# Patient Record
Sex: FEMALE | Race: WHITE | NOT HISPANIC OR LATINO | Employment: OTHER | URBAN - NONMETROPOLITAN AREA
[De-identification: names, ages, dates, MRNs, and addresses within clinical notes are randomized per-mention and may not be internally consistent; named-entity substitution may affect disease eponyms.]

---

## 2018-01-04 ENCOUNTER — OFFICE VISIT CONVERTED (OUTPATIENT)
Dept: FAMILY MEDICINE CLINIC | Age: 63
End: 2018-01-04
Attending: NURSE PRACTITIONER

## 2018-01-19 ENCOUNTER — OFFICE VISIT CONVERTED (OUTPATIENT)
Dept: FAMILY MEDICINE CLINIC | Age: 63
End: 2018-01-19
Attending: NURSE PRACTITIONER

## 2018-02-14 ENCOUNTER — OFFICE VISIT CONVERTED (OUTPATIENT)
Dept: FAMILY MEDICINE CLINIC | Age: 63
End: 2018-02-14
Attending: FAMILY MEDICINE

## 2018-03-23 ENCOUNTER — OFFICE VISIT CONVERTED (OUTPATIENT)
Dept: FAMILY MEDICINE CLINIC | Age: 63
End: 2018-03-23
Attending: NURSE PRACTITIONER

## 2018-06-04 ENCOUNTER — OFFICE VISIT CONVERTED (OUTPATIENT)
Dept: FAMILY MEDICINE CLINIC | Age: 63
End: 2018-06-04
Attending: NURSE PRACTITIONER

## 2018-06-07 ENCOUNTER — OFFICE VISIT (OUTPATIENT)
Dept: ORTHOPEDIC SURGERY | Facility: CLINIC | Age: 63
End: 2018-06-07

## 2018-06-07 VITALS — WEIGHT: 95 LBS | HEIGHT: 62 IN | BODY MASS INDEX: 17.48 KG/M2

## 2018-06-07 DIAGNOSIS — M75.101 ROTATOR CUFF TEAR ARTHROPATHY OF RIGHT SHOULDER: Primary | ICD-10-CM

## 2018-06-07 DIAGNOSIS — Z96.652 STATUS POST TOTAL KNEE REPLACEMENT, LEFT: ICD-10-CM

## 2018-06-07 DIAGNOSIS — M12.811 ROTATOR CUFF TEAR ARTHROPATHY OF RIGHT SHOULDER: Primary | ICD-10-CM

## 2018-06-07 PROCEDURE — 99204 OFFICE O/P NEW MOD 45 MIN: CPT | Performed by: ORTHOPAEDIC SURGERY

## 2018-06-07 RX ORDER — HYDROCODONE BITARTRATE AND ACETAMINOPHEN 7.5; 325 MG/1; MG/1
1 TABLET ORAL EVERY 6 HOURS PRN
COMMUNITY
End: 2018-06-07

## 2018-06-07 RX ORDER — HYDROCODONE BITARTRATE AND ACETAMINOPHEN 5; 325 MG/1; MG/1
1 TABLET ORAL NIGHTLY PRN
Qty: 30 TABLET | Refills: 0 | Status: SHIPPED | OUTPATIENT
Start: 2018-06-07 | End: 2018-06-29

## 2018-06-07 RX ORDER — LEVOTHYROXINE SODIUM 0.07 MG/1
75 TABLET ORAL DAILY
COMMUNITY
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
## 2018-06-14 ENCOUNTER — TELEPHONE (OUTPATIENT)
Dept: ORTHOPEDIC SURGERY | Facility: CLINIC | Age: 63
End: 2018-06-14

## 2018-06-14 NOTE — TELEPHONE ENCOUNTER
DR. GONZÁLES WILL BE PUTTING IN ORDERS FOR MS. DURAN TO HAVE SURGERY. SHE CALLED IN STATING THAT SHE WILL BE OUT OF TOWN FROM 6/13/18-6/25/18 BUT SHE WANTS TO PROCEED WITH THE SURGERY ASAP AFTER 6/25/18 AT [facility]. THANKS!!!

## 2018-06-17 PROBLEM — M75.101 ROTATOR CUFF TEAR ARTHROPATHY OF RIGHT SHOULDER: Status: ACTIVE | Noted: 2018-06-17

## 2018-06-17 PROBLEM — M12.811 ROTATOR CUFF TEAR ARTHROPATHY OF RIGHT SHOULDER: Status: ACTIVE | Noted: 2018-06-17

## 2018-06-17 PROBLEM — Z96.652 STATUS POST TOTAL KNEE REPLACEMENT, LEFT: Status: ACTIVE | Noted: 2018-06-17

## 2018-06-17 RX ORDER — MELOXICAM 7.5 MG/1
15 TABLET ORAL ONCE
Status: CANCELLED | OUTPATIENT
Start: 2018-07-10 | End: 2018-07-10

## 2018-06-17 RX ORDER — CEFAZOLIN SODIUM 2 G/100ML
2 INJECTION, SOLUTION INTRAVENOUS ONCE
Status: CANCELLED | OUTPATIENT
Start: 2018-07-10 | End: 2018-07-10

## 2018-06-17 RX ORDER — PREGABALIN 150 MG/1
150 CAPSULE ORAL ONCE
Status: CANCELLED | OUTPATIENT
Start: 2018-07-10 | End: 2018-07-10

## 2018-06-17 RX ORDER — ACETAMINOPHEN 325 MG/1
1000 TABLET ORAL ONCE
Status: CANCELLED | OUTPATIENT
Start: 2018-07-10 | End: 2018-07-10

## 2018-06-28 NOTE — TELEPHONE ENCOUNTER
PATIENT LEFT VOICEMAIL ON 6/27/18 @ 4:33 PM REQUESTING A REFILL ON HER PAIN MEDICATION (HYDROCODONE 5/325).  PATIENT IS SCHEDULED FOR RIGHT REVERSE TOTAL SHOULDER REPLACEMENT ON 7/10/18./ENMA

## 2018-06-29 ENCOUNTER — APPOINTMENT (OUTPATIENT)
Dept: PREADMISSION TESTING | Facility: HOSPITAL | Age: 63
End: 2018-06-29

## 2018-06-29 VITALS
HEIGHT: 62 IN | HEART RATE: 84 BPM | TEMPERATURE: 97.9 F | BODY MASS INDEX: 17.3 KG/M2 | SYSTOLIC BLOOD PRESSURE: 144 MMHG | RESPIRATION RATE: 16 BRPM | DIASTOLIC BLOOD PRESSURE: 72 MMHG | WEIGHT: 94 LBS | OXYGEN SATURATION: 100 %

## 2018-06-29 DIAGNOSIS — M12.811 ROTATOR CUFF TEAR ARTHROPATHY OF RIGHT SHOULDER: ICD-10-CM

## 2018-06-29 DIAGNOSIS — M75.101 ROTATOR CUFF TEAR ARTHROPATHY OF RIGHT SHOULDER: ICD-10-CM

## 2018-06-29 LAB
ANION GAP SERPL CALCULATED.3IONS-SCNC: 10.8 MMOL/L
BILIRUB UR QL STRIP: NEGATIVE
BUN BLD-MCNC: 20 MG/DL (ref 8–23)
BUN/CREAT SERPL: 32.3 (ref 7–25)
CALCIUM SPEC-SCNC: 9.3 MG/DL (ref 8.6–10.5)
CHLORIDE SERPL-SCNC: 102 MMOL/L (ref 98–107)
CLARITY UR: CLEAR
CO2 SERPL-SCNC: 28.2 MMOL/L (ref 22–29)
COLOR UR: ABNORMAL
CREAT BLD-MCNC: 0.62 MG/DL (ref 0.57–1)
DEPRECATED RDW RBC AUTO: 49.4 FL (ref 37–54)
ERYTHROCYTE [DISTWIDTH] IN BLOOD BY AUTOMATED COUNT: 14 % (ref 11.7–13)
GFR SERPL CREATININE-BSD FRML MDRD: 97 ML/MIN/1.73
GLUCOSE BLD-MCNC: 83 MG/DL (ref 65–99)
GLUCOSE UR STRIP-MCNC: NEGATIVE MG/DL
HCT VFR BLD AUTO: 38.2 % (ref 35.6–45.5)
HGB BLD-MCNC: 12.6 G/DL (ref 11.9–15.5)
HGB UR QL STRIP.AUTO: NEGATIVE
KETONES UR QL STRIP: ABNORMAL
LEUKOCYTE ESTERASE UR QL STRIP.AUTO: NEGATIVE
MCH RBC QN AUTO: 31.7 PG (ref 26.9–32)
MCHC RBC AUTO-ENTMCNC: 33 G/DL (ref 32.4–36.3)
MCV RBC AUTO: 96 FL (ref 80.5–98.2)
NITRITE UR QL STRIP: NEGATIVE
PH UR STRIP.AUTO: 6 [PH] (ref 5–8)
PLATELET # BLD AUTO: 210 10*3/MM3 (ref 140–500)
PMV BLD AUTO: 10.3 FL (ref 6–12)
POTASSIUM BLD-SCNC: 3.7 MMOL/L (ref 3.5–5.2)
PROT UR QL STRIP: NEGATIVE
RBC # BLD AUTO: 3.98 10*6/MM3 (ref 3.9–5.2)
SODIUM BLD-SCNC: 141 MMOL/L (ref 136–145)
SP GR UR STRIP: 1.02 (ref 1–1.03)
UROBILINOGEN UR QL STRIP: ABNORMAL
WBC NRBC COR # BLD: 6.59 10*3/MM3 (ref 4.5–10.7)

## 2018-06-29 PROCEDURE — 93010 ELECTROCARDIOGRAM REPORT: CPT | Performed by: INTERNAL MEDICINE

## 2018-06-29 PROCEDURE — 85027 COMPLETE CBC AUTOMATED: CPT | Performed by: ORTHOPAEDIC SURGERY

## 2018-06-29 PROCEDURE — 36415 COLL VENOUS BLD VENIPUNCTURE: CPT

## 2018-06-29 PROCEDURE — 80048 BASIC METABOLIC PNL TOTAL CA: CPT | Performed by: ORTHOPAEDIC SURGERY

## 2018-06-29 PROCEDURE — 81003 URINALYSIS AUTO W/O SCOPE: CPT | Performed by: ORTHOPAEDIC SURGERY

## 2018-06-29 PROCEDURE — 93005 ELECTROCARDIOGRAM TRACING: CPT

## 2018-06-29 RX ORDER — DIAZEPAM 5 MG/1
5 TABLET ORAL NIGHTLY PRN
Status: ON HOLD | COMMUNITY
End: 2020-06-23 | Stop reason: SDUPTHER

## 2018-06-29 RX ORDER — CHLORHEXIDINE GLUCONATE 500 MG/1
CLOTH TOPICAL
Status: ON HOLD | COMMUNITY
End: 2018-07-10

## 2018-06-29 RX ORDER — HYDROCODONE BITARTRATE AND ACETAMINOPHEN 5; 325 MG/1; MG/1
1 TABLET ORAL EVERY 12 HOURS PRN
Qty: 30 TABLET | Refills: 0 | Status: SHIPPED | OUTPATIENT
Start: 2018-06-29 | End: 2018-07-11 | Stop reason: HOSPADM

## 2018-06-29 NOTE — TELEPHONE ENCOUNTER
Please get a prescription ready for this patient.  Norco 5/325 mg tab 1 by mouth every 12 when necessary pain.  Total 30 pills no refills.  I can sign it for her Friday morning and she can come pick it up.  Thank you

## 2018-07-02 ENCOUNTER — PREP FOR SURGERY (OUTPATIENT)
Dept: OTHER | Facility: HOSPITAL | Age: 63
End: 2018-07-02

## 2018-07-02 DIAGNOSIS — M12.811 ROTATOR CUFF ARTHROPATHY, RIGHT: Primary | ICD-10-CM

## 2018-07-10 ENCOUNTER — ANESTHESIA (OUTPATIENT)
Dept: PERIOP | Facility: HOSPITAL | Age: 63
End: 2018-07-10

## 2018-07-10 ENCOUNTER — ANESTHESIA EVENT (OUTPATIENT)
Dept: PERIOP | Facility: HOSPITAL | Age: 63
End: 2018-07-10

## 2018-07-10 ENCOUNTER — HOSPITAL ENCOUNTER (INPATIENT)
Facility: HOSPITAL | Age: 63
LOS: 2 days | Discharge: HOME | End: 2018-07-12
Attending: ORTHOPAEDIC SURGERY | Admitting: ORTHOPAEDIC SURGERY

## 2018-07-10 ENCOUNTER — APPOINTMENT (OUTPATIENT)
Dept: GENERAL RADIOLOGY | Facility: HOSPITAL | Age: 63
End: 2018-07-10

## 2018-07-10 DIAGNOSIS — M12.811 ROTATOR CUFF ARTHROPATHY, RIGHT: ICD-10-CM

## 2018-07-10 DIAGNOSIS — M75.101 ROTATOR CUFF TEAR ARTHROPATHY OF RIGHT SHOULDER: ICD-10-CM

## 2018-07-10 DIAGNOSIS — M12.811 ROTATOR CUFF TEAR ARTHROPATHY OF RIGHT SHOULDER: ICD-10-CM

## 2018-07-10 DIAGNOSIS — D62 ACUTE BLOOD LOSS AS CAUSE OF POSTOPERATIVE ANEMIA: Primary | ICD-10-CM

## 2018-07-10 PROBLEM — R11.2 PONV (POSTOPERATIVE NAUSEA AND VOMITING): Status: ACTIVE | Noted: 2018-07-10

## 2018-07-10 PROBLEM — E03.9 HYPOTHYROIDISM: Chronic | Status: ACTIVE | Noted: 2018-07-10

## 2018-07-10 PROBLEM — F41.9 ANXIETY: Chronic | Status: ACTIVE | Noted: 2018-07-10

## 2018-07-10 PROBLEM — R11.2 NAUSEA & VOMITING: Status: ACTIVE | Noted: 2018-07-10

## 2018-07-10 PROBLEM — Z98.890 PONV (POSTOPERATIVE NAUSEA AND VOMITING): Status: ACTIVE | Noted: 2018-07-10

## 2018-07-10 LAB
HCT VFR BLD AUTO: 34.5 % (ref 35.6–45.5)
HGB BLD-MCNC: 11.4 G/DL (ref 11.9–15.5)

## 2018-07-10 PROCEDURE — 25010000002 ONDANSETRON PER 1 MG: Performed by: NURSE ANESTHETIST, CERTIFIED REGISTERED

## 2018-07-10 PROCEDURE — 25010000002 PHENYLEPHRINE PER 1 ML: Performed by: NURSE ANESTHETIST, CERTIFIED REGISTERED

## 2018-07-10 PROCEDURE — 25010000002 ROPIVACAINE PER 1 MG: Performed by: ANESTHESIOLOGY

## 2018-07-10 PROCEDURE — 25010000003 CEFAZOLIN IN DEXTROSE 2-4 GM/100ML-% SOLUTION: Performed by: ORTHOPAEDIC SURGERY

## 2018-07-10 PROCEDURE — L3670 SO ACRO/CLAV CAN WEB PRE OTS: HCPCS | Performed by: ORTHOPAEDIC SURGERY

## 2018-07-10 PROCEDURE — 85018 HEMOGLOBIN: CPT | Performed by: ORTHOPAEDIC SURGERY

## 2018-07-10 PROCEDURE — 0RRJ00Z REPLACEMENT OF RIGHT SHOULDER JOINT WITH REVERSE BALL AND SOCKET SYNTHETIC SUBSTITUTE, OPEN APPROACH: ICD-10-PCS | Performed by: ORTHOPAEDIC SURGERY

## 2018-07-10 PROCEDURE — 85014 HEMATOCRIT: CPT | Performed by: ORTHOPAEDIC SURGERY

## 2018-07-10 PROCEDURE — C1713 ANCHOR/SCREW BN/BN,TIS/BN: HCPCS | Performed by: ORTHOPAEDIC SURGERY

## 2018-07-10 PROCEDURE — 25010000002 PROPOFOL 10 MG/ML EMULSION: Performed by: NURSE ANESTHETIST, CERTIFIED REGISTERED

## 2018-07-10 PROCEDURE — C1776 JOINT DEVICE (IMPLANTABLE): HCPCS | Performed by: ORTHOPAEDIC SURGERY

## 2018-07-10 PROCEDURE — 25010000002 MIDAZOLAM PER 1 MG: Performed by: ANESTHESIOLOGY

## 2018-07-10 PROCEDURE — S0260 H&P FOR SURGERY: HCPCS | Performed by: ORTHOPAEDIC SURGERY

## 2018-07-10 PROCEDURE — 23472 RECONSTRUCT SHOULDER JOINT: CPT | Performed by: ORTHOPAEDIC SURGERY

## 2018-07-10 PROCEDURE — 25010000002 FENTANYL CITRATE (PF) 100 MCG/2ML SOLUTION: Performed by: ANESTHESIOLOGY

## 2018-07-10 PROCEDURE — 25010000002 ROPIVACAINE PER 1 MG: Performed by: ORTHOPAEDIC SURGERY

## 2018-07-10 PROCEDURE — 73020 X-RAY EXAM OF SHOULDER: CPT

## 2018-07-10 DEVICE — IMPLANTABLE DEVICE: Type: IMPLANTABLE DEVICE | Site: SHOULDER | Status: FUNCTIONAL

## 2018-07-10 DEVICE — IMPLANTABLE DEVICE
Type: IMPLANTABLE DEVICE | Site: SHOULDER | Status: FUNCTIONAL
Brand: COMPREHENSIVE® REVERSE SHOULDER

## 2018-07-10 DEVICE — IMPLANTABLE DEVICE
Type: IMPLANTABLE DEVICE | Site: SHOULDER | Status: FUNCTIONAL
Brand: COMPREHENSIVE REVERSE SHOULDER

## 2018-07-10 DEVICE — LINER HUM REV TRABECULAR REV PA 7D 36 PL: Type: IMPLANTABLE DEVICE | Site: SHOULDER | Status: FUNCTIONAL

## 2018-07-10 DEVICE — KT SHLDR DRL PIN SPG: Type: IMPLANTABLE DEVICE | Site: SHOULDER | Status: FUNCTIONAL

## 2018-07-10 DEVICE — STEM HUM/SHLDR TRABECULARMETAL REV 14X130MM: Type: IMPLANTABLE DEVICE | Site: SHOULDER | Status: FUNCTIONAL

## 2018-07-10 RX ORDER — ROPIVACAINE HYDROCHLORIDE 5 MG/ML
INJECTION, SOLUTION EPIDURAL; INFILTRATION; PERINEURAL AS NEEDED
Status: DISCONTINUED | OUTPATIENT
Start: 2018-07-10 | End: 2018-07-10 | Stop reason: HOSPADM

## 2018-07-10 RX ORDER — HYDROCODONE BITARTRATE AND ACETAMINOPHEN 7.5; 325 MG/1; MG/1
1 TABLET ORAL EVERY 4 HOURS PRN
Status: DISCONTINUED | OUTPATIENT
Start: 2018-07-10 | End: 2018-07-12 | Stop reason: HOSPADM

## 2018-07-10 RX ORDER — ONDANSETRON 4 MG/1
4 TABLET, ORALLY DISINTEGRATING ORAL EVERY 6 HOURS PRN
Status: DISCONTINUED | OUTPATIENT
Start: 2018-07-10 | End: 2018-07-12 | Stop reason: HOSPADM

## 2018-07-10 RX ORDER — HYDROMORPHONE HYDROCHLORIDE 1 MG/ML
0.5 INJECTION, SOLUTION INTRAMUSCULAR; INTRAVENOUS; SUBCUTANEOUS
Status: DISCONTINUED | OUTPATIENT
Start: 2018-07-10 | End: 2018-07-12 | Stop reason: HOSPADM

## 2018-07-10 RX ORDER — NALOXONE HCL 0.4 MG/ML
0.4 VIAL (ML) INJECTION
Status: DISCONTINUED | OUTPATIENT
Start: 2018-07-10 | End: 2018-07-12 | Stop reason: HOSPADM

## 2018-07-10 RX ORDER — MELOXICAM 7.5 MG/1
15 TABLET ORAL ONCE
Status: COMPLETED | OUTPATIENT
Start: 2018-07-10 | End: 2018-07-10

## 2018-07-10 RX ORDER — LIDOCAINE HYDROCHLORIDE 10 MG/ML
0.5 INJECTION, SOLUTION EPIDURAL; INFILTRATION; INTRACAUDAL; PERINEURAL ONCE AS NEEDED
Status: DISCONTINUED | OUTPATIENT
Start: 2018-07-10 | End: 2018-07-10 | Stop reason: HOSPADM

## 2018-07-10 RX ORDER — DOCUSATE SODIUM 100 MG/1
100 CAPSULE, LIQUID FILLED ORAL 2 TIMES DAILY PRN
Status: DISCONTINUED | OUTPATIENT
Start: 2018-07-10 | End: 2018-07-12 | Stop reason: HOSPADM

## 2018-07-10 RX ORDER — PROMETHAZINE HYDROCHLORIDE 25 MG/1
12.5 TABLET ORAL ONCE AS NEEDED
Status: DISCONTINUED | OUTPATIENT
Start: 2018-07-10 | End: 2018-07-10 | Stop reason: HOSPADM

## 2018-07-10 RX ORDER — CEFAZOLIN SODIUM 2 G/100ML
2 INJECTION, SOLUTION INTRAVENOUS ONCE
Status: COMPLETED | OUTPATIENT
Start: 2018-07-10 | End: 2018-07-10

## 2018-07-10 RX ORDER — ROCURONIUM BROMIDE 10 MG/ML
INJECTION, SOLUTION INTRAVENOUS AS NEEDED
Status: DISCONTINUED | OUTPATIENT
Start: 2018-07-10 | End: 2018-07-10 | Stop reason: SURG

## 2018-07-10 RX ORDER — DIPHENHYDRAMINE HYDROCHLORIDE 50 MG/ML
12.5 INJECTION INTRAMUSCULAR; INTRAVENOUS
Status: DISCONTINUED | OUTPATIENT
Start: 2018-07-10 | End: 2018-07-10 | Stop reason: HOSPADM

## 2018-07-10 RX ORDER — ACETAMINOPHEN 325 MG/1
1000 TABLET ORAL ONCE
Status: COMPLETED | OUTPATIENT
Start: 2018-07-10 | End: 2018-07-10

## 2018-07-10 RX ORDER — LIDOCAINE HYDROCHLORIDE 20 MG/ML
INJECTION, SOLUTION INFILTRATION; PERINEURAL AS NEEDED
Status: DISCONTINUED | OUTPATIENT
Start: 2018-07-10 | End: 2018-07-10 | Stop reason: SURG

## 2018-07-10 RX ORDER — PROPOFOL 10 MG/ML
VIAL (ML) INTRAVENOUS AS NEEDED
Status: DISCONTINUED | OUTPATIENT
Start: 2018-07-10 | End: 2018-07-10 | Stop reason: SURG

## 2018-07-10 RX ORDER — ACETAMINOPHEN 325 MG/1
325 TABLET ORAL EVERY 4 HOURS PRN
Status: DISCONTINUED | OUTPATIENT
Start: 2018-07-10 | End: 2018-07-12 | Stop reason: HOSPADM

## 2018-07-10 RX ORDER — PROMETHAZINE HYDROCHLORIDE 25 MG/ML
12.5 INJECTION, SOLUTION INTRAMUSCULAR; INTRAVENOUS EVERY 6 HOURS PRN
Status: DISCONTINUED | OUTPATIENT
Start: 2018-07-10 | End: 2018-07-12 | Stop reason: HOSPADM

## 2018-07-10 RX ORDER — HYDROCODONE BITARTRATE AND ACETAMINOPHEN 7.5; 325 MG/1; MG/1
1 TABLET ORAL ONCE AS NEEDED
Status: DISCONTINUED | OUTPATIENT
Start: 2018-07-10 | End: 2018-07-10 | Stop reason: HOSPADM

## 2018-07-10 RX ORDER — BISACODYL 5 MG/1
10 TABLET, DELAYED RELEASE ORAL DAILY PRN
Status: DISCONTINUED | OUTPATIENT
Start: 2018-07-10 | End: 2018-07-12 | Stop reason: HOSPADM

## 2018-07-10 RX ORDER — MORPHINE SULFATE 2 MG/ML
4 INJECTION, SOLUTION INTRAMUSCULAR; INTRAVENOUS
Status: DISCONTINUED | OUTPATIENT
Start: 2018-07-10 | End: 2018-07-10

## 2018-07-10 RX ORDER — EPHEDRINE SULFATE 50 MG/ML
INJECTION, SOLUTION INTRAVENOUS AS NEEDED
Status: DISCONTINUED | OUTPATIENT
Start: 2018-07-10 | End: 2018-07-10 | Stop reason: SURG

## 2018-07-10 RX ORDER — LABETALOL HYDROCHLORIDE 5 MG/ML
5 INJECTION, SOLUTION INTRAVENOUS
Status: DISCONTINUED | OUTPATIENT
Start: 2018-07-10 | End: 2018-07-10 | Stop reason: HOSPADM

## 2018-07-10 RX ORDER — SCOLOPAMINE TRANSDERMAL SYSTEM 1 MG/1
1 PATCH, EXTENDED RELEASE TRANSDERMAL
Status: DISCONTINUED | OUTPATIENT
Start: 2018-07-10 | End: 2018-07-10 | Stop reason: HOSPADM

## 2018-07-10 RX ORDER — ONDANSETRON 4 MG/1
4 TABLET, FILM COATED ORAL EVERY 6 HOURS PRN
Status: DISCONTINUED | OUTPATIENT
Start: 2018-07-10 | End: 2018-07-12 | Stop reason: HOSPADM

## 2018-07-10 RX ORDER — BISACODYL 10 MG
10 SUPPOSITORY, RECTAL RECTAL DAILY PRN
Status: DISCONTINUED | OUTPATIENT
Start: 2018-07-10 | End: 2018-07-12 | Stop reason: HOSPADM

## 2018-07-10 RX ORDER — FENTANYL CITRATE 50 UG/ML
50 INJECTION, SOLUTION INTRAMUSCULAR; INTRAVENOUS
Status: DISCONTINUED | OUTPATIENT
Start: 2018-07-10 | End: 2018-07-10 | Stop reason: HOSPADM

## 2018-07-10 RX ORDER — MIDAZOLAM HYDROCHLORIDE 1 MG/ML
2 INJECTION INTRAMUSCULAR; INTRAVENOUS
Status: DISCONTINUED | OUTPATIENT
Start: 2018-07-10 | End: 2018-07-10 | Stop reason: HOSPADM

## 2018-07-10 RX ORDER — ONDANSETRON 2 MG/ML
4 INJECTION INTRAMUSCULAR; INTRAVENOUS ONCE AS NEEDED
Status: DISCONTINUED | OUTPATIENT
Start: 2018-07-10 | End: 2018-07-10 | Stop reason: HOSPADM

## 2018-07-10 RX ORDER — SODIUM CHLORIDE 0.9 % (FLUSH) 0.9 %
1-10 SYRINGE (ML) INJECTION AS NEEDED
Status: DISCONTINUED | OUTPATIENT
Start: 2018-07-10 | End: 2018-07-10 | Stop reason: HOSPADM

## 2018-07-10 RX ORDER — OXYCODONE AND ACETAMINOPHEN 7.5; 325 MG/1; MG/1
1 TABLET ORAL ONCE AS NEEDED
Status: COMPLETED | OUTPATIENT
Start: 2018-07-10 | End: 2018-07-10

## 2018-07-10 RX ORDER — SODIUM CHLORIDE, SODIUM LACTATE, POTASSIUM CHLORIDE, CALCIUM CHLORIDE 600; 310; 30; 20 MG/100ML; MG/100ML; MG/100ML; MG/100ML
9 INJECTION, SOLUTION INTRAVENOUS CONTINUOUS
Status: DISCONTINUED | OUTPATIENT
Start: 2018-07-10 | End: 2018-07-10

## 2018-07-10 RX ORDER — HYDROCODONE BITARTRATE AND ACETAMINOPHEN 7.5; 325 MG/1; MG/1
2 TABLET ORAL EVERY 4 HOURS PRN
Status: DISCONTINUED | OUTPATIENT
Start: 2018-07-10 | End: 2018-07-12 | Stop reason: HOSPADM

## 2018-07-10 RX ORDER — ONDANSETRON 2 MG/ML
INJECTION INTRAMUSCULAR; INTRAVENOUS AS NEEDED
Status: DISCONTINUED | OUTPATIENT
Start: 2018-07-10 | End: 2018-07-10 | Stop reason: SURG

## 2018-07-10 RX ORDER — HYDROMORPHONE HYDROCHLORIDE 1 MG/ML
0.5 INJECTION, SOLUTION INTRAMUSCULAR; INTRAVENOUS; SUBCUTANEOUS
Status: DISCONTINUED | OUTPATIENT
Start: 2018-07-10 | End: 2018-07-10 | Stop reason: HOSPADM

## 2018-07-10 RX ORDER — LEVOTHYROXINE SODIUM 0.07 MG/1
75 TABLET ORAL DAILY
Status: DISCONTINUED | OUTPATIENT
Start: 2018-07-10 | End: 2018-07-12 | Stop reason: HOSPADM

## 2018-07-10 RX ORDER — PROMETHAZINE HYDROCHLORIDE 25 MG/1
25 TABLET ORAL ONCE AS NEEDED
Status: DISCONTINUED | OUTPATIENT
Start: 2018-07-10 | End: 2018-07-10 | Stop reason: HOSPADM

## 2018-07-10 RX ORDER — ROPIVACAINE HYDROCHLORIDE 2 MG/ML
INJECTION, SOLUTION EPIDURAL; INFILTRATION; PERINEURAL AS NEEDED
Status: DISCONTINUED | OUTPATIENT
Start: 2018-07-10 | End: 2018-07-10 | Stop reason: SURG

## 2018-07-10 RX ORDER — SODIUM CHLORIDE, SODIUM LACTATE, POTASSIUM CHLORIDE, CALCIUM CHLORIDE 600; 310; 30; 20 MG/100ML; MG/100ML; MG/100ML; MG/100ML
75 INJECTION, SOLUTION INTRAVENOUS CONTINUOUS
Status: DISCONTINUED | OUTPATIENT
Start: 2018-07-10 | End: 2018-07-12 | Stop reason: HOSPADM

## 2018-07-10 RX ORDER — NALOXONE HCL 0.4 MG/ML
0.2 VIAL (ML) INJECTION AS NEEDED
Status: DISCONTINUED | OUTPATIENT
Start: 2018-07-10 | End: 2018-07-10 | Stop reason: HOSPADM

## 2018-07-10 RX ORDER — PREGABALIN 150 MG/1
150 CAPSULE ORAL ONCE
Status: COMPLETED | OUTPATIENT
Start: 2018-07-10 | End: 2018-07-10

## 2018-07-10 RX ORDER — PROMETHAZINE HYDROCHLORIDE 25 MG/ML
12.5 INJECTION, SOLUTION INTRAMUSCULAR; INTRAVENOUS ONCE AS NEEDED
Status: DISCONTINUED | OUTPATIENT
Start: 2018-07-10 | End: 2018-07-10 | Stop reason: HOSPADM

## 2018-07-10 RX ORDER — PROMETHAZINE HYDROCHLORIDE 25 MG/1
25 SUPPOSITORY RECTAL ONCE AS NEEDED
Status: DISCONTINUED | OUTPATIENT
Start: 2018-07-10 | End: 2018-07-10 | Stop reason: HOSPADM

## 2018-07-10 RX ORDER — EPHEDRINE SULFATE 50 MG/ML
5 INJECTION, SOLUTION INTRAVENOUS ONCE AS NEEDED
Status: DISCONTINUED | OUTPATIENT
Start: 2018-07-10 | End: 2018-07-10 | Stop reason: HOSPADM

## 2018-07-10 RX ORDER — CEFAZOLIN SODIUM 2 G/100ML
2 INJECTION, SOLUTION INTRAVENOUS EVERY 8 HOURS
Status: COMPLETED | OUTPATIENT
Start: 2018-07-10 | End: 2018-07-11

## 2018-07-10 RX ORDER — MIDAZOLAM HYDROCHLORIDE 1 MG/ML
1 INJECTION INTRAMUSCULAR; INTRAVENOUS
Status: DISCONTINUED | OUTPATIENT
Start: 2018-07-10 | End: 2018-07-10 | Stop reason: HOSPADM

## 2018-07-10 RX ORDER — ONDANSETRON 2 MG/ML
4 INJECTION INTRAMUSCULAR; INTRAVENOUS EVERY 6 HOURS PRN
Status: DISCONTINUED | OUTPATIENT
Start: 2018-07-10 | End: 2018-07-12 | Stop reason: HOSPADM

## 2018-07-10 RX ORDER — FLUMAZENIL 0.1 MG/ML
0.2 INJECTION INTRAVENOUS AS NEEDED
Status: DISCONTINUED | OUTPATIENT
Start: 2018-07-10 | End: 2018-07-10 | Stop reason: HOSPADM

## 2018-07-10 RX ORDER — FAMOTIDINE 10 MG/ML
20 INJECTION, SOLUTION INTRAVENOUS ONCE
Status: COMPLETED | OUTPATIENT
Start: 2018-07-10 | End: 2018-07-10

## 2018-07-10 RX ORDER — DIAZEPAM 2 MG/1
2 TABLET ORAL 2 TIMES DAILY PRN
Status: DISCONTINUED | OUTPATIENT
Start: 2018-07-10 | End: 2018-07-12 | Stop reason: HOSPADM

## 2018-07-10 RX ADMIN — SODIUM CHLORIDE, POTASSIUM CHLORIDE, SODIUM LACTATE AND CALCIUM CHLORIDE: 600; 310; 30; 20 INJECTION, SOLUTION INTRAVENOUS at 09:10

## 2018-07-10 RX ADMIN — MELOXICAM 15 MG: 15 TABLET ORAL at 06:10

## 2018-07-10 RX ADMIN — CEFAZOLIN SODIUM 2 G: 2 INJECTION, SOLUTION INTRAVENOUS at 15:37

## 2018-07-10 RX ADMIN — ONDANSETRON 4 MG: 2 INJECTION INTRAMUSCULAR; INTRAVENOUS at 08:10

## 2018-07-10 RX ADMIN — SCOPOLAMINE 1 PATCH: 1 PATCH, EXTENDED RELEASE TRANSDERMAL at 07:00

## 2018-07-10 RX ADMIN — EPHEDRINE SULFATE 10 MG: 50 INJECTION INTRAMUSCULAR; INTRAVENOUS; SUBCUTANEOUS at 08:03

## 2018-07-10 RX ADMIN — ACETAMINOPHEN 975 MG: 325 TABLET, FILM COATED ORAL at 06:10

## 2018-07-10 RX ADMIN — LIDOCAINE HYDROCHLORIDE 40 MG: 20 INJECTION, SOLUTION INFILTRATION; PERINEURAL at 07:59

## 2018-07-10 RX ADMIN — PROPOFOL 150 MG: 10 INJECTION, EMULSION INTRAVENOUS at 07:59

## 2018-07-10 RX ADMIN — FENTANYL CITRATE 50 MCG: 50 INJECTION, SOLUTION INTRAMUSCULAR; INTRAVENOUS at 07:05

## 2018-07-10 RX ADMIN — PHENYLEPHRINE HYDROCHLORIDE 100 MCG: 10 INJECTION INTRAVENOUS at 09:53

## 2018-07-10 RX ADMIN — PREGABALIN 150 MG: 75 CAPSULE ORAL at 06:10

## 2018-07-10 RX ADMIN — SODIUM CHLORIDE, POTASSIUM CHLORIDE, SODIUM LACTATE AND CALCIUM CHLORIDE 75 ML/HR: 600; 310; 30; 20 INJECTION, SOLUTION INTRAVENOUS at 11:39

## 2018-07-10 RX ADMIN — ROCURONIUM BROMIDE 40 MG: 10 INJECTION INTRAVENOUS at 08:00

## 2018-07-10 RX ADMIN — PHENYLEPHRINE HYDROCHLORIDE 100 MCG: 10 INJECTION INTRAVENOUS at 09:23

## 2018-07-10 RX ADMIN — ROPIVACAINE HYDROCHLORIDE 30 ML: 2 INJECTION, SOLUTION EPIDURAL; INFILTRATION at 06:59

## 2018-07-10 RX ADMIN — PHENYLEPHRINE HYDROCHLORIDE 100 MCG: 10 INJECTION INTRAVENOUS at 09:38

## 2018-07-10 RX ADMIN — DIAZEPAM 2 MG: 2 TABLET ORAL at 18:31

## 2018-07-10 RX ADMIN — SODIUM CHLORIDE, POTASSIUM CHLORIDE, SODIUM LACTATE AND CALCIUM CHLORIDE 9 ML/HR: 600; 310; 30; 20 INJECTION, SOLUTION INTRAVENOUS at 06:55

## 2018-07-10 RX ADMIN — HYDROCODONE BITARTRATE AND ACETAMINOPHEN 2 TABLET: 7.5; 325 TABLET ORAL at 20:18

## 2018-07-10 RX ADMIN — HYDROCODONE BITARTRATE AND ACETAMINOPHEN 1 TABLET: 7.5; 325 TABLET ORAL at 15:37

## 2018-07-10 RX ADMIN — CEFAZOLIN SODIUM 2 G: 2 INJECTION, SOLUTION INTRAVENOUS at 07:59

## 2018-07-10 RX ADMIN — PHENYLEPHRINE HYDROCHLORIDE 100 MCG: 10 INJECTION INTRAVENOUS at 09:08

## 2018-07-10 RX ADMIN — EPHEDRINE SULFATE 10 MG: 50 INJECTION INTRAMUSCULAR; INTRAVENOUS; SUBCUTANEOUS at 08:18

## 2018-07-10 RX ADMIN — MIDAZOLAM HYDROCHLORIDE 1 MG: 2 INJECTION, SOLUTION INTRAMUSCULAR; INTRAVENOUS at 07:05

## 2018-07-10 RX ADMIN — MUPIROCIN 1 APPLICATION: 20 OINTMENT TOPICAL at 20:25

## 2018-07-10 RX ADMIN — MIDAZOLAM HYDROCHLORIDE 1 MG: 2 INJECTION, SOLUTION INTRAMUSCULAR; INTRAVENOUS at 07:01

## 2018-07-10 RX ADMIN — FENTANYL CITRATE 50 MCG: 50 INJECTION, SOLUTION INTRAMUSCULAR; INTRAVENOUS at 07:01

## 2018-07-10 RX ADMIN — FAMOTIDINE 20 MG: 10 INJECTION, SOLUTION INTRAVENOUS at 06:55

## 2018-07-10 RX ADMIN — SODIUM CHLORIDE, POTASSIUM CHLORIDE, SODIUM LACTATE AND CALCIUM CHLORIDE 500 ML: 600; 310; 30; 20 INJECTION, SOLUTION INTRAVENOUS at 06:10

## 2018-07-10 RX ADMIN — SUGAMMADEX 100 MG: 100 INJECTION, SOLUTION INTRAVENOUS at 09:52

## 2018-07-10 RX ADMIN — EPHEDRINE SULFATE 10 MG: 50 INJECTION INTRAMUSCULAR; INTRAVENOUS; SUBCUTANEOUS at 08:48

## 2018-07-10 RX ADMIN — CEFAZOLIN SODIUM 2 G: 2 INJECTION, SOLUTION INTRAVENOUS at 23:56

## 2018-07-10 RX ADMIN — OXYCODONE HYDROCHLORIDE AND ACETAMINOPHEN 1 TABLET: 7.5; 325 TABLET ORAL at 11:27

## 2018-07-10 NOTE — ADDENDUM NOTE
Addendum  created 07/10/18 1548 by Mikie Fay MD    Anesthesia Review and Sign - Ready for Procedure

## 2018-07-10 NOTE — ANESTHESIA PREPROCEDURE EVALUATION
Anesthesia Evaluation                  Airway   Dental          Pulmonary    Cardiovascular         Neuro/Psych  (+) psychiatric history Anxiety,     GI/Hepatic/Renal/Endo    (+)   hypothyroidism,     Musculoskeletal     Abdominal    Substance History      OB/GYN          Other   (+) arthritis                     Anesthesia Plan    ASA 2     general

## 2018-07-10 NOTE — ANESTHESIA PROCEDURE NOTES
Airway  Urgency: elective    Airway not difficult    General Information and Staff    Patient location during procedure: OR  Anesthesiologist: CHI PANIAGUA  CRNA: GERARDO COX    Indications and Patient Condition  Indications for airway management: airway protection    Preoxygenated: yes  Mask difficulty assessment: 1 - vent by mask    Final Airway Details  Final airway type: endotracheal airway      Successful airway: ETT  Cuffed: yes   Successful intubation technique: direct laryngoscopy  Facilitating devices/methods: Bougie  Endotracheal tube insertion site: oral  Blade: Blake  Blade size: #3  ETT size: 7.0 mm  Cormack-Lehane Classification: grade IIb - view of arytenoids or posterior of glottis only  Placement verified by: chest auscultation and capnometry   Measured from: lips  ETT to lips (cm): 20  Number of attempts at approach: 1    Additional Comments  Smooth IV/mask induction/intubation. Easy bag-mask ventilation. Orally intubated, easy, atraumatic, lips/teeth/mouth left intact, as preop. +ETCO2, bilateral breath sounds and equal.

## 2018-07-10 NOTE — ANESTHESIA POSTPROCEDURE EVALUATION
Patient: Richelle Morelos    Procedure Summary     Date:  07/10/18 Room / Location:  [facility] OR  / [facility] MAIN OR    Anesthesia Start:  0753 Anesthesia Stop:  1016    Procedure:  TOTAL SHOULDER REVERSE ARTHROPLASTY (Right Shoulder) Diagnosis:       Rotator cuff tear arthropathy of right shoulder      (Rotator cuff tear arthropathy of right shoulder [M12.811])    Surgeon:  Naveen Michaud MD Provider:  Oksana Loja MD    Anesthesia Type:  general ASA Status:  2          Anesthesia Type: general  Last vitals  BP   148/75 (07/10/18 1100)   Temp   36.4 °C (97.6 °F) (07/10/18 1016)   Pulse   96 (07/10/18 1100)   Resp   18 (07/10/18 1100)     SpO2   100 % (07/10/18 1100)     Post Anesthesia Care and Evaluation    Patient location during evaluation: bedside  Patient participation: complete - patient participated  Level of consciousness: awake  Pain management: adequate  Airway patency: patent  Anesthetic complications: No anesthetic complications    Cardiovascular status: acceptable  Respiratory status: acceptable  Hydration status: acceptable

## 2018-07-10 NOTE — OP NOTE
REVERSE TOTAL SHOULDER ARTHROPLASTY       PATIENT NAME: Cristian Morelos    YOB: 1955    ATTENDING PHYSICIAN: Naveen Michaud M.D.    DATE OF PROCEDURE: 10 July 2018    PREOPERATIVE DIAGNOSIS: Rotator cuff tear arthropathy of right shoulder [M12.811] with a large irreparable tear of the rotator cuff muscles.    POSTOPERATIVE DIAGNOSIS: Post-Op Diagnosis Codes:     * Rotator cuff tear arthropathy of right shoulder [M12.811]    PRINCIPAL DIAGNOSIS:  Severe degenerative osteoarthritis, right shoulder.    PROCEDURE: Right reverse total shoulder arthroplasty.    SURGEON:  Naveen Michaud M.D.    ASSISTANT: NEGRO Alavrado.  The presence of my assistant was required for safe positioning of the patient and safe retraction of the tissues while the implants were being placed    ANESTHESIOLOGIST: Oksana Reyes M.D.    ANESTHESIA: Scalene block for postop pain control followed by general anesthesia with an LMA    POSITION: Beach chair.    DRAINS: None    COMPLICATIONS: None    ESTIMATED BLOOD LOSS: 100 mL    SPECIMENS: * No orders in the log *    IMPLANT USED: A #14 Licha TM coated press-fit humeral stem with a 6 mm polyethylene insert, a 25 mm comprehensive mini base plate from [employer] for the glenoid with a 36mm Glenosphere with 0 mm offset and 4 screws.      INDICATION: The patient has severe pain and discomfort in the right shoulder with exquisite discomfort. Patient could not carry out activities of daily living and recreational activities. Risks and benefits of surgical intervention were discussed with the patient only after it was determined that conservative care would not provide them with adequate relief of symptoms.  All potential complications were discussed with the patient in great detail.     DETAILS OF PROCEDURE: Surgical timeout was called. Operative extremity was correctly identified in the operating room suite. Patient was placed under appropriate anesthetic.  The patient was placed in a beach  chair position.  All bony prominences were carefully padded and protected.  Patient was given antibiotics per the SCIP protocol. Extremity was prepped and draped in a standard fashion. A deltopectoral incision was carried out. The interval between the deltoid and the pectoralis major was developed. The cephalic vein was carefully protected and retracted laterally. The subscap was identified, traction sutures were placed for subsequent reattachment using FiberWire suture. The anterior capsulotomy was carried out. The anterior capsule was tagged for subsequent reattachment as well. The humeral head was subluxated anteriorly.The biceps tendon was tagged for subsequent reattachment. The anterior and inferior capsular release was carried out along the humeral neck. intramedullary canal was identified and we started with a #8 reamer. We progressed on with 1 mm increments up to the point that there was a good intramedullary fit with the appropriate 14 mm reamer. The appropriate # 14 humeral trial component was impacted into position and an excellent fit was obtained. Good metaphyseal fit was obtained along with diaphyseal fixation. Appropriate amounts of retroversion were built into the positioning of the component.     Retractors were placed around the glenoid posteriorly, anteriorly, and inferiorly. The glenoid labrum was resected. The stump of the long head of the biceps was resected superiorly. Articular remnants of the of articular cartilage was scarped off the glenoid. The central post pin was placed after careful gauging of the anterior, posterior, and inferior aspect of the glenoid. There was a slight interior tilt based into the placement of the pin. The central post was drilled followed by the placement of the pin. The central post was drilled followed by the placement of a peripheral soft tissue reamer. Bleeding subchondral bone was exposed in the base of the glenoid. A central post reamer was then used and the  cancellous bone was lavaged with bacitracin irrigating solution. Diluted betadine solution was used as an antibiotic solution and a press-fit 25 mm, mini Comprehensive, glenoid component was impacted into position. The stability of the glenoid component was augmented with screws, one superoinferiorly, the other one posteroinferiorly. The 36 mm diameter, 0 mm offset glenosphere was then impacted and locked onto the davis taper of the glenoid component.     Trial reduction was carried out. Stability was excellent. The limb lengths were checked and found to be accurate. There was no tendency towards dislocation of the components in any position of the prosthesis. The humerus was subluxated anteriorly. The humeral canal was lavaged with bacitracin irrigating solution and diluted betadine was used as an antibiotic solution. The appropriate humeral stem, #14 TM coated, press-fit stem was then impacted and locked into position. Excellent rotational and axial stability were obtained. The trial reductions was carried out and then the appropriate +6 mm polyethylene insert was snapped and locked onto the humeral side. A final reduction was carried out. Stability was checked and found to be excellent. There was no tendency towards dislocation, and limb lengths were accurate. The anterior capsule was then repaired with interrupted sutures. The remnants of the rotator were reattached to build a soft tissue envelope to provide stability anteriorly and superiorly for the components. deltopectorial interval was carefully approximated. Subcuticular sutures applied. A beast cocktail was instilled into the subperiosteal tissues and intra-articularly into the capsule for postoperative analgesia. Occlusive dressing was applied. Slingshot was applied to control the position of the extremity. Patient tolerated the procedure well. Sponge, gauze and needle count was correct. The patient was reversed from anesthesia and taken from the  operating room to the recovery room in stable condition. I discussed the satisfactory performance of this procedure with the patient’s family and answered all questions for them.      Naveen Michaud MD  7/10/2018  10:02 AM

## 2018-07-10 NOTE — H&P
History & Physical       Patient: Richelle Morelos    Date of Admission: 7/10/2018  5:11 AM    YOB: 1955    Medical Record Number: [id number]    Attending Physician: Naveen Michaud MD        Chief Complaints: Rotator cuff tear arthropathy of right shoulder [M12.811]  Rotator cuff tear arthropathy of right shoulder [M12.811]      History of Present Illness: 63 y.o. female presents with Rotator cuff tear arthropathy of right shoulder [M12.811]  Rotator cuff tear arthropathy of right shoulder [M12.811]. Onset of symptoms was gradual in onset over the past 2-3 years.  Symptoms are associated with pain with limited ability to abduct the arm.  Symptoms are aggravated by repetitive overhead abduction and lifting heavy weights with the upper extremity.   Symptoms improve with resting and keeping the arm by the side of. Patient is now being admitted to the services of Naveen Michaud MD for further evaluation and treatment.        Allergies   Allergen Reactions   • Morphine And Related Nausea Only and Palpitations         Home Medications:  Prescriptions Prior to Admission   Medication Sig Dispense Refill Last Dose   • Chlorhexidine Gluconate Cloth 2 % pads Apply  topically. USE PREOP AS DIRECTED PM PRIOR AND AM PRIOR TO OR   7/10/2018 at Unknown time   • diazePAM (VALIUM) 5 MG tablet Take 2.5-5 mg by mouth 2 (Two) Times a Day As Needed for Anxiety.   7/9/2018 at 2100   • HYDROcodone-acetaminophen (NORCO) 5-325 MG per tablet Take 1 tablet by mouth Every 12 (Twelve) Hours As Needed for Mild Pain  or Moderate Pain . 30 tablet 0 7/9/2018 at 2200   • levothyroxine (SYNTHROID, LEVOTHROID) 75 MCG tablet Take 75 mcg by mouth Daily.   7/9/2018 at 0400   • mupirocin (BACTROBAN) 2 % ointment Apply 1 application topically 2 (Two) Times a Day. TO USE AS DIRECTED PREOP  BID DAY PRIOR TO OR AND AM PRIOR TO SURGERY   7/10/2018 at 0400       Current Medications:  Scheduled Meds:  ceFAZolin 2 g Intravenous Once   lactated ringers  500 mL Intravenous Once     Continuous Infusions:   PRN Meds:.       Past Medical History:   Diagnosis Date   • Anxiety    • Arthritis     OSTEO   • History of headache    • Hypothyroidism    • PONV (postoperative nausea and vomiting)    • Right shoulder pain    • Slow to wake up after anesthesia    • Vision loss     RIGHT EYE        Past Surgical History:   Procedure Laterality Date   • EYE SURGERY Right     SEVERAL TIME FOR HEMORRHAGE   • HYSTERECTOMY      ABD WITH CAILIN S AND O   • KNEE ARTHROPLASTY Left    • LAPAROSCOPIC CHOLECYSTECTOMY     • REFRACTIVE SURGERY Bilateral    • TEETH EXTRACTION          Social History     Occupational History   • Not on file.     Social History Main Topics   • Smoking status: Former Smoker     Packs/day: 0.50     Years: 0.50     Types: Cigarettes   • Smokeless tobacco: Never Used      Comment: 1/2 PPD QUIT 47 YR AGO.    • Alcohol use No   • Drug use: No   • Sexual activity: Not on file    Social History     Social History Narrative   • No narrative on file        Family History   Problem Relation Age of Onset   • Malig Hyperthermia Neg Hx          Review of Systems:   HEENT: Patient denies any headaches, vision changes, change in hearing, or tinnitus, Patient denies any rhinorrhea,epistaxis, sinus pain, mouth or dental problems, sore throat or hoarseness, or dysphagia  Pulmonary: Patient denies any cough, congestion, SOA, or wheezing  Cardiovascular: Patient denies any chest pain, dyspnea, palpitations, weakness, intolerance of exercise, varicosities, swelling of extremities, known murmur  Gastrointestinal:  Patient denies nausea, vomiting, diarrhea, constipation, loss  of appetite, change in appetite, dysphagia, gas, heartburn, melena, change in bowel habits, use of laxatives or other drugs to alter the function of the gastrointestinal tract.  Genital/Urinary: Patient denies dysuria, change in color of urine, change in frequency of urination, pain with urgency, incontinence,  retention, or nocturia.  Musculoskeletal: Patient denies increased warmth; redness; or swelling of joints; limitation of function; deformity; crepitation: pain in a joint or an extremity, the neck, or the back, especially with movement.  Neurological: Patient denies dizziness, tremor, ataxia, difficulty in speaking, change in speech, paresthesia, loss of sensation, seizures, syncope, changes in memory.  Endocrine system: Patient denies tremors, palpitations, intolerance of heat or cold, polyuria, polydipsia, polyphagia, diaphoresis, exophthalmos, or goiter.  Psychological: Patient denies thoughts/plans or harming self or other; depression,  insomnia, night terrors, goldie, memory loss, disorientation.  Skin: Patient denies any bruising, rashes, discoloration, pruritus, wounds, ulcers, decubiti, changes in the hair or nails  Hematopoietic: Patient denies history of spontaneous or excessive bleeding, epistaxis, hematuria, melena, fatigue, enlarged or tender lymph nodes, pallor, history of anemia.    Physical Exam: 63 y.o. female  Vitals:    07/10/18 0603   BP: 144/72   BP Location: Right arm   Patient Position: Lying   Pulse: 78   Resp: 20   Temp: 97.6 °F (36.4 °C)   TempSrc: Oral   SpO2: 99%       General Appearance:          Alert, cooperative, in no acute distress                                                 Head:    Normocephalic, without obvious abnormality, atraumatic   Eyes:            Lids and lashes normal, conjunctivae and sclerae normal, no   icterus, no pallor, corneas clear, PERRLA   Ears:    Ears appear intact with no abnormalities noted   Throat:   No oral lesions, no thrush, oral mucosa moist   Neck:   No adenopathy, supple, trachea midline, no thyromegaly, no   carotid bruit, no JVD   Back:     No kyphosis present, no scoliosis present, no skin lesions,      erythema or scars, no tenderness to percussion or                   palpation,   range of motion normal   Lungs:     Clear to  auscultation,respirations regular, even and                  unlabored    Heart:    Regular rhythm and normal rate, normal S1 and S2, no            murmur, no gallop, no rub, no click   Chest Wall:    No abnormalities observed   Abdomen:     Normal bowel sounds, no masses, no organomegaly, soft        non-tender, non-distended, no guarding, no rebound                tenderness   Rectal:     Deferred   Extremities:   Tenderness over The anterior aspect of the right shoulder joint  . Moves all extremities well, no edema,   no cyanosis, no redness   Pulses:   Pulses palpable and equal bilaterally   Skin:   No bleeding, bruising or rash   Lymph nodes:   No palpable adenopathy   Neurologic:   Cranial nerves 2 - 12 grossly intact, sensation intact, DTR       present and equal bilaterally   right shoulder. Rotator cuff function is extremely impaired. There is a high riding humeral head with articulation of the humerus to the undersurface of the acromiohumeral articulation. AC joint is exquisitely tender and painful for patient. Axillary nerve function is well preserved. There is significant winging of the scapula with hollowing of the fossae over the proximal and distal aspects of the spine of the scapula. Forward flexion is 0-90 degrees, active abduction is 0-90 degrees. Drop arm sign is positive. External rotation against resistance is extremely painful and limited for the patient. Skin and soft tissues are essentially normal other than some amounts of swelling. The pain level is 6          Diagnostic Tests:  No visits with results within 2 Day(s) from this visit.   Latest known visit with results is:   Appointment on 06/29/2018   Component Date Value Ref Range Status   • Glucose 06/29/2018 83  65 - 99 mg/dL Final   • BUN 06/29/2018 20  8 - 23 mg/dL Final   • Creatinine 06/29/2018 0.62  0.57 - 1.00 mg/dL Final   • Sodium 06/29/2018 141  136 - 145 mmol/L Final   • Potassium 06/29/2018 3.7  3.5 - 5.2 mmol/L Final   •  Chloride 06/29/2018 102  98 - 107 mmol/L Final   • CO2 06/29/2018 28.2  22.0 - 29.0 mmol/L Final   • Calcium 06/29/2018 9.3  8.6 - 10.5 mg/dL Final   • eGFR Non African Amer 06/29/2018 97  >60 mL/min/1.73 Final   • BUN/Creatinine Ratio 06/29/2018 32.3* 7.0 - 25.0 Final   • Anion Gap 06/29/2018 10.8  mmol/L Final   • WBC 06/29/2018 6.59  4.50 - 10.70 10*3/mm3 Final   • RBC 06/29/2018 3.98  3.90 - 5.20 10*6/mm3 Final   • Hemoglobin 06/29/2018 12.6  11.9 - 15.5 g/dL Final   • Hematocrit 06/29/2018 38.2  35.6 - 45.5 % Final   • MCV 06/29/2018 96.0  80.5 - 98.2 fL Final   • MCH 06/29/2018 31.7  26.9 - 32.0 pg Final   • MCHC 06/29/2018 33.0  32.4 - 36.3 g/dL Final   • RDW 06/29/2018 14.0* 11.7 - 13.0 % Final   • RDW-SD 06/29/2018 49.4  37.0 - 54.0 fl Final   • MPV 06/29/2018 10.3  6.0 - 12.0 fL Final   • Platelets 06/29/2018 210  140 - 500 10*3/mm3 Final   • Color, UA 06/29/2018 Dark Yellow* Yellow, Straw Final   • Appearance, UA 06/29/2018 Clear  Clear Final   • pH, UA 06/29/2018 6.0  5.0 - 8.0 Final   • Specific Gravity, UA 06/29/2018 1.025  1.005 - 1.030 Final   • Glucose, UA 06/29/2018 Negative  Negative Final   • Ketones, UA 06/29/2018 Trace* Negative Final   • Bilirubin, UA 06/29/2018 Negative  Negative Final   • Blood, UA 06/29/2018 Negative  Negative Final   • Protein, UA 06/29/2018 Negative  Negative Final   • Leuk Esterase, UA 06/29/2018 Negative  Negative Final   • Nitrite, UA 06/29/2018 Negative  Negative Final   • Urobilinogen, UA 06/29/2018 1.0 E.U./dL  0.2 - 1.0 E.U./dL Final     No results found.      Assessment:  Patient Active Problem List   Diagnosis   • Rotator cuff tear arthropathy of right shoulder   • Status post total knee replacement, left         Plan:  The patient voiced understanding of the risks, benefits, and alternative forms of treatment that were discussed and the patient consents to proceed with Right reverse total shoulder arthroplasty.     After evaluation in the office, x-rays and  history, we have diagnosed rotator cuff arthropathy of the shoulder.  This is a degenerative condition with a chronic tear of the rotator cuff that is not repairable with loss of cartilage in the glenohumeral joint. The only definitive treatment for this condition is total shoulder arthroplasty or joint replacement.  Conservative measures have been used, including cortisone injections, NSAIDS, activity modification and physical therapy.  This will require 3-4 months of time for recovery. It starts with 4-6 weeks in a sling, followed by rehabilitation at physical therapy and exercises to be done at home.  With this type of implant, you will need to take oral antibiotics prior to all dental visits and for some procedures (for example: colonoscopy, skin lesion removal, etc).  This will be a single dose one hour prior to the procedure.      Risks and benefits of surgery were discussed with the patient in detail.  Risks include but are not limited to infection, myocardial infarction, stroke, DVT, pulmonary embolism, death, dislocation, neurovascular compromise, limb length discrepancy, revision surgery, limited range of motion, wound healing problems and scar tissue build-up.  Patient understands and agrees to proceed with surgery.    Discharge Plan: tomorrow to home and home health      Date: 7/10/2018    Naveen Michaud MD      DICTATED UTILIZING DRAGON DICTATION

## 2018-07-10 NOTE — ANESTHESIA PROCEDURE NOTES
Peripheral Block    Patient location during procedure: holding area  Start time: 7/10/2018 6:55 AM  Reason for block: at surgeon's request and post-op pain management  Performed by  Anesthesiologist: CHI PANIAGUA  Preanesthetic Checklist  Completed: patient identified, site marked, surgical consent, pre-op evaluation, timeout performed, IV checked, risks and benefits discussed and monitors and equipment checked  Prep:  Pt Position: sitting  Sterile barriers:cap, gloves and sterile barriers  Prep: ChloraPrep  Patient monitoring: blood pressure monitoring, continuous pulse oximetry and EKG  Procedure  Sedation:yes  Performed under: PNB  Guidance:ultrasound guided and nerve stimulator  Images:still images obtained    Laterality:right  Block Type:interscalene  Injection Technique:single-shot  Needle Type:echogenic  Needle Gauge:20 G    Catheter Size:20 G  Medications  Depomedrol:40 mg  Local Injected:ropivacaine 0.5% Local Amount Injected:30mL  Post Assessment  Patient Tolerance:comfortable throughout block  Complications:no

## 2018-07-10 NOTE — PLAN OF CARE
Problem: Patient Care Overview  Goal: Plan of Care Review  Outcome: Ongoing (interventions implemented as appropriate)   07/10/18 0152   Coping/Psychosocial   Plan of Care Reviewed With patient   Plan of Care Review   Progress improving   OTHER   Outcome Summary S/P R reverse total shoulder surgery. VSS. Block starting to wear off. Pain controlled with PO pain med. Pt very drowsy this shift, easy to arouse. Dressing c/d/i. DTV 1815. Discussed s/s n/v r/t PONV. No issues noted this shift. Plans to d/c home with family assist.      Goal: Individualization and Mutuality  Outcome: Ongoing (interventions implemented as appropriate)      Problem: Fall Risk (Adult)  Goal: Identify Related Risk Factors and Signs and Symptoms  Outcome: Outcome(s) achieved Date Met: 07/10/18    Goal: Absence of Fall  Outcome: Ongoing (interventions implemented as appropriate)      Problem: Shoulder Arthroplasty (Adult)  Goal: Signs and Symptoms of Listed Potential Problems Will be Absent, Minimized or Managed (Shoulder Arthroplasty)  Outcome: Ongoing (interventions implemented as appropriate)    Goal: Anesthesia/Sedation Recovery  Outcome: Ongoing (interventions implemented as appropriate)

## 2018-07-11 PROBLEM — E44.0 MODERATE MALNUTRITION (HCC): Status: ACTIVE | Noted: 2018-07-11

## 2018-07-11 LAB
HCT VFR BLD AUTO: 34.4 % (ref 35.6–45.5)
HGB BLD-MCNC: 10.9 G/DL (ref 11.9–15.5)

## 2018-07-11 PROCEDURE — 97165 OT EVAL LOW COMPLEX 30 MIN: CPT

## 2018-07-11 PROCEDURE — 99024 POSTOP FOLLOW-UP VISIT: CPT | Performed by: ORTHOPAEDIC SURGERY

## 2018-07-11 PROCEDURE — 85014 HEMATOCRIT: CPT | Performed by: ORTHOPAEDIC SURGERY

## 2018-07-11 PROCEDURE — 25010000002 ONDANSETRON PER 1 MG: Performed by: ORTHOPAEDIC SURGERY

## 2018-07-11 PROCEDURE — 63710000001 DIPHENHYDRAMINE PER 50 MG: Performed by: INTERNAL MEDICINE

## 2018-07-11 PROCEDURE — 85018 HEMOGLOBIN: CPT | Performed by: ORTHOPAEDIC SURGERY

## 2018-07-11 PROCEDURE — 25010000002 HYDROMORPHONE PER 4 MG: Performed by: INTERNAL MEDICINE

## 2018-07-11 RX ORDER — ONDANSETRON 4 MG/1
4 TABLET, FILM COATED ORAL EVERY 6 HOURS PRN
Qty: 20 TABLET | Refills: 0 | Status: SHIPPED | OUTPATIENT
Start: 2018-07-11 | End: 2018-11-20

## 2018-07-11 RX ORDER — DIPHENHYDRAMINE HCL 25 MG
25 CAPSULE ORAL EVERY 6 HOURS PRN
Status: DISCONTINUED | OUTPATIENT
Start: 2018-07-11 | End: 2018-07-12 | Stop reason: HOSPADM

## 2018-07-11 RX ORDER — HYDROCODONE BITARTRATE AND ACETAMINOPHEN 7.5; 325 MG/1; MG/1
TABLET ORAL
Qty: 50 TABLET | Refills: 0
Start: 2018-07-11 | End: 2018-11-20

## 2018-07-11 RX ADMIN — ONDANSETRON 4 MG: 2 INJECTION INTRAMUSCULAR; INTRAVENOUS at 03:45

## 2018-07-11 RX ADMIN — HYDROCODONE BITARTRATE AND ACETAMINOPHEN 1 TABLET: 7.5; 325 TABLET ORAL at 01:50

## 2018-07-11 RX ADMIN — DIPHENHYDRAMINE HYDROCHLORIDE 25 MG: 25 CAPSULE ORAL at 18:01

## 2018-07-11 RX ADMIN — MUPIROCIN 10 APPLICATION: 20 OINTMENT TOPICAL at 21:05

## 2018-07-11 RX ADMIN — HYDROCODONE BITARTRATE AND ACETAMINOPHEN 2 TABLET: 7.5; 325 TABLET ORAL at 13:42

## 2018-07-11 RX ADMIN — ONDANSETRON 4 MG: 2 INJECTION INTRAMUSCULAR; INTRAVENOUS at 08:35

## 2018-07-11 RX ADMIN — HYDROMORPHONE HYDROCHLORIDE 0.5 MG: 1 INJECTION, SOLUTION INTRAMUSCULAR; INTRAVENOUS; SUBCUTANEOUS at 03:45

## 2018-07-11 RX ADMIN — MUPIROCIN 10 APPLICATION: 20 OINTMENT TOPICAL at 07:56

## 2018-07-11 RX ADMIN — HYDROCODONE BITARTRATE AND ACETAMINOPHEN 2 TABLET: 7.5; 325 TABLET ORAL at 09:36

## 2018-07-11 RX ADMIN — HYDROCODONE BITARTRATE AND ACETAMINOPHEN 2 TABLET: 7.5; 325 TABLET ORAL at 18:01

## 2018-07-11 RX ADMIN — LEVOTHYROXINE SODIUM 75 MCG: 75 TABLET ORAL at 07:56

## 2018-07-11 RX ADMIN — SODIUM CHLORIDE, POTASSIUM CHLORIDE, SODIUM LACTATE AND CALCIUM CHLORIDE 75 ML/HR: 600; 310; 30; 20 INJECTION, SOLUTION INTRAVENOUS at 01:48

## 2018-07-11 RX ADMIN — HYDROCODONE BITARTRATE AND ACETAMINOPHEN 2 TABLET: 7.5; 325 TABLET ORAL at 23:50

## 2018-07-11 RX ADMIN — DIAZEPAM 2 MG: 2 TABLET ORAL at 21:05

## 2018-07-11 NOTE — PROGRESS NOTES
Malnutrition Severity Assessment    Patient Name:  Richelle Morelos  YOB: 1955  MRN: [id number]  Admit Date:  7/10/2018    Patient meets criteria for : Moderate malnutrition    Moderate malnutrition. BMI-17.1, 79% IBW, 12% weight loss over the past 6 months.  From nutrition focused physical exam, mild muscle and fat loss.    Malnutrition Type: Social/Environmental Circumstance Malnutrition     Malnutrition Type (last 8 hours)      Malnutrition Severity Assessment     Row Name 07/11/18 1351       Malnutrition Severity Assessment    Malnutrition Type Social/Environmental Circumstance Malnutrition    Row Name 07/11/18 1351       Physical Signs of Malnutrition (Social/Environmental)    Muscle Wasting Mild    Fat Loss Mild    Row Name 07/11/18 1351       Weight Status (Social/Environmental)    BMI Mild (<19)   BMI-17.1    %IBW Mod (<80%)   79% IBW    Weight Loss Severe (>10% / 6 mo)   12% weight loss in the past 6 months    Row Name 07/11/18 1351       Energy Intake Status (Social/Environmental)    Energy Intake Mod (<75% / > or equal to 3 mo)    Row Name 07/11/18 [facility]       Criteria Met (Must meet criteria for severity in at least 2 of these categories: M Wasting, Fat Loss, Fluid, Secondary Signs, Wt. Status, Intake)    Patient meets criteria for  Moderate malnutrition          Electronically signed by:  Erma Fam RD  07/11/18 1:55 PM

## 2018-07-11 NOTE — NURSING NOTE
Discussed with DR SHAH that pt reports severe pain and informed of meds given so far, orders given

## 2018-07-11 NOTE — PROGRESS NOTES
Orthopedic Progress Note      Patient: Richelle Morelos    YOB: 1955    Medical Record Number: [id number]    Attending Physician: Naveen Michaud MD    Date of Admission: 7/10/2018  5:11 AM    Admitting Dx:  Rotator cuff tear arthropathy of right shoulder [M12.811]  Rotator cuff tear arthropathy of right shoulder [M12.811]    Status Post: TOTAL SHOULDER REVERSE ARTHROPLASTY    Post Operative Day Number: 1    Current Problem List:   Patient Active Problem List   Diagnosis   • Rotator cuff tear arthropathy of right shoulder   • Status post total knee replacement, left   • PONV (postoperative nausea and vomiting)   • Hypothyroidism   • Anxiety   • Nausea & vomiting         Past Medical History:   Diagnosis Date   • Anxiety    • Arthritis     OSTEO   • History of headache    • Hypothyroidism    • PONV (postoperative nausea and vomiting)    • Right shoulder pain    • Slow to wake up after anesthesia    • Vision loss     RIGHT EYE       SUBJECTIVE: 63 y.o.  female. Drowsy, but arouses easily.  Complaints of N&V    OBJECTIVE:   Vitals:    07/10/18 1935 07/10/18 2310 07/11/18 0308 07/11/18 0742   BP: 114/56 133/69 135/68 130/68   BP Location: Left arm Left arm Left arm Left arm   Patient Position: Sitting Lying Lying Lying   Pulse: 75 77 97 87   Resp: 16 16 16 16   Temp: 96.6 °F (35.9 °C) 97 °F (36.1 °C) 96.8 °F (36 °C) 97.6 °F (36.4 °C)   TempSrc: Oral Oral Oral Oral   SpO2: 100% 99% 99% 97%   Weight:       Height:         I/O last 3 completed shifts:  In: 2406 [P.O.:340; I.V.:1966; IV Piggyback:100]  Out: 1300 [Urine:600; Emesis/NG output:600; Blood:100]    Current Medications:  Scheduled Meds:  levothyroxine 75 mcg Oral Daily   mupirocin  Each Nare BID     PRN Meds:.•  acetaminophen  •  bisacodyl  •  bisacodyl  •  diazePAM  •  docusate sodium  •  HYDROcodone-acetaminophen  •  HYDROcodone-acetaminophen  •  HYDROmorphone  •  magnesium hydroxide  •  [DISCONTINUED] Morphine **AND** naloxone  •  ondansetron  **OR** ondansetron ODT **OR** ondansetron  •  promethazine    Diagnostic Tests:   Lab Results (last 24 hours)     Procedure Component Value Units Date/Time    Hemoglobin & Hematocrit, Blood [[id number]]  (Abnormal) Collected:  07/11/18 0428    Specimen:  Blood Updated:  07/11/18 0518     Hemoglobin 10.9 (L) g/dL      Hematocrit 34.4 (L) %     Hemoglobin & Hematocrit, Blood [[id number]]  (Abnormal) Collected:  07/10/18 1138    Specimen:  Blood Updated:  07/10/18 1220     Hemoglobin 11.4 (L) g/dL      Hematocrit 34.5 (L) %           PHYSICAL EXAM: Right shoulder dressing dry and intact.  Good motion and sensation to right hand and wrist.  Radial and Axillary nerve intact.  N&V throughout the night.  ABD soft with BS.  Remains on oxygen.  Encouraged patient to increase activity as tolerated and frequent use of IS.     ASSESSMENT & PLAN:  Anxious to go home.  Plan home later today if nausea resolves. .          Date: 7/11/2018    EMEKA King MD

## 2018-07-11 NOTE — PLAN OF CARE
Problem: Patient Care Overview  Goal: Plan of Care Review  Outcome: Ongoing (interventions implemented as appropriate)   07/11/18 1530   Coping/Psychosocial   Plan of Care Reviewed With patient   Plan of Care Review   Progress improving   OTHER   Outcome Summary Pt POD 1. VSS. Non-weight bearing on RUE. Possible d/c tomorrow. Pt educated on BP monitoring. Pain controlled with PO pain medication at this time.      Goal: Individualization and Mutuality  Outcome: Ongoing (interventions implemented as appropriate)    Goal: Discharge Needs Assessment  Outcome: Ongoing (interventions implemented as appropriate)    Goal: Interprofessional Rounds/Family Conf  Outcome: Ongoing (interventions implemented as appropriate)      Problem: Fall Risk (Adult)  Goal: Absence of Fall  Outcome: Ongoing (interventions implemented as appropriate)      Problem: Shoulder Arthroplasty (Adult)  Goal: Signs and Symptoms of Listed Potential Problems Will be Absent, Minimized or Managed (Shoulder Arthroplasty)  Outcome: Ongoing (interventions implemented as appropriate)    Goal: Anesthesia/Sedation Recovery  Outcome: Outcome(s) achieved Date Met: 07/11/18      Problem: Nutrition, Imbalanced: Inadequate Oral Intake (Adult)  Goal: Identify Related Risk Factors and Signs and Symptoms  Outcome: Ongoing (interventions implemented as appropriate)    Goal: Improved Oral Intake  Outcome: Ongoing (interventions implemented as appropriate)    Goal: Prevent Further Weight Loss  Outcome: Ongoing (interventions implemented as appropriate)

## 2018-07-11 NOTE — CONSULTS
CONSULT NOTE    INTERNAL MEDICINE   [facility]       Referring Provider: Naveen Michaud MD  Reason for Consultation: To evaluate chronic medical conditions that may be exacerbated postoperatively  PCP: Belkis Berkowitz MD      HPI  Patient is a 63 y.o. female presents with history of postop nausea and vomiting who presents to [facility] due to rotator cuff injury and undergoing a right shoulder arthroplasty today.  Patient is in a significant amount of pain and has her right shoulder in a sling.  She has had nausea and vomiting after the surgery and continues to be nauseous.  She said she attempted to eat but it BACK up.      PAST MEDICAL HISTORY  Past Medical History:   Diagnosis Date   • Anxiety    • Arthritis     OSTEO   • History of headache    • Hypothyroidism    • PONV (postoperative nausea and vomiting)    • Right shoulder pain    • Slow to wake up after anesthesia    • Vision loss     RIGHT EYE       PAST SURGICAL HISTORY  Past Surgical History:   Procedure Laterality Date   • EYE SURGERY Right     SEVERAL TIME FOR HEMORRHAGE   • HYSTERECTOMY      ABD WITH CAILIN S AND O   • KNEE ARTHROPLASTY Left    • LAPAROSCOPIC CHOLECYSTECTOMY     • REFRACTIVE SURGERY Bilateral    • TEETH EXTRACTION         FAMILY HISTORY  Family History   Problem Relation Age of Onset   • Malig Hyperthermia Neg Hx        SOCIAL HISTORY  Social History   Substance Use Topics   • Smoking status: Former Smoker     Packs/day: 0.50     Years: 0.50     Types: Cigarettes   • Smokeless tobacco: Never Used      Comment: 1/2 PPD QUIT 47 YR AGO.    • Alcohol use No       MEDICATIONS:  Prescriptions Prior to Admission   Medication Sig Dispense Refill Last Dose   • diazePAM (VALIUM) 5 MG tablet Take 2.5-5 mg by mouth 2 (Two) Times a Day As Needed for Anxiety.   7/9/2018 at 2100   • HYDROcodone-acetaminophen (NORCO) 5-325 MG per tablet Take 1 tablet by mouth Every 12 (Twelve) Hours As Needed for Mild Pain  or Moderate Pain . 30  "tablet 0 7/9/2018 at 2200   • levothyroxine (SYNTHROID, LEVOTHROID) 75 MCG tablet Take 75 mcg by mouth Daily.   7/9/2018 at 0400   • mupirocin (BACTROBAN) 2 % ointment Apply 1 application topically 2 (Two) Times a Day. TO USE AS DIRECTED PREOP  BID DAY PRIOR TO OR AND AM PRIOR TO SURGERY   7/10/2018 at 0400       Allergies:  Morphine and related    Review of Systems:   joint pain,fatigue   Negative for following (except as per HPI):  Constitution: chills, fevers,   Eyes: change of vision, loss of vision and discharge  ENT: ear drainage, ear ringing and facial trauma  Respiratory: cough, pleuritic pain, shortness of air  Cardiovascular: chest pressure, pain, lower extremity edema, palpitations  Gastrointestinal: constipation, diarrhea, nausea, vomiting, pain    Integument: rash and wound  Hematologic / Lymphatic: excessive bleeding and easy bruising  Musculoskeletal:, joint swelling and muscle pain  Neurological: headaches, numbness, seizures and tremors  Behavioral / Psych: anxiety, depression and hallucinations         Vital Signs  Temp:  [96.6 °F (35.9 °C)-97.6 °F (36.4 °C)] 96.6 °F (35.9 °C)  Heart Rate:  [] 75  Resp:  [12-20] 16  BP: (105-154)/(51-76) 114/56  Flowsheet Rows      First Filed Value   Admission Height  157.5 cm (62.01\") Documented at 07/10/2018 1245   Admission Weight  42.6 kg (93 lb 14.7 oz) Documented at 07/10/2018 1245           Physical Exam:  General Appearance:    Alert, cooperative, in  mild distress due to pain   Head:    Normocephalic, without obvious abnormality, atraumatic   Eyes:         conjunctivae and sclerae normal, no icterus, PERRLA   ENT:    Ears grossly intact, oral mucosa moist, +Scopolamine patch behind left ear    Neck:   No adenopathy, supple, trachea midline,    Back:     Normal to inspection, range of motion normal   Lungs:     Clear to auscultation,respirations regular, even and                   unlabored    Heart:    Regular rhythm and normal rate,  no murmur, " normal S1 and S2,   Abdomen:     Normal bowel sounds, no masses,  soft non-tender, non-distended,    Extremities:    no cyanosis, no edema,    Right shoulder/arm in sling and ice         Pulses:   Pulses palpable and equal bilaterally   Skin:   No bleeding, rash, bruising    Neurologic:    Psych:   Cranial nerves 2 - 12 grossly intact, sensation intact,     equal bilateral strength BLE    Alert and Oriented x 3, Normal Affect     LABS:  Admission on 07/10/2018   Component Date Value Ref Range Status   • Hemoglobin 07/10/2018 11.4* 11.9 - 15.5 g/dL Final   • Hematocrit 07/10/2018 34.5* 35.6 - 45.5 % Final       DIAGNOSTICS:  Shoulder xray  FINDINGS: The joint replacement prosthesis is typical in appearance.  Alignment satisfactory. A complicating process not identified.  .     Results Review:   I reviewed the patient's new clinical results.  I personally viewed and interpreted the patient's EKG/Telemetry data- sinus rhythm, LVH heart rate 91  Old records reviewed preop labs showed a creatinine 0.62 GFR 97 hemoglobin 12.6    ASSESSMENT AND PLAN    +Nausea & vomiting and h/o  PONV (postoperative nausea and vomiting)  -Possibly multifactorial due to pain, could be side effects of pain medication as well as patient has a history of postop nausea and vomiting.  -Patient has IV Zofran ordered, will add when necessary Phenergan     + Rotator cuff tear arthropathy of right shoulder s/p Rt Total shoulder arthroplasty  Severe pain- patient has been ordered by mouth pain medication and it is not holding her currently.  Morphine has been ordered however she has an allergy to morphine therefore will DC that and put on when necessary Dilaudid iv     + Hypothyroidism- stable continue medical management     + Anxiety- she does receive when necessary Valium    +DVT proph: SCDs-deferred to primary  +full code    I discussed the patients findings and my recommendations with the patient and/or family.  Please reference all orders  placed.    Kecia Caballero MD  07/10/18  8:45 PM

## 2018-07-11 NOTE — PLAN OF CARE
Problem: Patient Care Overview  Goal: Plan of Care Review  Outcome: Ongoing (interventions implemented as appropriate)   07/11/18 5952   Coping/Psychosocial   Plan of Care Reviewed With patient   Plan of Care Review   Progress improving   OTHER   Outcome Summary vss, dsg c/d/i, neurovascular status wnl, voiding well, one episode n/v relieved with zofran, reports improved pain control with dilaudid for breakthru pain, voiding well, discussed coping mechanisms due to hx anxiety, possible discharge today     Goal: Individualization and Mutuality  Outcome: Ongoing (interventions implemented as appropriate)    Goal: Discharge Needs Assessment  Outcome: Ongoing (interventions implemented as appropriate)    Goal: Interprofessional Rounds/Family Conf  Outcome: Ongoing (interventions implemented as appropriate)      Problem: Fall Risk (Adult)  Goal: Absence of Fall  Outcome: Ongoing (interventions implemented as appropriate)      Problem: Shoulder Arthroplasty (Adult)  Goal: Signs and Symptoms of Listed Potential Problems Will be Absent, Minimized or Managed (Shoulder Arthroplasty)  Outcome: Ongoing (interventions implemented as appropriate)    Goal: Anesthesia/Sedation Recovery  Outcome: Ongoing (interventions implemented as appropriate)

## 2018-07-11 NOTE — THERAPY DISCHARGE NOTE
Acute Care - Occupational Therapy Initial Eval/Discharge  [facility]     Patient Name: Richelle Morelos  : 1955  MRN: [id number]  Today's Date: 2018               Admit Date: 7/10/2018       ICD-10-CM ICD-9-CM   1. Acute blood loss as cause of postoperative anemia D62 285.1   2. Rotator cuff tear arthropathy of right shoulder M12.811 716.81   3. Rotator cuff arthropathy, right M12.811 716.81     Patient Active Problem List   Diagnosis   • Rotator cuff tear arthropathy of right shoulder   • Status post total knee replacement, left   • PONV (postoperative nausea and vomiting)   • Hypothyroidism   • Anxiety   • Nausea & vomiting     Past Medical History:   Diagnosis Date   • Anxiety    • Arthritis     OSTEO   • History of headache    • Hypothyroidism    • PONV (postoperative nausea and vomiting)    • Right shoulder pain    • Slow to wake up after anesthesia    • Vision loss     RIGHT EYE     Past Surgical History:   Procedure Laterality Date   • EYE SURGERY Right     SEVERAL TIME FOR HEMORRHAGE   • HYSTERECTOMY      ABD WITH CAILIN S AND O   • KNEE ARTHROPLASTY Left    • LAPAROSCOPIC CHOLECYSTECTOMY     • REFRACTIVE SURGERY Bilateral    • TEETH EXTRACTION     • TOTAL SHOULDER ARTHROPLASTY W/ DISTAL CLAVICLE EXCISION Right 7/10/2018    Procedure: TOTAL SHOULDER REVERSE ARTHROPLASTY;  Surgeon: Naveen Michaud MD;  Location: [facility] MAIN OR;  Service: Orthopedics          OT ASSESSMENT FLOWSHEET (last 72 hours)      Occupational Therapy Evaluation     Row Name 18 1248                   OT Evaluation Time/Intention    Subjective Information complains of;nausea/vomiting  -SG        Document Type evaluation;discharge evaluation/summary  -SG        Mode of Treatment occupational therapy  -SG           General Information    Patient Profile Reviewed? yes  -SG        Patient Observations alert;cooperative;agree to therapy  -SG        Prior Level of Function independent:;ADL's  -SG        Existing  Precautions/Restrictions --   RUE in sling  -SG           Cognitive Assessment/Intervention- PT/OT    Orientation Status (Cognition) oriented x 4  -SG        Follows Commands (Cognition) WFL  -SG           ADL Assessment/Intervention    BADL Assessment/Intervention upper body dressing;bathing;grooming  -SG           Bathing Assessment/Intervention    Comment (Bathing) pt instructed with safety for bathing tasks. States will have assist as needed  -SG           Upper Body Dressing Assessment/Training    Upper Body Dressing [place] Level upper body dressing skills  -SG        Assistive Devices (Upper Body Dressing) one hand technique  -SG        Upper Body Dressing Position supported sitting  -SG        Comment (Upper Body Dressing) instructed with one handed dress technique and safety for adls. Pt states good knowledge.   -SG           BADL Safety/Performance    Impairments, BADL Safety/Performance range of motion   RUE in sling, immobilized  -SG        Progress in BADL Status --   pt becomes nauseated , nsg notified and with pt  -SG           Positioning and Restraints    Pre-Treatment Position in bed  -SG        Post Treatment Position bed  -SG        In Bed sitting;call light within reach;encouraged to call for assist;exit alarm on  -SG           Wound 07/10/18 0850 Right shoulder incision    Wound - Properties Group Date first assessed: 07/10/18  -GISELL Time first assessed: 0850  -GISELL Side: Right  -GISELL Location: shoulder  -GISELL Type: incision  -GISELL       Plan of Care Review    Plan of Care Reviewed With patient  -SG           Clinical Impression (OT)    OT Diagnosis need for assist with personal care  -SG           Patient Education Goal (OT)    Activity (Patient Education Goal, OT) pt to state good knowledge for one handed dressing technique  -SG        [place]/Cues/Accuracy (Memory Goal 2, OT) verbalizes understanding  -SG        Time Frame (Patient Education Goal, OT) 1 day  -SG        Progress/Outcome  (Patient Education Goal, OT) goal met  -SG          User Key  (r) = Recorded By, (t) = Taken By, (c) = Cosigned By    Initials Name Effective Dates     KATIE Gonzalez 06/08/18 -     GISELL Zurita RN 02/18/16 -           Occupational Therapy Education     Title: PT OT SLP Therapies (Resolved)     Topic: Occupational Therapy (Resolved)     Point: ADL training (Resolved)     Description: Instruct learner(s) on proper safety adaptation and remediation techniques during self care or transfers.   Instruct in proper use of assistive devices.   Learning Progress Summary     Learner Status Readiness Method Response Comment Documented by    Patient Done Acceptance E,TB,D VU   07/11/18 1255                      User Key     Initials Effective Dates Name Provider Type Discipline     06/08/18 -  KATIE Gonzalez Occupational Therapist OT                OT Recommendation and Plan     Plan of Care Review  Plan of Care Reviewed With: patient  Plan of Care Reviewed With: patient  Outcome Summary: Pt educated with one handed dressing technique and safety for adls. Pt states good knowledge for technique           OT Rehab Goals     Row Name 07/11/18 1248             Patient Education Goal (OT)    Activity (Patient Education Goal, OT) pt to state good knowledge for one handed dressing technique  -SG      [place]/Cues/Accuracy (Memory Goal 2, OT) verbalizes understanding  -SG      Time Frame (Patient Education Goal, OT) 1 day  -SG      Progress/Outcome (Patient Education Goal, OT) goal met  -SG        User Key  (r) = Recorded By, (t) = Taken By, (c) = Cosigned By    Initials Name Provider Type Discipline     KATIE Gonzalez Occupational Therapist OT                Outcome Measures     Row Name 07/11/18 1256             How much help from another is currently needed...    Putting on and taking off regular lower body clothing? 2  -SG      Bathing (including washing, rinsing, and drying) 3  -SG      Toileting  (which includes using toilet bed pan or urinal) 3  -SG      Putting on and taking off regular upper body clothing 3  -SG      Taking care of personal grooming (such as brushing teeth) 3  -SG      Eating meals 3  -SG      Score 17  -SG         Functional Assessment    Outcome Measure Options AM-PAC 6 Clicks Daily Activity (OT)  -SG        User Key  (r) = Recorded By, (t) = Taken By, (c) = Cosigned By    Initials Name Provider Type    KATIE Mendoza Occupational Therapist          Time Calculation:         Time Calculation- OT     Row Name 07/11/18 1257             Time Calculation- OT    OT Start Time 0822  -SG      OT Stop Time 0834  -      OT Time Calculation (min) 12 min  -SG      OT Received On 07/11/18  -        User Key  (r) = Recorded By, (t) = Taken By, (c) = Cosigned By    Initials Name Provider Type    KATIE Mendoza Occupational Therapist        Therapy Suggested Charges     Code   Minutes Charges    None           Therapy Charges for Today     Code Description Service Date Service Provider Modifiers Qty    [id number]  OT EVAL LOW COMPLEXITY 2 7/11/2018 KATIE Gonzalez GO 1               OT Discharge Summary  Reason for Discharge: other (comment) (pt denies need for further OT at this time)    KATIE Gonzalez  7/11/2018

## 2018-07-11 NOTE — SIGNIFICANT NOTE
07/11/18 0950   Rehab Time/Intention   Evaluation Not Performed other (see comments)  (Spoke w pt and reviewed and demonstrated HEP. Written handout provided. Pt verbalized understanding and w no further questions at this time. )   Rehab Treatment   Discipline physical therapist

## 2018-07-11 NOTE — PLAN OF CARE
Problem: Patient Care Overview  Goal: Plan of Care Review   07/11/18 3087   Coping/Psychosocial   Plan of Care Reviewed With patient   OTHER   Outcome Summary Pt educated with one handed dressing technique and safety for adls. Pt states good knowledge for technique

## 2018-07-11 NOTE — PROGRESS NOTES
Name: Richelle Morelos ADMIT: 7/10/2018   : 1955  PCP: Belkis Berkowitz MD    MRN: [id number] LOS: 1 days   AGE/SEX: 63 y.o. female  ROOM: [facility]   Subjective     Nausea and vomiting continues but so far has tolerated dinner.  Did not do so well with lunch.  No flatus or bowel movement yet.  No chest pain or shortness of breath.  Objective   Vital Signs  Temp:  [96.6 °F (35.9 °C)-97.6 °F (36.4 °C)] 97.6 °F (36.4 °C)  Heart Rate:  [75-97] 87  Resp:  [16] 16  BP: (114-135)/(56-69) 126/67  SpO2:  [97 %-100 %] 97 %  on  Flow (L/min):  [2] (P) 2;   Device (Oxygen Therapy): (P) nasal cannula  Body mass index is 17.17 kg/m².    Physical Exam   Constitutional: No distress.   HENT:   Head: Normocephalic and atraumatic.   Mouth/Throat: No oropharyngeal exudate.   Neck: No JVD present.   Cardiovascular: Normal rate and regular rhythm.  Exam reveals no friction rub.    No murmur heard.  Pulmonary/Chest: Effort normal and breath sounds normal. No respiratory distress. She has no wheezes.   Abdominal: Soft. Bowel sounds are normal. She exhibits no distension. There is no tenderness.   Musculoskeletal: She exhibits deformity (Right shoulder in sling).   Neurological: She is alert.   Skin: She is not diaphoretic. No erythema. No pallor.   Psychiatric: She has a normal mood and affect. Her behavior is normal.   Vitals reviewed.      Results Review:       I reviewed the patient's new clinical results.    Results from last 7 days  Lab Units 18  0428 07/10/18  1138   HEMOGLOBIN g/dL 10.9* 11.4*     Estimated Creatinine Clearance: 62.5 mL/min (by C-G formula based on SCr of 0.62 mg/dL).        levothyroxine 75 mcg Oral Daily   mupirocin  Each Nare BID       lactated ringers 75 mL/hr Last Rate: 75 mL/hr (18 0148)   Diet Regular      Assessment/Plan      Active Hospital Problems    Diagnosis Date Noted   • **Rotator cuff tear arthropathy of right shoulder [M12.811] 2018   • PONV (postoperative nausea and  vomiting) [R11.2, Z98.890] 07/10/2018   • Hypothyroidism [E03.9] 07/10/2018   • Anxiety [F41.9] 07/10/2018   • Nausea & vomiting [R11.2] 07/10/2018      Resolved Hospital Problems    Diagnosis Date Noted Date Resolved   No resolved problems to display.     Nausea & vomiting and h/o  PONV (postoperative nausea and vomiting)  -multifactorial due to pain, could be side effects of pain medication as well as patient has a history of postop nausea and vomiting.  -cont  IV Zofran and Phenergan     Rotator cuff tear arthropathy of right shoulder s/p Rt Total shoulder arthroplasty  Severe pain- PO and when necessary Dilaudid iv (this really makes her nauseated       Hypothyroidism- stable continue medical management     Anxiety- she does receive when necessary Valium     Itching: from pain meds, give prn benadryl    Moderate malnutrition: add ensure with meals    DVT proph: SCDs-deferred to primary    full code        Wale Vásquez MD  [facility]  07/11/18  5:15 PM

## 2018-07-12 VITALS
RESPIRATION RATE: 18 BRPM | TEMPERATURE: 99.2 F | SYSTOLIC BLOOD PRESSURE: 144 MMHG | DIASTOLIC BLOOD PRESSURE: 69 MMHG | HEART RATE: 60 BPM | BODY MASS INDEX: 17.28 KG/M2 | HEIGHT: 62 IN | WEIGHT: 93.92 LBS | OXYGEN SATURATION: 98 %

## 2018-07-12 LAB
ANION GAP SERPL CALCULATED.3IONS-SCNC: 10 MMOL/L
BUN BLD-MCNC: 8 MG/DL (ref 8–23)
BUN/CREAT SERPL: 18.6 (ref 7–25)
CALCIUM SPEC-SCNC: 8.4 MG/DL (ref 8.6–10.5)
CHLORIDE SERPL-SCNC: 100 MMOL/L (ref 98–107)
CO2 SERPL-SCNC: 30 MMOL/L (ref 22–29)
CREAT BLD-MCNC: 0.43 MG/DL (ref 0.57–1)
GFR SERPL CREATININE-BSD FRML MDRD: 148 ML/MIN/1.73
GLUCOSE BLD-MCNC: 104 MG/DL (ref 65–99)
HCT VFR BLD AUTO: 32.7 % (ref 35.6–45.5)
HGB BLD-MCNC: 10.3 G/DL (ref 11.9–15.5)
POTASSIUM BLD-SCNC: 3.9 MMOL/L (ref 3.5–5.2)
SODIUM BLD-SCNC: 140 MMOL/L (ref 136–145)

## 2018-07-12 PROCEDURE — 80048 BASIC METABOLIC PNL TOTAL CA: CPT | Performed by: INTERNAL MEDICINE

## 2018-07-12 PROCEDURE — 99024 POSTOP FOLLOW-UP VISIT: CPT | Performed by: ORTHOPAEDIC SURGERY

## 2018-07-12 PROCEDURE — 85018 HEMOGLOBIN: CPT | Performed by: ORTHOPAEDIC SURGERY

## 2018-07-12 PROCEDURE — 85014 HEMATOCRIT: CPT | Performed by: ORTHOPAEDIC SURGERY

## 2018-07-12 RX ADMIN — HYDROCODONE BITARTRATE AND ACETAMINOPHEN 2 TABLET: 7.5; 325 TABLET ORAL at 04:35

## 2018-07-12 RX ADMIN — HYDROCODONE BITARTRATE AND ACETAMINOPHEN 2 TABLET: 7.5; 325 TABLET ORAL at 11:52

## 2018-07-12 RX ADMIN — MUPIROCIN 10 APPLICATION: 20 OINTMENT TOPICAL at 07:43

## 2018-07-12 RX ADMIN — LEVOTHYROXINE SODIUM 75 MCG: 75 TABLET ORAL at 07:43

## 2018-07-12 NOTE — PLAN OF CARE
Problem: Patient Care Overview  Goal: Plan of Care Review  Outcome: Ongoing (interventions implemented as appropriate)   07/12/18 0206   Coping/Psychosocial   Plan of Care Reviewed With patient   Plan of Care Review   Progress improving   OTHER   Outcome Summary POD 2. PO PAIN MEDICATION HELPING WITH PAIN. AMBULATING WELL WITH 1 ASSIST. SLING TO RIGHT HAND. POSSIBLE D/C TODAY. EDUCATION PROVIDED ON PONV AND MEDICATION AVAILABLE IF NEEDED     Goal: Individualization and Mutuality  Outcome: Ongoing (interventions implemented as appropriate)    Goal: Discharge Needs Assessment  Outcome: Ongoing (interventions implemented as appropriate)      Problem: Fall Risk (Adult)  Goal: Absence of Fall  Outcome: Ongoing (interventions implemented as appropriate)      Problem: Shoulder Arthroplasty (Adult)  Goal: Signs and Symptoms of Listed Potential Problems Will be Absent, Minimized or Managed (Shoulder Arthroplasty)  Outcome: Ongoing (interventions implemented as appropriate)      Problem: Nutrition, Imbalanced: Inadequate Oral Intake (Adult)  Goal: Identify Related Risk Factors and Signs and Symptoms  Outcome: Outcome(s) achieved Date Met: 07/12/18    Goal: Improved Oral Intake  Outcome: Ongoing (interventions implemented as appropriate)    Goal: Prevent Further Weight Loss  Outcome: Ongoing (interventions implemented as appropriate)

## 2018-07-12 NOTE — PLAN OF CARE
Problem: Patient Care Overview  Goal: Plan of Care Review  Outcome: Ongoing (interventions implemented as appropriate)   07/12/18 6845   Coping/Psychosocial   Plan of Care Reviewed With patient   Plan of Care Review   Progress improving   OTHER   Outcome Summary POD2 from R reverse total shoulder. PO pian medications helping with pain. Sling on right arm. DC home with home health today. VSS. Pt educated on BP monitoring.      Goal: Discharge Needs Assessment  Outcome: Ongoing (interventions implemented as appropriate)    Goal: Interprofessional Rounds/Family Conf  Outcome: Ongoing (interventions implemented as appropriate)      Problem: Fall Risk (Adult)  Goal: Absence of Fall  Outcome: Ongoing (interventions implemented as appropriate)      Problem: Shoulder Arthroplasty (Adult)  Goal: Signs and Symptoms of Listed Potential Problems Will be Absent, Minimized or Managed (Shoulder Arthroplasty)  Outcome: Ongoing (interventions implemented as appropriate)      Problem: Nutrition, Imbalanced: Inadequate Oral Intake (Adult)  Goal: Improved Oral Intake  Outcome: Ongoing (interventions implemented as appropriate)    Goal: Prevent Further Weight Loss  Outcome: Ongoing (interventions implemented as appropriate)

## 2018-07-16 ENCOUNTER — TELEPHONE (OUTPATIENT)
Dept: ORTHOPEDIC SURGERY | Facility: CLINIC | Age: 63
End: 2018-07-16

## 2018-07-27 ENCOUNTER — OFFICE VISIT (OUTPATIENT)
Dept: ORTHOPEDIC SURGERY | Facility: CLINIC | Age: 63
End: 2018-07-27

## 2018-07-27 DIAGNOSIS — Z96.611 S/P REVERSE TOTAL SHOULDER ARTHROPLASTY, RIGHT: Primary | ICD-10-CM

## 2018-07-27 PROCEDURE — 99024 POSTOP FOLLOW-UP VISIT: CPT | Performed by: ORTHOPAEDIC SURGERY

## 2018-07-27 NOTE — PROGRESS NOTES
Chief Complaint   Patient presents with   • Right Shoulder - Post-op         HPI the patient is here today for a follow up of her reverse total shoulder arthroplasty that was done on 07/10/18.  She is doing quite well at this point.  There are no fevers, rigors or chills.  She is neurovascularly intact.  She is moving her fingers well.  She states that the sharp grinding pain that she had in her shoulder has fully resolved.  She was not able to tolerate her narcotic medication because of nausea and has switched off from her pain pills.  She is using some ibuprofen and Tylenol for pain control.  Overall, she is very pleased with her surgical outcome.        There were no vitals filed for this visit.        Joint/Body Part Specific Exam:  right  shoulder. Patient is postoperative 2 week(s). Afebrile. Vital signs are stable. Deltopectoral incision is clean and healing well. Axillary nerve function is well preserved. There is no glenohumeral instability. No clicking, popping or catching is noted. Apprehension sign is negative. Axillary nerve function is well preserved. Radial artery pulses are palpable. There is no clinical deformity. Range of motion is flexion 0-30° degrees, abduction 0-40° degrees.      X-RAY REPORT:        Richelle was seen today for post-op.    Diagnoses and all orders for this visit:    S/P reverse total shoulder arthroplasty, right  -     Ambulatory Referral to Physical Therapy Evaluate and treat            Procedures        Instructions:      Plan: Incision care.    Falls precaution.    Continue to use the sling shot to protect the shoulder replacement surgery.    Dislocation precautions.    Tablet ibuprofen 400 mg orally twice a day for pain swelling and discomfort.    Note for physical therapy given to the patient and she will follow the reverse total shoulder arthroplasty protocols.    She is currently off work.    Follow-up in my office in 4 weeks for reevaluation

## 2018-07-29 PROBLEM — Z96.611 S/P REVERSE TOTAL SHOULDER ARTHROPLASTY, RIGHT: Status: ACTIVE | Noted: 2018-07-29

## 2018-08-02 ENCOUNTER — OFFICE VISIT CONVERTED (OUTPATIENT)
Dept: FAMILY MEDICINE CLINIC | Age: 63
End: 2018-08-02
Attending: FAMILY MEDICINE

## 2018-09-05 ENCOUNTER — OFFICE VISIT CONVERTED (OUTPATIENT)
Dept: FAMILY MEDICINE CLINIC | Age: 63
End: 2018-09-05
Attending: NURSE PRACTITIONER

## 2018-09-11 ENCOUNTER — OFFICE VISIT (OUTPATIENT)
Dept: ORTHOPEDIC SURGERY | Facility: CLINIC | Age: 63
End: 2018-09-11

## 2018-09-11 DIAGNOSIS — E44.0 MODERATE MALNUTRITION (HCC): ICD-10-CM

## 2018-09-11 DIAGNOSIS — Z96.611 S/P REVERSE TOTAL SHOULDER ARTHROPLASTY, RIGHT: Primary | ICD-10-CM

## 2018-09-11 PROCEDURE — 99024 POSTOP FOLLOW-UP VISIT: CPT | Performed by: ORTHOPAEDIC SURGERY

## 2018-09-11 NOTE — PROGRESS NOTES
Chief Complaint   Patient presents with   • Right Shoulder - Post-op         HPI the patient is here today for a follow up of her right reverse total shoulder arthroplasty that was done on 7/10/18. She is doing very well 8 weeks post op.  There are no fevers, rigors or chills.  Range of motion is improving progressively with physical therapy.  She still has a sense of weakness in the shoulder and needs to work hard on getting the strength of the rotator cuff function back.  Her incision is completely healed.  She is very pleased with her outcome because she is not taking any narcotics for pain control and states that the shoulder replacement surgery was very beneficial in improving her pain profile than improving her quality of life is well.  She is not quite ready to go back to work just yet.        There were no vitals filed for this visit.        Joint/Body Part Specific Exam:  right  shoulder. Patient is postoperative 8 week(s). Afebrile. Vital signs are stable. Deltopectoral incision is clean and healing well. Axillary nerve function is well preserved. There is no glenohumeral instability. No clicking, popping or catching is noted. Apprehension sign is negative. Axillary nerve function is well preserved. Radial artery pulses are palpable. There is no clinical deformity. Range of motion is flexion 0-120° degrees, abduction 0-120° degrees.      X-RAY REPORT:        Richelle was seen today for post-op.    Diagnoses and all orders for this visit:    S/P reverse total shoulder arthroplasty, right    Moderate malnutrition (CMS/[facility])            Procedures        Instructions:      Plan: Incision care.    Follow the reverse total shoulder arthroplasty protocol.    Dislocation precaution.    Ice to the shoulder.    Continue with physical therapy.    Note for the patient to be off work given to her.    High protein intake to improve the muscle mass and strength of shoulder function.    Follow-up in my office in 8 weeks for  reevaluation and repeat x-rays at that visit.

## 2018-09-24 ENCOUNTER — OFFICE VISIT CONVERTED (OUTPATIENT)
Dept: FAMILY MEDICINE CLINIC | Age: 63
End: 2018-09-24
Attending: NURSE PRACTITIONER

## 2018-10-16 ENCOUNTER — OFFICE VISIT (OUTPATIENT)
Dept: ORTHOPEDIC SURGERY | Facility: CLINIC | Age: 63
End: 2018-10-16

## 2018-10-16 VITALS — HEIGHT: 62 IN | BODY MASS INDEX: 17.48 KG/M2 | WEIGHT: 95 LBS

## 2018-10-16 DIAGNOSIS — M25.512 LEFT SHOULDER PAIN, UNSPECIFIED CHRONICITY: Primary | ICD-10-CM

## 2018-10-16 DIAGNOSIS — Z96.611 S/P REVERSE TOTAL SHOULDER ARTHROPLASTY, RIGHT: ICD-10-CM

## 2018-10-16 PROCEDURE — 73020 X-RAY EXAM OF SHOULDER: CPT | Performed by: ORTHOPAEDIC SURGERY

## 2018-10-16 PROCEDURE — 99213 OFFICE O/P EST LOW 20 MIN: CPT | Performed by: ORTHOPAEDIC SURGERY

## 2018-10-16 NOTE — PROGRESS NOTES
Chief Complaint   Patient presents with   • Left Shoulder - Pain             HPI the patient is here today for left shoulder pain and discomfort that has been on going for about 3 months.  She has weakness in abduction of the left shoulder.  She denies any history of trauma.  She has progressive loss of range of motion especially in the overhead, abducted position.  Cross body activities bother the patient significantly.  She finds it difficult to sleep in bed at night because of the pain and discomfort.  She has recently had a right reverse total shoulder arthroplasty performed by me on 10 July 2018.  She has done very well with that surgical intervention.  She has recovered a full range of motion along with physical therapy.  She states that she has no pain or discomfort on the right side and is very pleased with her outcome.  In fact she states that her quality of life has improved tremendously.          Allergies   Allergen Reactions   • Morphine And Related Nausea Only and Palpitations         Social History     Social History   • Marital status:      Spouse name: N/A   • Number of children: N/A   • Years of education: N/A     Occupational History   • Not on file.     Social History Main Topics   • Smoking status: Former Smoker     Packs/day: 0.50     Years: 0.50     Types: Cigarettes   • Smokeless tobacco: Never Used      Comment: 1/2 PPD QUIT 47 YR AGO.    • Alcohol use No   • Drug use: No   • Sexual activity: Not on file     Other Topics Concern   • Not on file     Social History Narrative   • No narrative on file       Family History   Problem Relation Age of Onset   • Malig Hyperthermia Neg Hx        Past Surgical History:   Procedure Laterality Date   • EYE SURGERY Right     SEVERAL TIME FOR HEMORRHAGE   • HYSTERECTOMY      ABD WITH CAILIN S AND O   • KNEE ARTHROPLASTY Left    • LAPAROSCOPIC CHOLECYSTECTOMY     • REFRACTIVE SURGERY Bilateral    • TEETH EXTRACTION     • TOTAL SHOULDER ARTHROPLASTY W/  DISTAL CLAVICLE EXCISION Right 7/10/2018    Procedure: TOTAL SHOULDER REVERSE ARTHROPLASTY;  Surgeon: Naveen Michaud MD;  Location: [facility];  Service: Orthopedics       Past Medical History:   Diagnosis Date   • Anxiety    • Arthritis     OSTEO   • History of headache    • Hypothyroidism    • PONV (postoperative nausea and vomiting)    • Right shoulder pain    • Slow to wake up after anesthesia    • Vision loss     RIGHT EYE           There were no vitals filed for this visit.          Review of Systems   Constitutional: Negative.    HENT: Negative.    Eyes: Negative.    Respiratory: Negative.    Cardiovascular: Negative.    Gastrointestinal: Negative.    Endocrine: Negative.    Genitourinary: Negative.    Musculoskeletal: Positive for joint swelling.   Skin: Negative.    Allergic/Immunologic: Negative.    Neurological: Negative.    Hematological: Negative.            Physical Exam   Constitutional: She is oriented to person, place, and time. She appears well-nourished.   HENT:   Head: Atraumatic.   Eyes: EOM are normal.   Neck: Neck supple.   Cardiovascular: Normal rate and intact distal pulses.    Pulmonary/Chest: Breath sounds normal.   Abdominal: Bowel sounds are normal.   Musculoskeletal: She exhibits edema and tenderness.   Neurological: She is alert and oriented to person, place, and time.   Skin: Skin is warm. Capillary refill takes 2 to 3 seconds.   Psychiatric: She has a normal mood and affect. Her behavior is normal.   Nursing note and vitals reviewed.              Joint/Body Part Specific Exam:  right  shoulder. Patient is postoperative 3.5 month(s). Afebrile. Vital signs are stable. Deltopectoral incision is clean and healing well. Axillary nerve function is well preserved. There is no glenohumeral instability. No clicking, popping or catching is noted. Apprehension sign is negative. Axillary nerve function is well preserved. Radial artery pulses are palpable. There is no clinical deformity. Range  of motion is flexion 0-130° degrees, abduction 0-140° degrees.    left Shoulder. Patient has signs of impingement with internal and external rotation. There is a lot of pain and tenderness for the patient over the subcromial bursa. Pena’s sign is positive. Neer sign is positive. Forward flexion is 0-100° degrees, abduction is 0-120° degrees, external rotation is 0-40° degrees. Rotator cuff function is fairly well preserved except for the impingement at 90 degrees. Apprehension sign is negative. Axillary nerve function is well preserved. Radial artery pulses are palpable. There is no evidence of multidirectional instability. Sulcus sign is negative. Drop arm sign is negative. The patient has some ill-defined tenderness over the greater tuberosity of the humerus. The pain level is 6.      X-RAY Report:  right Shoulder X-Ray  Indication: Evaluation of implant position after reverse total shoulder arthroplasty  AP and Lateral  Findings: Well placed implants with a good press-fit profile without any periprosthetic fractures  no bony lesion  Soft tissues within normal limits  within normal limits joint spaces  Hardware appropriately positioned not applicable      yes prior studies available for comparison.    X-RAY was ordered and reviewed by Naveen Michaud MD              Diagnostics:            Richelle was seen today for pain.    Diagnoses and all orders for this visit:    Left shoulder pain, unspecified chronicity  -     XR Shoulder 1 View Left    S/P reverse total shoulder arthroplasty, right            Procedures          I provided this patient with educational materials regarding exercise and bone health.        Plan: Stretching and strengthening exercises with following of the reverse total shoulder arthroplasty protocol with physical therapy.    Dislocation precautions.    Tablet ibuprofen 600 mg orally daily at bedtime for pain swelling and discomfort.    She has quite a bit of symptoms on the left side and will  need a left shoulder MRI in the future when she calls us back with progressive symptoms.    , GI and dental procedure prophylaxis with antibiotics to prevent a metastatic infection of the shoulder arthroplasty implants.    Follow-up in my office in 3 months          CC To Belkis Berkowitz MD

## 2018-11-08 ENCOUNTER — TELEPHONE (OUTPATIENT)
Dept: ORTHOPEDIC SURGERY | Facility: CLINIC | Age: 63
End: 2018-11-08

## 2018-11-08 DIAGNOSIS — M25.512 ACUTE PAIN OF LEFT SHOULDER: Primary | ICD-10-CM

## 2018-11-08 NOTE — TELEPHONE ENCOUNTER
Patient called stating she does want to proceed with a MRI of her left shoulder. I have entered these orders. Patient would like this at [facility].

## 2018-11-20 ENCOUNTER — OFFICE VISIT (OUTPATIENT)
Dept: ORTHOPEDIC SURGERY | Facility: CLINIC | Age: 63
End: 2018-11-20

## 2018-11-20 VITALS — HEIGHT: 62 IN | BODY MASS INDEX: 17.48 KG/M2 | WEIGHT: 95 LBS

## 2018-11-20 DIAGNOSIS — Z96.611 S/P REVERSE TOTAL SHOULDER ARTHROPLASTY, RIGHT: ICD-10-CM

## 2018-11-20 DIAGNOSIS — M75.122 COMPLETE TEAR OF LEFT ROTATOR CUFF: Primary | ICD-10-CM

## 2018-11-20 PROCEDURE — 99213 OFFICE O/P EST LOW 20 MIN: CPT | Performed by: ORTHOPAEDIC SURGERY

## 2018-12-02 PROBLEM — M75.122 COMPLETE TEAR OF LEFT ROTATOR CUFF: Status: ACTIVE | Noted: 2018-12-02

## 2019-01-09 ENCOUNTER — OFFICE VISIT CONVERTED (OUTPATIENT)
Dept: FAMILY MEDICINE CLINIC | Age: 64
End: 2019-01-09
Attending: FAMILY MEDICINE

## 2019-01-24 ENCOUNTER — OFFICE VISIT CONVERTED (OUTPATIENT)
Dept: FAMILY MEDICINE CLINIC | Age: 64
End: 2019-01-24
Attending: NURSE PRACTITIONER

## 2019-02-05 ENCOUNTER — HOSPITAL ENCOUNTER (OUTPATIENT)
Dept: OTHER | Facility: HOSPITAL | Age: 64
Discharge: HOME | End: 2019-02-05
Attending: FAMILY MEDICINE

## 2019-02-05 LAB
25(OH)D3 SERPL-MCNC: 57.3 NG/ML (ref 30–100)
ALBUMIN SERPL-MCNC: 4 G/DL (ref 3.5–5)
ALBUMIN/GLOB SERPL: 1.5 {RATIO} (ref 1.4–2.6)
ALP SERPL-CCNC: 43 U/L (ref 43–160)
ALT SERPL-CCNC: 15 U/L (ref 10–40)
ANION GAP SERPL CALC-SCNC: 15 MMOL/L (ref 8–19)
AST SERPL-CCNC: 19 U/L (ref 15–50)
BASOPHILS # BLD MANUAL: 0.06 10*3/UL (ref 0–0.2)
BASOPHILS NFR BLD MANUAL: 0.9 % (ref 0–3)
BILIRUB SERPL-MCNC: 0.54 MG/DL (ref 0.2–1.3)
BUN SERPL-MCNC: 11 MG/DL (ref 5–25)
BUN/CREAT SERPL: 19 {RATIO} (ref 6–20)
CALCIUM SERPL-MCNC: 9.4 MG/DL (ref 8.7–10.4)
CHLORIDE SERPL-SCNC: 103 MMOL/L (ref 99–111)
CHOLEST SERPL-MCNC: 203 MG/DL (ref 107–200)
CHOLEST/HDLC SERPL: 2.4 {RATIO} (ref 3–6)
CONV CO2: 26 MMOL/L (ref 22–32)
CONV TOTAL PROTEIN: 6.6 G/DL (ref 6.3–8.2)
CREAT UR-MCNC: 0.59 MG/DL (ref 0.5–0.9)
DEPRECATED RDW RBC AUTO: 46.2 FL
EOSINOPHIL # BLD MANUAL: 0.24 10*3/UL (ref 0–0.7)
EOSINOPHIL NFR BLD MANUAL: 3.4 % (ref 0–7)
ERYTHROCYTE [DISTWIDTH] IN BLOOD BY AUTOMATED COUNT: 13.1 % (ref 11.5–14.5)
GFR SERPLBLD BASED ON 1.73 SQ M-ARVRAT: >60 ML/MIN/{1.73_M2}
GLOBULIN UR ELPH-MCNC: 2.6 G/DL (ref 2–3.5)
GLUCOSE SERPL-MCNC: 91 MG/DL (ref 65–99)
GRANS (ABSOLUTE): 3.87 10*3/UL (ref 2–8)
GRANS: 55 % (ref 30–85)
HBA1C MFR BLD: 12.8 G/DL (ref 12–16)
HCT VFR BLD AUTO: 39 % (ref 37–47)
HDLC SERPL-MCNC: 85 MG/DL (ref 40–60)
IMM GRANULOCYTES # BLD: 0.02 10*3/UL (ref 0–0.54)
IMM GRANULOCYTES NFR BLD: 0.3 % (ref 0–0.43)
LDLC SERPL CALC-MCNC: 106 MG/DL (ref 70–100)
LYMPHOCYTES # BLD MANUAL: 2.04 10*3/UL (ref 1–5)
LYMPHOCYTES NFR BLD MANUAL: 11.4 % (ref 3–10)
MCH RBC QN AUTO: 30.9 PG (ref 27–31)
MCHC RBC AUTO-ENTMCNC: 32.8 G/DL (ref 33–37)
MCV RBC AUTO: 94.2 FL (ref 81–99)
MONOCYTES # BLD AUTO: 0.8 10*3/UL (ref 0.2–1.2)
OSMOLALITY SERPL CALC.SUM OF ELEC: 289 MOSM/KG (ref 273–304)
PLATELET # BLD AUTO: 228 10*3/UL (ref 130–400)
PMV BLD AUTO: 10.3 FL (ref 7.4–10.4)
POTASSIUM SERPL-SCNC: 4.1 MMOL/L (ref 3.5–5.3)
RBC # BLD AUTO: 4.14 10*6/UL (ref 4.2–5.4)
SODIUM SERPL-SCNC: 140 MMOL/L (ref 135–147)
TRIGL SERPL-MCNC: 60 MG/DL (ref 40–150)
TSH SERPL-ACNC: 2.11 M[IU]/L (ref 0.27–4.2)
VARIANT LYMPHS NFR BLD MANUAL: 29 % (ref 20–45)
VLDLC SERPL-MCNC: 12 MG/DL (ref 5–37)
WBC # BLD AUTO: 7.03 10*3/UL (ref 4.8–10.8)

## 2019-02-21 ENCOUNTER — OFFICE VISIT (OUTPATIENT)
Dept: ORTHOPEDIC SURGERY | Facility: CLINIC | Age: 64
End: 2019-02-21

## 2019-02-21 DIAGNOSIS — E44.0 MODERATE MALNUTRITION (HCC): ICD-10-CM

## 2019-02-21 DIAGNOSIS — Z96.611 S/P REVERSE TOTAL SHOULDER ARTHROPLASTY, RIGHT: Primary | ICD-10-CM

## 2019-02-21 DIAGNOSIS — M75.122 COMPLETE TEAR OF LEFT ROTATOR CUFF: ICD-10-CM

## 2019-02-21 PROCEDURE — 99213 OFFICE O/P EST LOW 20 MIN: CPT | Performed by: ORTHOPAEDIC SURGERY

## 2019-03-12 ENCOUNTER — OFFICE VISIT CONVERTED (OUTPATIENT)
Dept: FAMILY MEDICINE CLINIC | Age: 64
End: 2019-03-12
Attending: FAMILY MEDICINE

## 2019-03-12 ENCOUNTER — HOSPITAL ENCOUNTER (OUTPATIENT)
Dept: OTHER | Facility: HOSPITAL | Age: 64
Discharge: HOME | End: 2019-03-12
Attending: FAMILY MEDICINE

## 2019-03-12 LAB
APPEARANCE UR: ABNORMAL
BACTERIA UR CULT: NORMAL
BACTERIA UR QL AUTO: ABNORMAL
BILIRUB UR QL: NEGATIVE
CASTS URNS QL MICRO: ABNORMAL /[LPF]
COLOR UR: YELLOW
CONV LEUKOCYTE ESTERASE: ABNORMAL
CONV UROBILINOGEN IN URINE BY AUTOMATED TEST STRIP: 0.2 {EHRLICHU}/DL (ref 0.1–1)
EPI CELLS #/AREA URNS HPF: ABNORMAL /[HPF]
GLUCOSE 24H UR-MCNC: NEGATIVE MG/DL
HGB UR QL STRIP: ABNORMAL
KETONES UR QL STRIP: NEGATIVE MG/DL
MUCOUS THREADS URNS QL MICRO: ABNORMAL
NITRITE UR-MCNC: POSITIVE MG/ML
PH UR STRIP.AUTO: 6 [PH] (ref 5–8)
PROT UR-MCNC: 30 MG/DL
RBC # BLD AUTO: ABNORMAL /[HPF]
SP GR UR STRIP: 1.02 (ref 1–1.03)
SPECIMEN SOURCE: ABNORMAL
UNIDENT CRYS URNS QL MICRO: ABNORMAL /[HPF]
WBC #/AREA URNS HPF: ABNORMAL /[HPF]

## 2019-03-15 LAB
AMOXICILLIN+CLAV SUSC ISLT: <=2
AMPICILLIN SUSC ISLT: 4
AMPICILLIN+SULBAC SUSC ISLT: <=2
BACTERIA UR CULT: ABNORMAL
CEFAZOLIN SUSC ISLT: <=4
CEFEPIME SUSC ISLT: <=1
CEFTAZIDIME SUSC ISLT: <=1
CEFTRIAXONE SUSC ISLT: <=1
CEFUROXIME ORAL SUSC ISLT: 4
CEFUROXIME PARENTER SUSC ISLT: 4
CIPROFLOXACIN SUSC ISLT: <=0.25
ERTAPENEM SUSC ISLT: <=0.5
GENTAMICIN SUSC ISLT: <=1
LEVOFLOXACIN SUSC ISLT: <=0.12
NITROFURANTOIN SUSC ISLT: <=16
TETRACYCLINE SUSC ISLT: <=1
TMP SMX SUSC ISLT: <=20
TOBRAMYCIN SUSC ISLT: <=1

## 2019-03-20 ENCOUNTER — HOSPITAL ENCOUNTER (OUTPATIENT)
Dept: OTHER | Facility: HOSPITAL | Age: 64
Discharge: HOME | End: 2019-03-20
Attending: FAMILY MEDICINE

## 2019-03-20 LAB
APPEARANCE UR: ABNORMAL
BACTERIA UR CULT: NORMAL
BACTERIA UR QL AUTO: ABNORMAL
CASTS URNS QL MICRO: ABNORMAL /[LPF]
COLOR UR: ABNORMAL
EPI CELLS #/AREA URNS HPF: ABNORMAL /[HPF]
MUCOUS THREADS URNS QL MICRO: ABNORMAL
RBC # BLD AUTO: ABNORMAL /[HPF]
SP GR UR STRIP: >=1.03 (ref 1–1.03)
SPECIMEN SOURCE: ABNORMAL
UNIDENT CRYS URNS QL MICRO: ABNORMAL /[HPF]
WBC #/AREA URNS HPF: ABNORMAL /[HPF]

## 2019-03-23 LAB
AMOXICILLIN+CLAV SUSC ISLT: <=2
AMPICILLIN SUSC ISLT: <=2
AMPICILLIN+SULBAC SUSC ISLT: <=2
BACTERIA UR CULT: ABNORMAL
CEFAZOLIN SUSC ISLT: <=4
CEFEPIME SUSC ISLT: <=1
CEFTAZIDIME SUSC ISLT: <=1
CEFTRIAXONE SUSC ISLT: <=1
CEFUROXIME ORAL SUSC ISLT: 4
CEFUROXIME PARENTER SUSC ISLT: 4
CIPROFLOXACIN SUSC ISLT: <=0.25
ERTAPENEM SUSC ISLT: <=0.5
GENTAMICIN SUSC ISLT: <=1
LEVOFLOXACIN SUSC ISLT: <=0.12
NITROFURANTOIN SUSC ISLT: <=16
TETRACYCLINE SUSC ISLT: <=1
TMP SMX SUSC ISLT: <=20
TOBRAMYCIN SUSC ISLT: <=1

## 2019-04-11 ENCOUNTER — OFFICE VISIT CONVERTED (OUTPATIENT)
Dept: FAMILY MEDICINE CLINIC | Age: 64
End: 2019-04-11
Attending: NURSE PRACTITIONER

## 2019-04-11 ENCOUNTER — HOSPITAL ENCOUNTER (OUTPATIENT)
Dept: OTHER | Facility: HOSPITAL | Age: 64
Discharge: HOME | End: 2019-04-11
Attending: NURSE PRACTITIONER

## 2019-04-18 ENCOUNTER — HOSPITAL ENCOUNTER (OUTPATIENT)
Dept: OTHER | Facility: HOSPITAL | Age: 64
Discharge: HOME | End: 2019-04-18
Attending: FAMILY MEDICINE

## 2019-04-19 ENCOUNTER — OFFICE VISIT CONVERTED (OUTPATIENT)
Dept: FAMILY MEDICINE CLINIC | Age: 64
End: 2019-04-19
Attending: FAMILY MEDICINE

## 2019-04-22 LAB — BACTERIA SPEC AEROBE CULT: NORMAL

## 2019-06-27 ENCOUNTER — HOSPITAL ENCOUNTER (OUTPATIENT)
Dept: OTHER | Facility: HOSPITAL | Age: 64
Discharge: HOME | End: 2019-06-27

## 2019-06-27 ENCOUNTER — OFFICE VISIT CONVERTED (OUTPATIENT)
Dept: FAMILY MEDICINE CLINIC | Age: 64
End: 2019-06-27
Attending: NURSE PRACTITIONER

## 2019-06-27 LAB
APPEARANCE UR: CLEAR
BACTERIA UR QL AUTO: NORMAL
BILIRUB UR QL: NEGATIVE
CASTS URNS QL MICRO: NORMAL /[LPF]
COLOR UR: YELLOW
CONV LEUKOCYTE ESTERASE: NEGATIVE
CONV UROBILINOGEN IN URINE BY AUTOMATED TEST STRIP: 0.2 {EHRLICHU}/DL (ref 0.1–1)
EPI CELLS #/AREA URNS HPF: NORMAL /[HPF]
GLUCOSE 24H UR-MCNC: NEGATIVE MG/DL
HGB UR QL STRIP: NEGATIVE
KETONES UR QL STRIP: NEGATIVE MG/DL
MUCOUS THREADS URNS QL MICRO: NORMAL
NITRITE UR-MCNC: NEGATIVE MG/ML
PH UR STRIP.AUTO: 6.5 [PH] (ref 5–8)
PROT UR-MCNC: NEGATIVE MG/DL
RBC # BLD AUTO: NORMAL /[HPF]
SP GR UR STRIP: 1.02 (ref 1–1.03)
SPECIMEN SOURCE: NORMAL
UNIDENT CRYS URNS QL MICRO: NORMAL /[HPF]
WBC #/AREA URNS HPF: NORMAL /[HPF]

## 2019-06-29 LAB — BACTERIA UR CULT: NORMAL

## 2019-08-22 ENCOUNTER — OFFICE VISIT (OUTPATIENT)
Dept: ORTHOPEDIC SURGERY | Facility: CLINIC | Age: 64
End: 2019-08-22

## 2019-08-22 DIAGNOSIS — M25.551 RIGHT HIP PAIN: Primary | ICD-10-CM

## 2019-08-22 DIAGNOSIS — M25.571 SINUS TARSI SYNDROME OF RIGHT ANKLE: ICD-10-CM

## 2019-08-22 PROCEDURE — 73502 X-RAY EXAM HIP UNI 2-3 VIEWS: CPT | Performed by: PHYSICIAN ASSISTANT

## 2019-08-22 PROCEDURE — 20605 DRAIN/INJ JOINT/BURSA W/O US: CPT | Performed by: PHYSICIAN ASSISTANT

## 2019-08-22 PROCEDURE — 99213 OFFICE O/P EST LOW 20 MIN: CPT | Performed by: PHYSICIAN ASSISTANT

## 2019-08-22 RX ORDER — MELOXICAM 7.5 MG/1
TABLET ORAL
Qty: 40 TABLET | Refills: 3 | Status: SHIPPED | OUTPATIENT
Start: 2019-08-22 | End: 2020-06-24 | Stop reason: HOSPADM

## 2019-08-22 RX ADMIN — LIDOCAINE HYDROCHLORIDE 1 ML: 10 INJECTION, SOLUTION EPIDURAL; INFILTRATION; INTRACAUDAL; PERINEURAL at 13:44

## 2019-08-22 RX ADMIN — METHYLPREDNISOLONE ACETATE 80 MG: 80 INJECTION, SUSPENSION INTRA-ARTICULAR; INTRALESIONAL; INTRAMUSCULAR; SOFT TISSUE at 13:44

## 2019-08-22 NOTE — PROGRESS NOTES
NEW VISIT    Patient: Richelle Morelos  ?  YOB: 1955    MRN: [id number]  ?  Chief Complaint   Patient presents with   • Right Hip - Establish Care   • Right Ankle - Establish Care, Pain      ?  HPI: Is been having pain and discomfort on the lateral aspect of the right ankle.  This pain is centered over the sinus Tarsi region.  She denies any recent history of trauma.  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  The patient also has pain and discomfort posteriorly over the right SI joint.  And tingling associated with the pain and discomfort.  The patient states that she does not have an objective neurological deficit associated with the SI joint pain.    Pain Location: right hip(s) and right ankle  Radiation: none  Quality: dull, aching  Intensity/Severity: moderate  Duration: 2 year(s)  Onset quality: gradual   Timing: intermittent  Aggravating Factors: going up and down stairs, rising after sitting, squatting  Alleviating Factors: none , NSAID's  Previous Episodes: yes  Associated Symptoms: pain, swelling, decreased ROM, decreased strength  ADLs Affected: ambulating, work related activities  Previous Treatment: Anti-inflammatory medication.    This patient is an established patient.  This problem is new to this examiner.      Allergies:   Allergies   Allergen Reactions   • Morphine And Related Nausea Only and Palpitations       Medications:   Home Medications:  Current Outpatient Medications on File Prior to Visit   Medication Sig   • diazePAM (VALIUM) 5 MG tablet Take 2.5-5 mg by mouth 2 (Two) Times a Day As Needed for Anxiety.   • levothyroxine (SYNTHROID, LEVOTHROID) 75 MCG tablet Take 75 mcg by mouth Daily.     No current facility-administered medications on file prior to visit.      Current Medications:  Scheduled Meds:  PRN Meds:.    I have reviewed the patient's medical history in detail and updated the computerized patient record.  Review and summarization  of old records include:    Past Medical History:   Diagnosis Date   • Anxiety    • Arthritis     OSTEO   • History of headache    • Hypothyroidism    • PONV (postoperative nausea and vomiting)    • Right shoulder pain    • Slow to wake up after anesthesia    • Vision loss     RIGHT EYE     Past Surgical History:   Procedure Laterality Date   • EYE SURGERY Right     SEVERAL TIME FOR HEMORRHAGE   • HYSTERECTOMY      ABD WITH CAILIN S AND O   • KNEE ARTHROPLASTY Left    • LAPAROSCOPIC CHOLECYSTECTOMY     • REFRACTIVE SURGERY Bilateral    • TEETH EXTRACTION     • TOTAL SHOULDER ARTHROPLASTY W/ DISTAL CLAVICLE EXCISION Right 7/10/2018    Procedure: TOTAL SHOULDER REVERSE ARTHROPLASTY;  Surgeon: Naveen Michaud MD;  Location: [facility];  Service: Orthopedics     Social History     Occupational History   • Not on file   Tobacco Use   • Smoking status: Former Smoker     Packs/day: 0.50     Years: 0.50     Pack years: 0.25     Types: Cigarettes   • Smokeless tobacco: Never Used   • Tobacco comment: 1/2 PPD QUIT 47 YR AGO.    Substance and Sexual Activity   • Alcohol use: No   • Drug use: No   • Sexual activity: Not on file      Social History     Social History Narrative   • Not on file     Family History   Problem Relation Age of Onset   • Malig Hyperthermia Neg Hx          Review of Systems   Constitutional: Negative.  Negative for fever.   HENT: Negative.    Eyes: Negative.    Respiratory: Negative.    Cardiovascular: Negative.    Gastrointestinal: Negative.    Endocrine: Negative.    Genitourinary: Negative.    Musculoskeletal: Positive for arthralgias, gait problem and joint swelling.   Skin: Negative.  Negative for rash and wound.   Allergic/Immunologic: Negative.    Neurological: Negative for numbness.   Hematological: Negative.    Psychiatric/Behavioral: Negative.           Wt Readings from Last 3 Encounters:   11/20/18 43.1 kg (95 lb)   10/16/18 43.1 kg (95 lb)   07/10/18 42.6 kg (93 lb 14.7 oz)     Ht Readings from  "Last 3 Encounters:   11/20/18 157.5 cm (62\")   10/16/18 157.5 cm (62\")   07/10/18 157.5 cm (62.01\")     There is no height or weight on file to calculate BMI.  No height and weight on file for this encounter.  There were no vitals filed for this visit.      Physical Exam  Constitutional: Patient is oriented to person, place, and time. Appears well-developed and well-nourished.   HENT:   Head: Normocephalic and atraumatic.   Eyes: Conjunctivae and EOM are normal. Pupils are equal, round, and reactive to light.   Cardiovascular: Normal rate, regular rhythm, normal heart sounds and intact distal pulses.   Pulmonary/Chest: Effort normal and breath sounds normal.   Musculoskeletal:   See detailed exam below   Neurological: Alert and oriented to person, place, and time. No sensory deficit. Coordination normal.   Skin: Skin is warm and dry. Capillary refill takes less than 2 seconds. No rash noted. No erythema.   Psychiatric: Patient has a normal mood and affect. Her behavior is normal. Judgment and thought content normal.   Nursing note and vitals reviewed.      Ortho Exam:   Right SI JOINT: Mild tenderness is present dorsally over the SI joint. Figure of 4 sign is positive. There is mild spasm present in the erector spinae muscle. Flexion and extension are associated with discomfort. Deep tendon reflexes are intact and symmetrical on both lower extremities. Pain radiates into the buttock on extension of the hip. No evidence of cauda equina syndrome. No motor or sensory deficit is noted. There is no bowel or bladder involvement.  Right ankle/foot-sinus tarsi. The patient has restriction of dorsiflexion and plantarflexion. There is a boggy swelling on the anterolateral aspect of the sinus tarsi. Eversion against resistance bothers the patient significantly. Capillary refill is 2 seconds with a brisk return.  Dorsiflexion is 0-20 degrees, plantarflexion 0-30 degrees. Patient has a difficult time circumducting the ankle. " There is some diffuse ill-defined tenderness over the anterior joint line of the tibiotalar articulation as well.     Right shoulder. Patient is postoperative Several month(s). Afebrile. Vital signs are stable. Deltopectoral incision is clean and healing well. Axillary nerve function is well preserved. There is no glenohumeral instability. No clicking, popping or catching is noted. Apprehension sign is negative. Axillary nerve function is well preserved. Radial artery pulses are palpable. There is no clinical deformity. Range of motion is flexion 0-140 degrees, abduction 0-140 degrees.        Diagnostics:  xrays obtained today  right Hip X-Ray  Indication: pain And discomfort over the right hip.  AP and lateral  Findings: Early degenerative osteoarthritis of the right hip.  no bony lesion  Soft tissues within normal limits  decreased joint spaces  Hardware appropriately positioned not applicable      no prior studies available for comparison.    X-RAY was ordered and reviewed by Naveen Michaud MD        Assessment:  Richelle was seen today for establish care, establish care and pain.    Diagnoses and all orders for this visit:    Right hip pain  -     XR Hip With or Without Pelvis 2 - 3 View Right  -     meloxicam (MOBIC) 7.5 MG tablet; 1 Oral Daily with food.    Sinus tarsi syndrome of right ankle  -     Medium Joint Arthrocentesis: R ankle    Other orders  -     Cancel: Small Joint Arthrocentesis          Medium Joint Arthrocentesis: R ankle  Date/Time: 8/22/2019 1:44 PM  Consent given by: patient  Site marked: site marked  Timeout: Immediately prior to procedure a time out was called to verify the correct patient, procedure, equipment, support staff and site/side marked as required   Supporting Documentation  Indications: pain   Procedure Details  Location: ankle - R ankle (sinus tarsi)  Preparation: Patient was prepped and draped in the usual sterile fashion  Needle gauge: 27G.  Approach: anterolateral  Medications  administered: 1 mL lidocaine PF 1% 1 %; 80 mg methylPREDNISolone acetate 80 MG/ML  Patient tolerance: patient tolerated the procedure well with no immediate complications      Injection(s) above administered by Sukhdeep Durant PA-C.    ?    Plan    · Discussion of x-ray findings in combination with physical exam and appropriate treatment options.   · Cortisone injection discussed  · Physical Therapy discussed and ordered focusing on a [place] exercise program.  · Medication options discussed and recommended.  · Injected patient's right ankle-sinus tarsi joint(s)with Steroid from a(n) anterolateral approach.  Patient tolerated the procedure well and no complications were encountered.  · Rest, ice, compression, and elevation (RICE) therapy tablet Mobic 7.5 mg tab 1 p.o. nightly for pain swelling and discomfort.  · Stretching and strengthening exercises of the ankle flexors and extensors.  · Tylenol 500-1000mg by mouth every 6 hours as needed for pain   · Follow up in 3 month(s)    Date of encounter: 08/22/2019   Naveen Michaud MD

## 2019-08-31 PROBLEM — M25.551 RIGHT HIP PAIN: Status: ACTIVE | Noted: 2019-08-31

## 2019-08-31 PROBLEM — M25.571 SINUS TARSI SYNDROME OF RIGHT ANKLE: Status: ACTIVE | Noted: 2019-08-31

## 2019-08-31 RX ORDER — LIDOCAINE HYDROCHLORIDE 10 MG/ML
1 INJECTION, SOLUTION EPIDURAL; INFILTRATION; INTRACAUDAL; PERINEURAL
Status: COMPLETED | OUTPATIENT
Start: 2019-08-22 | End: 2019-08-22

## 2019-08-31 RX ORDER — METHYLPREDNISOLONE ACETATE 80 MG/ML
80 INJECTION, SUSPENSION INTRA-ARTICULAR; INTRALESIONAL; INTRAMUSCULAR; SOFT TISSUE
Status: COMPLETED | OUTPATIENT
Start: 2019-08-22 | End: 2019-08-22

## 2019-09-10 ENCOUNTER — OFFICE VISIT CONVERTED (OUTPATIENT)
Dept: FAMILY MEDICINE CLINIC | Age: 64
End: 2019-09-10
Attending: FAMILY MEDICINE

## 2019-09-11 ENCOUNTER — HOSPITAL ENCOUNTER (OUTPATIENT)
Dept: OTHER | Facility: HOSPITAL | Age: 64
Discharge: HOME | End: 2019-09-11
Attending: FAMILY MEDICINE

## 2019-09-11 LAB
25(OH)D3 SERPL-MCNC: 75.9 NG/ML (ref 30–100)
ALBUMIN SERPL-MCNC: 4.2 G/DL (ref 3.5–5)
ALBUMIN/GLOB SERPL: 1.6 {RATIO} (ref 1.4–2.6)
ALP SERPL-CCNC: 56 U/L (ref 43–160)
ALT SERPL-CCNC: 25 U/L (ref 10–40)
ANION GAP SERPL CALC-SCNC: 14 MMOL/L (ref 8–19)
AST SERPL-CCNC: 31 U/L (ref 15–50)
BILIRUB SERPL-MCNC: 0.52 MG/DL (ref 0.2–1.3)
BUN SERPL-MCNC: 14 MG/DL (ref 5–25)
BUN/CREAT SERPL: 25 {RATIO} (ref 6–20)
CALCIUM SERPL-MCNC: 9 MG/DL (ref 8.7–10.4)
CHLORIDE SERPL-SCNC: 100 MMOL/L (ref 99–111)
CHOLEST SERPL-MCNC: 175 MG/DL (ref 107–200)
CHOLEST/HDLC SERPL: 2.3 {RATIO} (ref 3–6)
CONV CO2: 27 MMOL/L (ref 22–32)
CONV TOTAL PROTEIN: 6.9 G/DL (ref 6.3–8.2)
CREAT UR-MCNC: 0.57 MG/DL (ref 0.5–0.9)
GFR SERPLBLD BASED ON 1.73 SQ M-ARVRAT: >60 ML/MIN/{1.73_M2}
GLOBULIN UR ELPH-MCNC: 2.7 G/DL (ref 2–3.5)
GLUCOSE SERPL-MCNC: 85 MG/DL (ref 65–99)
HDLC SERPL-MCNC: 76 MG/DL (ref 40–60)
LDLC SERPL CALC-MCNC: 84 MG/DL (ref 70–100)
OSMOLALITY SERPL CALC.SUM OF ELEC: 284 MOSM/KG (ref 273–304)
POTASSIUM SERPL-SCNC: 4.2 MMOL/L (ref 3.5–5.3)
SODIUM SERPL-SCNC: 137 MMOL/L (ref 135–147)
TRIGL SERPL-MCNC: 75 MG/DL (ref 40–150)
TSH SERPL-ACNC: 3.35 M[IU]/L (ref 0.27–4.2)
VLDLC SERPL-MCNC: 15 MG/DL (ref 5–37)

## 2019-09-13 LAB
ALMOND IGE QN: <0.1 K[IU]/ML (ref 0–0.35)
CASHEW NUT IGE QN: <0.1 K[IU]/ML
CLAM IGE QN: <0.1 K[IU]/ML (ref 0–0.35)
CODFISH IGE QN: <0.1 K[IU]/ML (ref 0–0.35)
CONV IMMUNOCAP WALNUT (FOOD) IGE: <0.1 K[IU]/ML (ref 0–0.35)
CORN IGE QN: <0.1 K[IU]/ML (ref 0–0.35)
COW MILK IGE QN: <0.1 K[IU]/ML (ref 0–0.35)
EGG WHITE IGE QN: <0.1 K[IU]/ML (ref 0–0.35)
HAZELNUT IGE QN: <0.1 K[IU]/ML
IGE SERPL-ACNC: 27 K[IU]/ML (ref 0–24)
IMMUNOCAP RESULT: ABNORMAL (ref 0–0)
PEANUT IGE QN: <0.1 K[IU]/ML (ref 0–0.35)
SALMON IGE QN: <0.1 K[IU]/ML
SCALLOP IGE QN: <0.1 K[IU]/ML
SESAME SEED IGE: <0.1 K[IU]/ML
SHRIMP IGE: <0.1 K[IU]/ML (ref 0–0.35)
SOYBEAN IGE QN: <0.1 K[IU]/ML (ref 0–0.35)
TUNA IGE QN: <0.1 K[IU]/ML
WHEAT IGE QN: <0.1 K[IU]/ML (ref 0–0.35)

## 2019-09-15 LAB — GLIADIN PEPTIDE IGG SER-ACNC: 3 UNITS (ref 0–19)

## 2019-11-21 ENCOUNTER — OFFICE VISIT (OUTPATIENT)
Dept: ORTHOPEDIC SURGERY | Facility: CLINIC | Age: 64
End: 2019-11-21

## 2019-11-21 VITALS — HEIGHT: 62 IN | BODY MASS INDEX: 17.89 KG/M2 | TEMPERATURE: 98.7 F | WEIGHT: 97.2 LBS

## 2019-11-21 DIAGNOSIS — M25.571 SINUS TARSI SYNDROME OF RIGHT ANKLE: Primary | ICD-10-CM

## 2019-11-21 DIAGNOSIS — M25.551 RIGHT HIP PAIN: ICD-10-CM

## 2019-11-21 PROCEDURE — 99213 OFFICE O/P EST LOW 20 MIN: CPT | Performed by: ORTHOPAEDIC SURGERY

## 2019-12-04 ENCOUNTER — TELEPHONE (OUTPATIENT)
Dept: ORTHOPEDIC SURGERY | Facility: CLINIC | Age: 64
End: 2019-12-04

## 2019-12-16 ENCOUNTER — OFFICE VISIT (OUTPATIENT)
Dept: ORTHOPEDIC SURGERY | Facility: CLINIC | Age: 64
End: 2019-12-16

## 2019-12-16 VITALS — BODY MASS INDEX: 18.03 KG/M2 | WEIGHT: 98 LBS | HEIGHT: 62 IN

## 2019-12-16 DIAGNOSIS — M25.561 RIGHT KNEE PAIN, UNSPECIFIED CHRONICITY: ICD-10-CM

## 2019-12-16 DIAGNOSIS — G89.29 CHRONIC PAIN OF RIGHT ANKLE: ICD-10-CM

## 2019-12-16 DIAGNOSIS — M21.371 FOOT DROP, RIGHT FOOT: Primary | ICD-10-CM

## 2019-12-16 DIAGNOSIS — M25.571 CHRONIC PAIN OF RIGHT ANKLE: ICD-10-CM

## 2019-12-16 PROCEDURE — 99215 OFFICE O/P EST HI 40 MIN: CPT | Performed by: PHYSICIAN ASSISTANT

## 2019-12-16 PROCEDURE — 72100 X-RAY EXAM L-S SPINE 2/3 VWS: CPT | Performed by: PHYSICIAN ASSISTANT

## 2019-12-16 PROCEDURE — 73560 X-RAY EXAM OF KNEE 1 OR 2: CPT | Performed by: PHYSICIAN ASSISTANT

## 2019-12-16 RX ORDER — MIRTAZAPINE 15 MG/1
15 TABLET, FILM COATED ORAL
Refills: 0 | COMMUNITY
Start: 2019-09-10 | End: 2020-06-24 | Stop reason: HOSPADM

## 2019-12-16 RX ORDER — OMEPRAZOLE 40 MG/1
40 CAPSULE, DELAYED RELEASE ORAL DAILY
Refills: 0 | COMMUNITY
Start: 2019-09-10

## 2019-12-16 RX ORDER — FLUTICASONE PROPIONATE 50 MCG
1 SPRAY, SUSPENSION (ML) NASAL DAILY PRN
Refills: 0 | COMMUNITY
Start: 2019-09-10

## 2019-12-16 RX ORDER — CHOLECALCIFEROL (VITAMIN D3) 125 MCG
10000 CAPSULE ORAL DAILY
Refills: 3 | COMMUNITY
Start: 2019-09-10

## 2019-12-18 PROBLEM — G89.29 CHRONIC PAIN OF RIGHT ANKLE: Status: ACTIVE | Noted: 2019-12-18

## 2019-12-18 PROBLEM — M25.561 RIGHT KNEE PAIN: Status: ACTIVE | Noted: 2019-12-18

## 2019-12-18 PROBLEM — M21.371 FOOT DROP, RIGHT FOOT: Status: ACTIVE | Noted: 2019-12-18

## 2019-12-18 PROBLEM — M25.571 CHRONIC PAIN OF RIGHT ANKLE: Status: ACTIVE | Noted: 2019-12-18

## 2019-12-18 RX ORDER — CALCIUM CARBONATE 160(400)MG
TABLET,CHEWABLE ORAL
Qty: 1 EACH | Refills: 0 | Status: SHIPPED | OUTPATIENT
Start: 2019-12-18 | End: 2019-12-19 | Stop reason: SDUPTHER

## 2019-12-18 NOTE — PROGRESS NOTES
"FOLLOW UP VISIT    Patient: Richelle Morelos  ?  YOB: 1955    MRN: [id number]  ?  Chief Complaint   Patient presents with   • Right Knee - Pain   • Right Ankle - Results, Pain      ?  HPI:   Ms. Morelos is a 64-year-old female who presents for follow-up on right ankle pain and MRI results as well as a new complaint of right knee pain.  Dr. Michaud has seen her on a previous visit for the right ankle and ordered an MRI.  She is accompanied today by her daughter who provides vital information to what the patient is experiencing at home.  Her daughter reports \" she is unable to dorsiflex her right foot.\" she reports her symptoms started as a gradual onset around mid May and have gradually worsened over the last several months.  By August 2019 patient was using a walker to assist with her ambulation.  She reports they bought the Rollator at yard sale and would like to get a prescription for a new one.  The same time patient reports difficulty with certain movements of the foot.  Her main complaint in regards to the ankle is that it occasionally gives out from underneath of her causing her to fall.  She denies any specific injury to the ankle.  The majority of her ankle discomfort is located at the lateral aspect of the ankle and foot and is described as an ache.  She has been wearing an active ankle brace for stability but states it has not helped her discomfort or her decreased range of motion in the foot.  Severity of symptoms described as moderately severe.  She also casually reports that she has been unable to gain weight and continues to lose weight without trying.  She is currently seeing her endocrinologist for this issue.  She states her physician has advised her to decrease the frequency in which she takes her thyroid medication.    She also reports a new complaint of right knee pain that is been present for approximately the same amount of time as the ankle pain.  Again, she denies any injury to the " right leg or her low back.  She also reports pain in the right lateral hip that seems to radiate into the knee.  The symptoms have also been gradual in onset.  She denies any paresthesias of the right leg.  She denies any bladder or bowel dysfunction.  There is nothing known to resolve her symptoms although over-the-counter medication does seem to alleviate the discomfort slightly.  She reports that any weightbearing onto the right leg is painful.  Her symptoms are rated as moderately severe.    This patient is an established patient.  This problem is new to this examiner.      Allergies:   Allergies   Allergen Reactions   • Morphine And Related Nausea Only and Palpitations       Medications:   Home Medications:  Current Outpatient Medications on File Prior to Visit   Medication Sig   • D-3-5 125 MCG (5000 UT) capsule Take 10,000 Units by mouth Daily.   • diazePAM (VALIUM) 5 MG tablet Take 2.5-5 mg by mouth 2 (Two) Times a Day As Needed for Anxiety.   • fluticasone (FLONASE) 50 MCG/ACT nasal spray 1 spray by Each Nare route Daily.   • levothyroxine (SYNTHROID, LEVOTHROID) 75 MCG tablet Take 75 mcg by mouth Daily.   • meloxicam (MOBIC) 7.5 MG tablet 1 Oral Daily with food.   • mirtazapine (REMERON) 15 MG tablet Take 15 mg by mouth every night at bedtime.   • omeprazole (priLOSEC) 40 MG capsule Take 40 mg by mouth Daily.     No current facility-administered medications on file prior to visit.      Current Medications:  Scheduled Meds:  PRN Meds:.    I have reviewed the patient's medical history in detail and updated the computerized patient record.  Review and summarization of old records include:    Past Medical History:   Diagnosis Date   • Anxiety    • Arthritis     OSTEO   • History of headache    • Hypothyroidism    • PONV (postoperative nausea and vomiting)    • Right shoulder pain    • Slow to wake up after anesthesia    • Vision loss     RIGHT EYE     Past Surgical History:   Procedure Laterality Date   • EYE  "SURGERY Right     SEVERAL TIME FOR HEMORRHAGE   • HYSTERECTOMY      ABD WITH CAILIN S AND O   • KNEE ARTHROPLASTY Left    • LAPAROSCOPIC CHOLECYSTECTOMY     • REFRACTIVE SURGERY Bilateral    • TEETH EXTRACTION     • TOTAL SHOULDER ARTHROPLASTY W/ DISTAL CLAVICLE EXCISION Right 7/10/2018    Procedure: TOTAL SHOULDER REVERSE ARTHROPLASTY;  Surgeon: Naveen Michaud MD;  Location: [facility];  Service: Orthopedics     Social History     Occupational History   • Not on file   Tobacco Use   • Smoking status: Former Smoker     Packs/day: 0.50     Years: 0.50     Pack years: 0.25     Types: Cigarettes   • Smokeless tobacco: Never Used   • Tobacco comment: 1/2 PPD QUIT 47 YR AGO.    Substance and Sexual Activity   • Alcohol use: No   • Drug use: No   • Sexual activity: Not on file      Social History     Social History Narrative   • Not on file     Family History   Problem Relation Age of Onset   • Malig Hyperthermia Neg Hx          Review of Systems  Constitutional: Negative for fever. Positive for weight loss.   Eyes: Negative.    Respiratory: Negative.    Cardiovascular: Negative.    Endocrine:  Negative for palpations, cold intolerance or heat intolerance.  Musculoskeletal: Positive for joint pain, gait problem and joint swelling.   Skin: Negative.  Negative for rash and wound.   Allergic/Immunologic: Negative.    Neurological: Negative for reported numbness. Positive for difficulty with lifting foot from the ground.  Hematological: Negative.    Psychiatric/Behavioral: Negative.           Wt Readings from Last 3 Encounters:   12/16/19 44.5 kg (98 lb)   11/21/19 44.1 kg (97 lb 3.2 oz)   11/20/18 43.1 kg (95 lb)     Ht Readings from Last 3 Encounters:   12/16/19 157.5 cm (62\")   11/21/19 157.5 cm (62\")   11/20/18 157.5 cm (62\")     Body mass index is 17.92 kg/m².  Facility age limit for growth percentiles is 20 years.  There were no vitals filed for this visit.      Physical Exam  Constitutional: Patient is oriented to " person, place, and time. Appears well-developed and well-nourished.   HENT:   Head: Normocephalic and atraumatic.   Eyes: Conjunctivae and EOM are normal. Pupils are equal, round, and reactive to light.   Cardiovascular: Normal rate and intact distal pulses.   Pulmonary/Chest: Effort normal and breath sounds normal.   Musculoskeletal:   See detailed exam below   Neurological: Alert and oriented to person, place, and time.  Possible mild sensory deficit at the right lateral aspect of the foot.  Unable to dorsiflex or plantar flex right foot.  Unable to lift great toe from the floor.  Skin: Skin is warm and dry. Capillary refill takes less than 2 seconds. No rash noted. No erythema.   Psychiatric: Patient has a normal mood and affect. Her behavior is normal. Judgment and thought content normal.   Nursing note and vitals reviewed.      Ortho Exam:   Right Ankle/Right knee:   Patient has diffuse ill-defined tenderness with minimal swelling over the lateral aspect of the ankle at the peroneus brevis tendon. Inversion and eversion against resistance are nearly impossible for patient to perform. She is unable to plantarflex or dorsiflex the right foot and ankle. Dorsalis pedis and posterior tibial artery pulses are palpable.  There is no evidence of a proximal fibular injury. Distal tibiofibular syndesmotic ligament appears to be intact.Squeeze test is negative.  She is slightly tender over the lateral aspect of the knee. Calf is soft and nontender. Homans sign is negative. There is no clinical deformity. No rotatory malalignment.  Fibular tenderness is absent. Dorsalis pedis and posterior tibial artery pulses are palpable. Gait is cautious with presence of foot drop. ROM: 0-110 of flexion and extension.      Lumbar Spine:   The overlying skin and soft tissues are within normal limits and without tenderness upon palpation.  There is no evidence of bowel or bladder deficit. Straight leg raise test is negative. Contralateral  straight leg raise is negative.  There is no evidence of back pain although there is great concern that she is experiencing radiculopathy from the lumbar spine or more distal in the right leg.  There is significant concern for peroneal nerve damage due to the presence of foot drop in right right foot. She is unable to dorsiflex or plantar flex, she is unable to lift her great toe from the floor.        Diagnostics:  X-Ray Report:  Right knee X-Ray  Indication: Evaluation of right knee pain  AP, Lateral views  Findings: Overall well-preserved joint space with mild medial joint space narrowing.  Bony lesion: no  Soft tissues: within normal limits  Joint spaces: subnormal  Hardware appropriately positioned: not applicable  Prior studies available for comparison: no   X-RAY was ordered and reviewed by Sukhdeep Durant PA-C    This patient's x-ray report was graded according to the Kellgren and Brant classification.  This took into account the joint space narrowing, osteophyte formation, sclerosis of the distal femur/proximal tibia along with deformity of those bones.  The findings were indicative of K L grade 1.      X-Ray Report:  L-Spine X-Ray  Indication: Evaluation of lumbar spine secondary to right sided foot drop  AP, Lateral views  Findings: Suboptimal lateral view.  Findings reveal mild to moderate osteoarthritis throughout the lumbar spine with no evidence of compression fracture.  Bony lesion: no  Soft tissues: within normal limits  Joint spaces: within normal limits  Hardware appropriately positioned: not applicable  Prior studies available for comparison: no   X-RAY was ordered and reviewed by Sukhdeep Durant PA-C    Review of right ankle MRI results, performed 12/5/2019 at [facility], summary of impression below:  · Mild abnormal signal within the peroneus brevis tendon indicating tendinosis  · Anterior and posterior talofibular ligaments within normal  limits       Assessment:  Richelle was seen today for pain, results and pain.    Diagnoses and all orders for this visit:    Foot drop, right foot  -     XR Spine Lumbar 2 or 3 View  -     EMG & Nerve Conduction Test; Future  -     Discontinue: Misc. Devices (ROLLATOR ULTRA-LIGHT) misc; Use rollator to assist with basic day to day ambulation around her home and out in public. This will be used to help steady the balance of t  -     Misc. Devices (ROLLATOR ULTRA-LIGHT) misc; Use rollator to assist with basic day to day ambulation around her home and out in public. Used to help steady the balance of the patient.    Right knee pain, unspecified chronicity  -     XR Knee 1 or 2 View Right  -     EMG & Nerve Conduction Test; Future  -     Discontinue: Misc. Devices (ROLLATOR ULTRA-LIGHT) misc; Use rollator to assist with basic day to day ambulation around her home and out in public. This will be used to help steady the balance of t  -     Misc. Devices (ROLLATOR ULTRA-LIGHT) misc; Use rollator to assist with basic day to day ambulation around her home and out in public. Used to help steady the balance of the patient.    Chronic pain of right ankle      ?    Plan    · There was a detailed discussion between the patient, her daughter and myself regarding her current symptoms, physical exam findings and imaging results.  Discussed that although MRI shows mild tendinosis of the Peroneus brevis tendon this is not the underlying cause of her symptoms.  We discussed the likelihood of injury to or problem with her peroneal nerve given her lack of ability to dorsiflex and plantarflex.  Discussed that her symptoms can be originating from the lumbar spine or more distal in the extremity.  Another concern is the possibility of a lesion that could be causing her foot drop as well as decrease in body weight.  Given that there has been an abrupt change in her motor function and the presence of foot drop, it is vital that we move quickly  in our work-up of this problem.  I have discussed with patient and her daughter the significance and high severity of an injury to the peroneal nerve and that depending on the extent of injury or damage, it could be irreversible.  · In order to fully evaluate the extent of patient's complaint and symptoms I will proceed with ordering an EMG/NCS of the right lower extremity as well as an MRI of the lumbar spine.  · Continue with use of walker to assist with ambulation.  I will also send a prescription for a new Rollator to [facility].  · Follow up once further testing has been completed  · I will also make a referral to neurology    Date of encounter: 12/16/2019   Sukhdeep Durant PA-C    Electronically signed by Sukhdeep Durant PA-C, 12/18/19, 7:43 AM.

## 2019-12-19 ENCOUNTER — TELEPHONE (OUTPATIENT)
Dept: ORTHOPEDIC SURGERY | Facility: CLINIC | Age: 64
End: 2019-12-19

## 2019-12-19 DIAGNOSIS — M25.561 RIGHT KNEE PAIN, UNSPECIFIED CHRONICITY: ICD-10-CM

## 2019-12-19 DIAGNOSIS — M54.50 LUMBAR PAIN: Primary | ICD-10-CM

## 2019-12-19 DIAGNOSIS — M21.371 FOOT DROP, RIGHT FOOT: ICD-10-CM

## 2019-12-19 RX ORDER — CALCIUM CARBONATE 160(400)MG
TABLET,CHEWABLE ORAL
Qty: 1 EACH | Refills: 0 | Status: SHIPPED | OUTPATIENT
Start: 2019-12-19

## 2019-12-19 NOTE — TELEPHONE ENCOUNTER
Order was placed yesterday, must have not printed out since it still had normal on the script I believe it was sent to [facility] by mistake.     I have faxed this to [facility] in [place].

## 2019-12-19 NOTE — TELEPHONE ENCOUNTER
PATIENT CALLED AND WANTED TO KNOW WHY HER L-SPINE MRI HAD NOT BEEN SCHEDULED YET. I EXPLAINED THAT THE ORDER HAD NOT BEEN PLACED YET BUT I DID SEE ON FACESHEET THAT YOU WANTED AN MRI OF LUMBAR SPINE AS WELL AS EMG. I WILL GET THESE SCHEDULED TODAY. DID YOU WANT TO CALL THE PATIENT WITH RESULTS OR HAVE HER COME BACK IN? PLEASE ADVISE/RJ

## 2019-12-19 NOTE — TELEPHONE ENCOUNTER
PATIENT CALLED AND STATED THAT [facility] HASN'T RECEIVED THE PRESCRIPTION FOR A ROLLATOR WALKER.  PLEASE FAX ORDER TO [facility]

## 2019-12-20 RX ORDER — CALCIUM CARBONATE 160(400)MG
TABLET,CHEWABLE ORAL
Qty: 1 EACH | Refills: 0 | Status: SHIPPED | OUTPATIENT
Start: 2019-12-20

## 2019-12-20 NOTE — TELEPHONE ENCOUNTER
Thank you for entering that. I see that I entered the EMG but not MRI. Let's see when we can get the testing done and then I can make a decision. I will be referring her to Dr. Arndt as well but I need to have the EMG and Lspine done first.

## 2019-12-27 DIAGNOSIS — M54.50 LUMBAR PAIN: ICD-10-CM

## 2019-12-30 DIAGNOSIS — R94.130 ABNORMAL NCS (NERVE CONDUCTION STUDIES): ICD-10-CM

## 2019-12-30 DIAGNOSIS — R94.131 ABNORMAL EMG: ICD-10-CM

## 2019-12-30 DIAGNOSIS — M21.371 FOOT DROP, RIGHT FOOT: Primary | ICD-10-CM

## 2020-01-01 ENCOUNTER — OFFICE VISIT CONVERTED (OUTPATIENT)
Dept: FAMILY MEDICINE CLINIC | Age: 65
End: 2020-01-01
Attending: FAMILY MEDICINE

## 2020-01-01 ENCOUNTER — TELEPHONE (OUTPATIENT)
Dept: ORTHOPEDIC SURGERY | Facility: CLINIC | Age: 65
End: 2020-01-01

## 2020-01-01 ENCOUNTER — HOSPITAL ENCOUNTER (OUTPATIENT)
Dept: OTHER | Facility: HOSPITAL | Age: 65
Discharge: HOME | End: 2020-10-08
Attending: FAMILY MEDICINE

## 2020-01-01 ENCOUNTER — HOSPITAL ENCOUNTER (OUTPATIENT)
Dept: OTHER | Facility: HOSPITAL | Age: 65
Discharge: HOME | End: 2020-09-16
Attending: FAMILY MEDICINE

## 2020-01-01 ENCOUNTER — OFFICE VISIT (OUTPATIENT)
Dept: ORTHOPEDIC SURGERY | Facility: CLINIC | Age: 65
End: 2020-01-01

## 2020-01-01 VITALS — TEMPERATURE: 97.9 F | WEIGHT: 95 LBS | HEIGHT: 62 IN | BODY MASS INDEX: 17.48 KG/M2

## 2020-01-01 DIAGNOSIS — Z87.81 S/P ORIF (OPEN REDUCTION INTERNAL FIXATION) FRACTURE: Primary | ICD-10-CM

## 2020-01-01 DIAGNOSIS — S72.22XG CLOSED DISPLACED SUBTROCHANTERIC FRACTURE OF LEFT FEMUR WITH DELAYED HEALING, SUBSEQUENT ENCOUNTER: Primary | ICD-10-CM

## 2020-01-01 DIAGNOSIS — S72.22XD CLOSED DISPLACED SUBTROCHANTERIC FRACTURE OF LEFT FEMUR WITH ROUTINE HEALING, SUBSEQUENT ENCOUNTER: ICD-10-CM

## 2020-01-01 DIAGNOSIS — Z98.890 S/P ORIF (OPEN REDUCTION INTERNAL FIXATION) FRACTURE: Primary | ICD-10-CM

## 2020-01-01 LAB
25(OH)D3 SERPL-MCNC: 68.2 NG/ML (ref 30–100)
ALBUMIN SERPL-MCNC: 4.1 G/DL (ref 3.5–5)
ALBUMIN/GLOB SERPL: 1.4 {RATIO} (ref 1.4–2.6)
ALP SERPL-CCNC: 49 U/L (ref 43–160)
ALT SERPL-CCNC: 16 U/L (ref 10–40)
AMPICILLIN SUSC ISLT: 8
AMPICILLIN+SULBAC SUSC ISLT: 4
ANION GAP SERPL CALC-SCNC: 15 MMOL/L (ref 8–19)
APPEARANCE UR: ABNORMAL
APPEARANCE UR: CLEAR
AST SERPL-CCNC: 24 U/L (ref 15–50)
BACTERIA UR CULT: ABNORMAL
BACTERIA UR QL AUTO: ABNORMAL
BACTERIA UR QL AUTO: NORMAL
BILIRUB SERPL-MCNC: 0.39 MG/DL (ref 0.2–1.3)
BILIRUB UR QL: NEGATIVE
BILIRUB UR QL: NEGATIVE
BUN SERPL-MCNC: 16 MG/DL (ref 5–25)
BUN/CREAT SERPL: 39 {RATIO} (ref 6–20)
CALCIUM SERPL-MCNC: 8.7 MG/DL (ref 8.7–10.4)
CASTS URNS QL MICRO: ABNORMAL /[LPF]
CASTS URNS QL MICRO: NORMAL /[LPF]
CEFAZOLIN SUSC ISLT: <=4
CEFEPIME SUSC ISLT: <=0.12
CEFTAZIDIME SUSC ISLT: <=1
CEFTRIAXONE SUSC ISLT: <=0.25
CHLORIDE SERPL-SCNC: 100 MMOL/L (ref 99–111)
CIPROFLOXACIN SUSC ISLT: <=0.25
COLOR UR: YELLOW
COLOR UR: YELLOW
CONV CO2: 27 MMOL/L (ref 22–32)
CONV LEUKOCYTE ESTERASE: ABNORMAL
CONV LEUKOCYTE ESTERASE: NEGATIVE
CONV TOTAL PROTEIN: 7 G/DL (ref 6.3–8.2)
CONV UROBILINOGEN IN URINE BY AUTOMATED TEST STRIP: 0.2 {EHRLICHU}/DL (ref 0.1–1)
CONV UROBILINOGEN IN URINE BY AUTOMATED TEST STRIP: 0.2 {EHRLICHU}/DL (ref 0.1–1)
CREAT UR-MCNC: 0.41 MG/DL (ref 0.5–0.9)
EPI CELLS #/AREA URNS HPF: ABNORMAL /[HPF]
EPI CELLS #/AREA URNS HPF: NORMAL /[HPF]
ERTAPENEM SUSC ISLT: <=0.12
ERYTHROCYTE [DISTWIDTH] IN BLOOD BY AUTOMATED COUNT: 13.3 % (ref 11.5–14.5)
FOLATE SERPL-MCNC: >20 NG/ML (ref 4.8–20)
GENTAMICIN SUSC ISLT: <=1
GFR SERPLBLD BASED ON 1.73 SQ M-ARVRAT: >60 ML/MIN/{1.73_M2}
GLOBULIN UR ELPH-MCNC: 2.9 G/DL (ref 2–3.5)
GLUCOSE 24H UR-MCNC: NEGATIVE MG/DL
GLUCOSE 24H UR-MCNC: NEGATIVE MG/DL
GLUCOSE SERPL-MCNC: 85 MG/DL (ref 65–99)
HBA1C MFR BLD: 13.7 G/DL (ref 12–16)
HCT VFR BLD AUTO: 40.4 % (ref 37–47)
HGB UR QL STRIP: ABNORMAL
HGB UR QL STRIP: NEGATIVE
IRON SATN MFR SERPL: 37 % (ref 20–55)
IRON SERPL-MCNC: 102 UG/DL (ref 60–170)
KETONES UR QL STRIP: NEGATIVE MG/DL
KETONES UR QL STRIP: NEGATIVE MG/DL
LEVOFLOXACIN SUSC ISLT: <=0.12
MAGNESIUM SERPL-MCNC: 1.84 MG/DL (ref 1.6–2.3)
MCH RBC QN AUTO: 30.2 PG (ref 27–31)
MCHC RBC AUTO-ENTMCNC: 33.9 G/DL (ref 33–37)
MCV RBC AUTO: 89.2 FL (ref 81–99)
MUCOUS THREADS URNS QL MICRO: ABNORMAL
MUCOUS THREADS URNS QL MICRO: NORMAL
NITRITE UR-MCNC: NEGATIVE MG/ML
NITRITE UR-MCNC: POSITIVE MG/ML
NITROFURANTOIN SUSC ISLT: <=16
OSMOLALITY SERPL CALC.SUM OF ELEC: 284 MOSM/KG (ref 273–304)
PH UR STRIP.AUTO: 7 [PH] (ref 5–8)
PH UR STRIP.AUTO: 7 [PH] (ref 5–8)
PIP+TAZO SUSC ISLT: <=4
PLATELET # BLD AUTO: 205 10*3/UL (ref 130–400)
PMV BLD AUTO: 9.9 FL (ref 7.4–10.4)
POTASSIUM SERPL-SCNC: 4.5 MMOL/L (ref 3.5–5.3)
PROT UR-MCNC: NEGATIVE MG/DL
PROT UR-MCNC: NEGATIVE MG/DL
RBC # BLD AUTO: 4.53 10*6/UL (ref 4.2–5.4)
RBC # BLD AUTO: ABNORMAL /[HPF]
RBC # BLD AUTO: NORMAL /[HPF]
SODIUM SERPL-SCNC: 137 MMOL/L (ref 135–147)
SP GR UR STRIP: 1.01 (ref 1–1.03)
SP GR UR STRIP: 1.01 (ref 1–1.03)
SPECIMEN SOURCE: ABNORMAL
SPECIMEN SOURCE: NORMAL
T4 FREE SERPL-MCNC: 1.6 NG/DL (ref 0.9–1.8)
TIBC SERPL-MCNC: 277 UG/DL (ref 245–450)
TMP SMX SUSC ISLT: <=20
TOBRAMYCIN SUSC ISLT: <=1
TRANSFERRIN SERPL-MCNC: 194 MG/DL (ref 250–380)
TSH SERPL-ACNC: 2.86 M[IU]/L (ref 0.27–4.2)
UNIDENT CRYS URNS QL MICRO: ABNORMAL /[HPF]
UNIDENT CRYS URNS QL MICRO: NORMAL /[HPF]
VIT B12 SERPL-MCNC: 868 PG/ML (ref 211–911)
WBC # BLD AUTO: 6.5 10*3/UL (ref 4.8–10.8)
WBC #/AREA URNS HPF: ABNORMAL /[HPF]
WBC #/AREA URNS HPF: NORMAL /[HPF]

## 2020-01-01 PROCEDURE — 99213 OFFICE O/P EST LOW 20 MIN: CPT | Performed by: ORTHOPAEDIC SURGERY

## 2020-01-01 RX ORDER — OMEPRAZOLE 20 MG/1
CAPSULE, DELAYED RELEASE ORAL
COMMUNITY
Start: 2020-01-01

## 2020-01-01 RX ORDER — MELOXICAM 7.5 MG/1
7.5 TABLET ORAL DAILY
Qty: 90 TABLET | Refills: 1 | Status: SHIPPED | OUTPATIENT
Start: 2020-01-01

## 2020-01-28 ENCOUNTER — OFFICE VISIT CONVERTED (OUTPATIENT)
Dept: FAMILY MEDICINE CLINIC | Age: 65
End: 2020-01-28
Attending: FAMILY MEDICINE

## 2020-01-28 ENCOUNTER — HOSPITAL ENCOUNTER (OUTPATIENT)
Dept: OTHER | Facility: HOSPITAL | Age: 65
Discharge: HOME | End: 2020-01-28
Attending: FAMILY MEDICINE

## 2020-01-28 LAB
ALBUMIN SERPL-MCNC: 4 G/DL (ref 3.5–5)
ALBUMIN/GLOB SERPL: 1.5 {RATIO} (ref 1.4–2.6)
ALP SERPL-CCNC: 44 U/L (ref 43–160)
ALT SERPL-CCNC: 23 U/L (ref 10–40)
ANION GAP SERPL CALC-SCNC: 17 MMOL/L (ref 8–19)
AST SERPL-CCNC: 30 U/L (ref 15–50)
BASOPHILS # BLD MANUAL: 0.06 10*3/UL (ref 0–0.2)
BASOPHILS NFR BLD MANUAL: 0.9 % (ref 0–3)
BILIRUB SERPL-MCNC: 0.23 MG/DL (ref 0.2–1.3)
BNP SERPL-MCNC: 1267 PG/ML (ref 0–900)
BUN SERPL-MCNC: 13 MG/DL (ref 5–25)
BUN/CREAT SERPL: 23 {RATIO} (ref 6–20)
CALCIUM SERPL-MCNC: 8.6 MG/DL (ref 8.7–10.4)
CHLORIDE SERPL-SCNC: 104 MMOL/L (ref 99–111)
CONV CO2: 22 MMOL/L (ref 22–32)
CONV TOTAL PROTEIN: 6.6 G/DL (ref 6.3–8.2)
CREAT UR-MCNC: 0.56 MG/DL (ref 0.5–0.9)
DEPRECATED RDW RBC AUTO: 45.4 FL
EOSINOPHIL # BLD MANUAL: 0.47 10*3/UL (ref 0–0.7)
EOSINOPHIL NFR BLD MANUAL: 6.8 % (ref 0–7)
ERYTHROCYTE [DISTWIDTH] IN BLOOD BY AUTOMATED COUNT: 13.5 % (ref 11.5–14.5)
GFR SERPLBLD BASED ON 1.73 SQ M-ARVRAT: >60 ML/MIN/{1.73_M2}
GLOBULIN UR ELPH-MCNC: 2.6 G/DL (ref 2–3.5)
GLUCOSE SERPL-MCNC: 103 MG/DL (ref 65–99)
GRANS (ABSOLUTE): 3.58 10*3/UL (ref 2–8)
GRANS: 51.8 % (ref 30–85)
HBA1C MFR BLD: 12 G/DL (ref 12–16)
HCT VFR BLD AUTO: 36.2 % (ref 37–47)
IMM GRANULOCYTES # BLD: 0.01 10*3/UL (ref 0–0.54)
IMM GRANULOCYTES NFR BLD: 0.1 % (ref 0–0.43)
LYMPHOCYTES # BLD MANUAL: 2.06 10*3/UL (ref 1–5)
LYMPHOCYTES NFR BLD MANUAL: 10.6 % (ref 3–10)
MCH RBC QN AUTO: 30.5 PG (ref 27–31)
MCHC RBC AUTO-ENTMCNC: 33.1 G/DL (ref 33–37)
MCV RBC AUTO: 92.1 FL (ref 81–99)
MONOCYTES # BLD AUTO: 0.73 10*3/UL (ref 0.2–1.2)
OSMOLALITY SERPL CALC.SUM OF ELEC: 288 MOSM/KG (ref 273–304)
PLATELET # BLD AUTO: 200 10*3/UL (ref 130–400)
PMV BLD AUTO: 9.7 FL (ref 7.4–10.4)
POTASSIUM SERPL-SCNC: 4.4 MMOL/L (ref 3.5–5.3)
RBC # BLD AUTO: 3.93 10*6/UL (ref 4.2–5.4)
SODIUM SERPL-SCNC: 139 MMOL/L (ref 135–147)
VARIANT LYMPHS NFR BLD MANUAL: 29.8 % (ref 20–45)
WBC # BLD AUTO: 6.91 10*3/UL (ref 4.8–10.8)

## 2020-02-06 ENCOUNTER — HOSPITAL ENCOUNTER (OUTPATIENT)
Dept: OTHER | Facility: HOSPITAL | Age: 65
Discharge: HOME | End: 2020-02-06
Attending: FAMILY MEDICINE

## 2020-02-06 LAB
ALBUMIN SERPL-MCNC: 3.8 G/DL (ref 3.5–5)
ALBUMIN/GLOB SERPL: 1.5 {RATIO} (ref 1.4–2.6)
ALP SERPL-CCNC: 39 U/L (ref 43–160)
ALT SERPL-CCNC: 18 U/L (ref 10–40)
ANION GAP SERPL CALC-SCNC: 19 MMOL/L (ref 8–19)
AST SERPL-CCNC: 26 U/L (ref 15–50)
BILIRUB SERPL-MCNC: 0.21 MG/DL (ref 0.2–1.3)
BUN SERPL-MCNC: 20 MG/DL (ref 5–25)
BUN/CREAT SERPL: 31 {RATIO} (ref 6–20)
CALCIUM SERPL-MCNC: 8.9 MG/DL (ref 8.7–10.4)
CHLORIDE SERPL-SCNC: 107 MMOL/L (ref 99–111)
CONV CO2: 20 MMOL/L (ref 22–32)
CONV TOTAL PROTEIN: 6.3 G/DL (ref 6.3–8.2)
CREAT UR-MCNC: 0.64 MG/DL (ref 0.5–0.9)
GFR SERPLBLD BASED ON 1.73 SQ M-ARVRAT: >60 ML/MIN/{1.73_M2}
GLOBULIN UR ELPH-MCNC: 2.5 G/DL (ref 2–3.5)
GLUCOSE SERPL-MCNC: 146 MG/DL (ref 65–99)
OSMOLALITY SERPL CALC.SUM OF ELEC: 299 MOSM/KG (ref 273–304)
POTASSIUM SERPL-SCNC: 4.1 MMOL/L (ref 3.5–5.3)
SODIUM SERPL-SCNC: 142 MMOL/L (ref 135–147)

## 2020-02-26 ENCOUNTER — OFFICE VISIT CONVERTED (OUTPATIENT)
Dept: FAMILY MEDICINE CLINIC | Age: 65
End: 2020-02-26
Attending: FAMILY MEDICINE

## 2020-03-16 ENCOUNTER — OUTSIDE FACILITY SERVICE (OUTPATIENT)
Dept: ORTHOPEDIC SURGERY | Facility: CLINIC | Age: 65
End: 2020-03-16

## 2020-03-16 PROCEDURE — 25607 OPTX DST RD XARTC FX/EPI SEP: CPT | Performed by: ORTHOPAEDIC SURGERY

## 2020-03-20 ENCOUNTER — OFFICE VISIT (OUTPATIENT)
Dept: ORTHOPEDIC SURGERY | Facility: CLINIC | Age: 65
End: 2020-03-20

## 2020-03-20 VITALS — WEIGHT: 97 LBS | TEMPERATURE: 98.4 F | HEIGHT: 62 IN | BODY MASS INDEX: 17.85 KG/M2

## 2020-03-20 DIAGNOSIS — Z87.81 S/P ORIF (OPEN REDUCTION INTERNAL FIXATION) FRACTURE: Primary | ICD-10-CM

## 2020-03-20 DIAGNOSIS — Z98.890 S/P ORIF (OPEN REDUCTION INTERNAL FIXATION) FRACTURE: Primary | ICD-10-CM

## 2020-03-20 PROCEDURE — 99024 POSTOP FOLLOW-UP VISIT: CPT | Performed by: ORTHOPAEDIC SURGERY

## 2020-03-20 PROCEDURE — 73100 X-RAY EXAM OF WRIST: CPT | Performed by: ORTHOPAEDIC SURGERY

## 2020-03-20 RX ORDER — POTASSIUM CHLORIDE 750 MG/1
CAPSULE, EXTENDED RELEASE ORAL
COMMUNITY
Start: 2020-03-05 | End: 2020-06-24 | Stop reason: HOSPADM

## 2020-03-20 RX ORDER — SERTRALINE HYDROCHLORIDE 25 MG/1
TABLET, FILM COATED ORAL
COMMUNITY
Start: 2020-02-26 | End: 2020-06-24 | Stop reason: HOSPADM

## 2020-03-20 RX ORDER — FUROSEMIDE 20 MG/1
TABLET ORAL
COMMUNITY
Start: 2020-03-05 | End: 2020-06-24 | Stop reason: HOSPADM

## 2020-03-20 RX ORDER — ONDANSETRON HYDROCHLORIDE 8 MG/1
TABLET, FILM COATED ORAL
COMMUNITY
Start: 2020-03-15 | End: 2020-06-24 | Stop reason: HOSPADM

## 2020-03-20 RX ORDER — HYDROCODONE BITARTRATE AND ACETAMINOPHEN 5; 325 MG/1; MG/1
TABLET ORAL EVERY 6 HOURS
COMMUNITY
End: 2020-06-24 | Stop reason: HOSPADM

## 2020-03-20 RX ORDER — TRAZODONE HYDROCHLORIDE 50 MG/1
TABLET ORAL
COMMUNITY
Start: 2020-02-26 | End: 2020-06-24 | Stop reason: HOSPADM

## 2020-03-20 RX ORDER — DORZOLAMIDE HYDROCHLORIDE AND TIMOLOL MALEATE 20; 5 MG/ML; MG/ML
SOLUTION/ DROPS OPHTHALMIC
COMMUNITY
Start: 2020-03-09

## 2020-03-20 NOTE — PROGRESS NOTES
OTHER POST OP GLOBAL     NAME: Richelle Morelos  ?  : 1955  ?  MRN: [id number]    Chief Complaint   Patient presents with   • Right Wrist - Follow-up, Pain   • Post-op     ?  Date of surgery: 2020.    HPI: P/O R WRIST ORIF ON 3/16/20  Patient returns today for 4 day follow up of right Distal radius ORIF. Incision(s) healing nicely/as expected with no signs of infection. Patient reports doing well with no unusual complaints. Appears to be progressing appropriately.      Review of Systems:      Ortho Exam:   Right wrist-ORIF distal radius fracture. There is no clinical deformity at this point. The distal radius fracture appears to be appropriately stabilized. Neurovascular status is intact. Radial artery function is maintained. Capillary refill is 2 seconds with a brisk return. Median nerve function is well preserved. No hardware related problems are noted. Length of the radius is restored compared to the distal ulna. There is no instability of the DRUJ. Skin and soft tissues are swollen and bruised appropriate with both the fracture and the post-surgical intervention. The forearm muscles are soft and nontender. No evidence of RSD or any other untoward surgical complications are noted.      Diagnostic Studies:  right Wrist X-Ray  Indication: Evaluation of fracture reduction and implant position after ORIF of her distal radius fracture.  AP, Lateral views  Findings: Anatomically reduced fracture with good position of the implants.  no bony lesion  Soft tissues within normal limits  within normal limits joint spaces  Hardware appropriately positioned yes      yes prior studies available for comparison.    X-RAY was ordered and reviewed by Naveen Michaud MD      Assessment:  Richelle was seen today for post-op, follow-up and pain.    Diagnoses and all orders for this visit:    S/P ORIF (open reduction internal fixation) fracture  -     XR Wrist 2 View Right          Plan   • Incision care  • Continue ice as  needed  • Aggressive ROM of the fingers and the wrist.  • The patient is being placed in an EXOS brace to immobilize the fracture and to allow the soft tissues to heal appropriately.  • Discussed with the patient about calcium and vitamin D for bone health.  • Stretching and strengthening exercises  • Tylenol 500-1000mg by mouth every 6 hours as needed for pain   • Fall precautions  • Follow up in 4 week(s)     Date of encounter: 03/20/2020  Naveen Michaud MD

## 2020-03-24 ENCOUNTER — OFFICE VISIT CONVERTED (OUTPATIENT)
Dept: FAMILY MEDICINE CLINIC | Age: 65
End: 2020-03-24
Attending: FAMILY MEDICINE

## 2020-04-01 ENCOUNTER — OFFICE VISIT CONVERTED (OUTPATIENT)
Dept: FAMILY MEDICINE CLINIC | Age: 65
End: 2020-04-01
Attending: FAMILY MEDICINE

## 2020-04-16 ENCOUNTER — OFFICE VISIT (OUTPATIENT)
Dept: ORTHOPEDIC SURGERY | Facility: CLINIC | Age: 65
End: 2020-04-16

## 2020-04-16 ENCOUNTER — OFFICE VISIT CONVERTED (OUTPATIENT)
Dept: FAMILY MEDICINE CLINIC | Age: 65
End: 2020-04-16
Attending: FAMILY MEDICINE

## 2020-04-16 VITALS — HEIGHT: 62 IN | WEIGHT: 96 LBS | TEMPERATURE: 98.4 F | BODY MASS INDEX: 17.66 KG/M2

## 2020-04-16 DIAGNOSIS — Z98.890 S/P ORIF (OPEN REDUCTION INTERNAL FIXATION) FRACTURE: Primary | ICD-10-CM

## 2020-04-16 DIAGNOSIS — Z87.81 S/P ORIF (OPEN REDUCTION INTERNAL FIXATION) FRACTURE: Primary | ICD-10-CM

## 2020-04-16 PROCEDURE — 99024 POSTOP FOLLOW-UP VISIT: CPT | Performed by: ORTHOPAEDIC SURGERY

## 2020-04-16 PROCEDURE — 73100 X-RAY EXAM OF WRIST: CPT | Performed by: ORTHOPAEDIC SURGERY

## 2020-04-16 RX ORDER — RILUZOLE 50 MG/1
TABLET, FILM COATED ORAL
COMMUNITY
Start: 2020-04-03 | End: 2020-06-24 | Stop reason: HOSPADM

## 2020-04-16 NOTE — PROGRESS NOTES
OTHER POST OP GLOBAL     NAME: Richelle Morelos  ?  : 1955  ?  MRN: [id number]    Chief Complaint   Patient presents with   • Right Wrist - Follow-up, Pain   • Post-op     ?  Date of surgery: Patient underwent open reduction internal fixation of a right distal radius fracture on 2020.    HPI:P/O R WRIST ORIF 3/16/20   Patient returns today for 4 week follow up of right Distal radius fracture. Incision(s) healed nicely with no signs of infection. Patient reports doing well with no unusual complaints. Appears to be progressing appropriately.  She has done extremely well postoperatively.  There is no clinical deformity.  There is no hardware related problem.  She is making good progress with range of motion exercises.  She is able to make a fist without any difficulty.  Physical therapy has helped her tremendously to improve the range of motion.  She is still weak with this upper extremity on her  strength.      Review of Systems:      Ortho Exam:   Right wrist-ORIF distal radius fracture. There is no clinical deformity at this point. The distal radius fracture appears to be appropriately stabilized. Neurovascular status is intact. Radial artery function is maintained. Capillary refill is 2 seconds with a brisk return. Median nerve function is well preserved. No hardware related problems are noted. Length of the radius is restored compared to the distal ulna. There is no instability of the DRUJ. Skin and soft tissues are swollen and bruised appropriate with both the fracture and the post-surgical intervention. The forearm muscles are soft and nontender. No evidence of RSD or any other untoward surgical complications are noted.      Diagnostic Studies:  xrays obtained today  right Wrist X-Ray  Indication: Evaluation of healing of a distal radius fracture status post ORIF.  AP, Lateral views  Findings: Anatomically acceptable position of the fracture fragments with the good position of the implants.  no  bony lesion  Soft tissues within normal limits  within normal limits joint spaces  Hardware appropriately positioned yes      yes prior studies available for comparison.    X-RAY was ordered and reviewed by Naveen Michaud MD    Assessment:  Richelle was seen today for post-op, follow-up and pain.    Diagnoses and all orders for this visit:    S/P ORIF (open reduction internal fixation) fracture  -     XR Wrist 2 View Right  -     Ambulatory Referral to Physical Therapy Evaluate and treat          Plan   • Incision care  • Continue ice as needed  • Aggressive ROM in the physical therapy department following the wrist fracture fixation protocols.  • Stretching and strengthening exercises of the flexors and extensors of the wrist.  • Calcium and vitamin D for bone health.  • Tylenol 500-1000mg by mouth every 6 hours as needed for pain   • Fall precautions  • Follow up in 4 week(s)     Date of encounter: 04/16/2020  Naveen Michaud MD

## 2020-05-04 ENCOUNTER — OFFICE VISIT CONVERTED (OUTPATIENT)
Dept: FAMILY MEDICINE CLINIC | Age: 65
End: 2020-05-04
Attending: FAMILY MEDICINE

## 2020-05-13 ENCOUNTER — OFFICE VISIT CONVERTED (OUTPATIENT)
Dept: FAMILY MEDICINE CLINIC | Age: 65
End: 2020-05-13
Attending: FAMILY MEDICINE

## 2020-05-14 ENCOUNTER — OFFICE VISIT (OUTPATIENT)
Dept: ORTHOPEDIC SURGERY | Facility: CLINIC | Age: 65
End: 2020-05-14

## 2020-05-14 VITALS — TEMPERATURE: 97.7 F

## 2020-05-14 DIAGNOSIS — Z98.890 S/P ORIF (OPEN REDUCTION INTERNAL FIXATION) FRACTURE: Primary | ICD-10-CM

## 2020-05-14 DIAGNOSIS — Z87.81 S/P ORIF (OPEN REDUCTION INTERNAL FIXATION) FRACTURE: Primary | ICD-10-CM

## 2020-05-14 PROCEDURE — 73100 X-RAY EXAM OF WRIST: CPT | Performed by: ORTHOPAEDIC SURGERY

## 2020-05-14 PROCEDURE — 99024 POSTOP FOLLOW-UP VISIT: CPT | Performed by: ORTHOPAEDIC SURGERY

## 2020-05-14 NOTE — PROGRESS NOTES
OTHER POST OP GLOBAL     NAME: Richelle Morelos  ?  : 1955  ?  MRN: [id number]    Chief Complaint   Patient presents with   • Right Wrist - Follow-up, Pain     ?  Date of surgery: Patient underwent open reduction internal fixation of a displaced left distal radius fracture on 2020.    HPI:   Patient returns today for 8 week follow up of right Distal radius 3 part metaphyseal fracture ORIF right DistaL radius. Incision(s) healed nicely with no signs of infection. Patient reports doing well with no unusual complaints. Appears to be progressing appropriately.  She does not have a clinical deformity.  She is doing extremely well with improved range of motion.  She is currently undergoing physical therapy and I am encouraging her to continue to do so.  She does not have any implant related problems.  The patient states that she is very pleased with her postoperative outcome following a distal radius fracture treated with volar plate fixation.      Review of Systems:      Ortho Exam:   Right wrist-ORIF distal radius fracture. There is no clinical deformity at this point. The distal radius fracture appears to be appropriately stabilized. Neurovascular status is intact. Radial artery function is maintained. Capillary refill is 2 seconds with a brisk return. Median nerve function is well preserved. No hardware related problems are noted. Length of the radius is restored compared to the distal ulna. There is no instability of the DRUJ. Skin and soft tissues are swollen and bruised appropriate with both the fracture and the post-surgical intervention. The forearm muscles are soft and nontender. No evidence of RSD or any other untoward surgical complications are noted.  Her range of motion is near normal.  Dorsiflexion is 0 to 30 degrees.  Palmar flexion is 0 to 40 degrees.      Diagnostic Studies:  xrays obtained today  right Wrist X-Ray  Indication: Evaluation of healing of a distal radius fracture.  AP, Lateral  views  Findings: Anatomically acceptable position of the fracture fragments with some callus formation noted.  no bony lesion  Soft tissues within normal limits  within normal limits joint spaces  Hardware appropriately positioned yes      yes prior studies available for comparison.    X-RAY was ordered and reviewed by Naveen Michaud MD    Assessment:  Richelle was seen today for follow-up and pain.    Diagnoses and all orders for this visit:    S/P ORIF (open reduction internal fixation) fracture  -     XR Wrist 2 View Right          Plan   • Incision care  • Continue ice as needed  • Aggressive ROM  • Stretching and strengthening exercises  • Tylenol 500-1000mg by mouth every 6 hours as needed for pain   • Fall precautions  • Follow up in 3 month(s)     Date of encounter: 05/14/2020  Naveen Michaud MD

## 2020-06-04 ENCOUNTER — OUTSIDE FACILITY SERVICE (OUTPATIENT)
Dept: ORTHOPEDIC SURGERY | Facility: CLINIC | Age: 65
End: 2020-06-04

## 2020-06-04 PROCEDURE — 99222 1ST HOSP IP/OBS MODERATE 55: CPT | Performed by: ORTHOPAEDIC SURGERY

## 2020-06-05 ENCOUNTER — OUTSIDE FACILITY SERVICE (OUTPATIENT)
Dept: ORTHOPEDIC SURGERY | Facility: CLINIC | Age: 65
End: 2020-06-05

## 2020-06-05 PROCEDURE — 27235 TREAT THIGH FRACTURE: CPT | Performed by: ORTHOPAEDIC SURGERY

## 2020-06-16 ENCOUNTER — HOSPITAL ENCOUNTER (INPATIENT)
Facility: HOSPITAL | Age: 65
LOS: 7 days | Discharge: NURSING, REHAB OR LONG-TERM CARE | End: 2020-06-24
Attending: HOSPITALIST | Admitting: ORTHOPAEDIC SURGERY

## 2020-06-16 DIAGNOSIS — S72.002A LEFT DISPLACED FEMORAL NECK FRACTURE (HCC): ICD-10-CM

## 2020-06-16 DIAGNOSIS — S72.22XA CLOSED DISPLACED SUBTROCHANTERIC FRACTURE OF LEFT FEMUR, INITIAL ENCOUNTER (HCC): Primary | ICD-10-CM

## 2020-06-16 DIAGNOSIS — M21.371 FOOT DROP, RIGHT FOOT: ICD-10-CM

## 2020-06-16 DIAGNOSIS — M25.561 RIGHT KNEE PAIN, UNSPECIFIED CHRONICITY: ICD-10-CM

## 2020-06-16 PROCEDURE — G0378 HOSPITAL OBSERVATION PER HR: HCPCS

## 2020-06-16 RX ORDER — ONDANSETRON 4 MG/1
4 TABLET, FILM COATED ORAL EVERY 6 HOURS PRN
COMMUNITY
End: 2020-06-24 | Stop reason: HOSPADM

## 2020-06-16 RX ORDER — DOCUSATE SODIUM 100 MG/1
100 CAPSULE, LIQUID FILLED ORAL DAILY PRN
COMMUNITY

## 2020-06-16 RX ORDER — ALBUTEROL SULFATE 2.5 MG/3ML
2.5 SOLUTION RESPIRATORY (INHALATION) EVERY 6 HOURS PRN
COMMUNITY

## 2020-06-16 RX ORDER — OXYCODONE HYDROCHLORIDE AND ACETAMINOPHEN 5; 325 MG/1; MG/1
1 TABLET ORAL EVERY 4 HOURS PRN
Status: ON HOLD | COMMUNITY
End: 2020-06-23 | Stop reason: SDUPTHER

## 2020-06-16 RX ORDER — LATANOPROST 50 UG/ML
1 SOLUTION/ DROPS OPHTHALMIC DAILY
COMMUNITY
Start: 2020-05-04

## 2020-06-16 RX ORDER — CHOLECALCIFEROL (VITAMIN D3) 125 MCG
5 CAPSULE ORAL NIGHTLY PRN
COMMUNITY

## 2020-06-16 RX ORDER — LIDOCAINE 50 MG/G
1 PATCH TOPICAL EVERY 24 HOURS
COMMUNITY
End: 2020-06-24 | Stop reason: HOSPADM

## 2020-06-16 RX ORDER — POLYETHYLENE GLYCOL 3350 17 G/17G
17 POWDER, FOR SOLUTION ORAL DAILY
COMMUNITY

## 2020-06-16 RX ORDER — FERROUS SULFATE TAB EC 324 MG (65 MG FE EQUIVALENT) 324 (65 FE) MG
324 TABLET DELAYED RESPONSE ORAL 2 TIMES DAILY WITH MEALS
COMMUNITY

## 2020-06-17 ENCOUNTER — APPOINTMENT (OUTPATIENT)
Dept: GENERAL RADIOLOGY | Facility: HOSPITAL | Age: 65
End: 2020-06-17

## 2020-06-17 PROBLEM — S72.002A LEFT DISPLACED FEMORAL NECK FRACTURE (HCC): Status: ACTIVE | Noted: 2020-06-17

## 2020-06-17 LAB
ANION GAP SERPL CALCULATED.3IONS-SCNC: 7.4 MMOL/L (ref 5–15)
BASOPHILS # BLD AUTO: 0.04 10*3/MM3 (ref 0–0.2)
BASOPHILS NFR BLD AUTO: 0.5 % (ref 0–1.5)
BUN BLD-MCNC: 17 MG/DL (ref 8–23)
BUN/CREAT SERPL: 45.9 (ref 7–25)
CALCIUM SPEC-SCNC: 8.6 MG/DL (ref 8.6–10.5)
CHLORIDE SERPL-SCNC: 97 MMOL/L (ref 98–107)
CO2 SERPL-SCNC: 27.6 MMOL/L (ref 22–29)
CREAT BLD-MCNC: 0.37 MG/DL (ref 0.57–1)
DEPRECATED RDW RBC AUTO: 48.5 FL (ref 37–54)
EOSINOPHIL # BLD AUTO: 0.34 10*3/MM3 (ref 0–0.4)
EOSINOPHIL NFR BLD AUTO: 3.8 % (ref 0.3–6.2)
ERYTHROCYTE [DISTWIDTH] IN BLOOD BY AUTOMATED COUNT: 15.2 % (ref 12.3–15.4)
GFR SERPL CREATININE-BSD FRML MDRD: >150 ML/MIN/1.73
GLUCOSE BLD-MCNC: 92 MG/DL (ref 65–99)
HCT VFR BLD AUTO: 26.8 % (ref 34–46.6)
HGB BLD-MCNC: 8.6 G/DL (ref 12–15.9)
IMM GRANULOCYTES # BLD AUTO: 0.18 10*3/MM3 (ref 0–0.05)
IMM GRANULOCYTES NFR BLD AUTO: 2 % (ref 0–0.5)
LYMPHOCYTES # BLD AUTO: 1.23 10*3/MM3 (ref 0.7–3.1)
LYMPHOCYTES NFR BLD AUTO: 13.9 % (ref 19.6–45.3)
MCH RBC QN AUTO: 29 PG (ref 26.6–33)
MCHC RBC AUTO-ENTMCNC: 32.1 G/DL (ref 31.5–35.7)
MCV RBC AUTO: 90.2 FL (ref 79–97)
MONOCYTES # BLD AUTO: 0.86 10*3/MM3 (ref 0.1–0.9)
MONOCYTES NFR BLD AUTO: 9.7 % (ref 5–12)
NEUTROPHILS # BLD AUTO: 6.2 10*3/MM3 (ref 1.7–7)
NEUTROPHILS NFR BLD AUTO: 70.1 % (ref 42.7–76)
NRBC BLD AUTO-RTO: 0 /100 WBC (ref 0–0.2)
PLATELET # BLD AUTO: 408 10*3/MM3 (ref 140–450)
PMV BLD AUTO: 8.8 FL (ref 6–12)
POTASSIUM BLD-SCNC: 4 MMOL/L (ref 3.5–5.2)
RBC # BLD AUTO: 2.97 10*6/MM3 (ref 3.77–5.28)
SARS-COV-2 N GENE NPH QL NAA+PROBE: NOT DETECTED
SODIUM BLD-SCNC: 132 MMOL/L (ref 136–145)
WBC NRBC COR # BLD: 8.85 10*3/MM3 (ref 3.4–10.8)

## 2020-06-17 PROCEDURE — 93005 ELECTROCARDIOGRAM TRACING: CPT | Performed by: HOSPITALIST

## 2020-06-17 PROCEDURE — 71045 X-RAY EXAM CHEST 1 VIEW: CPT

## 2020-06-17 PROCEDURE — 94640 AIRWAY INHALATION TREATMENT: CPT

## 2020-06-17 PROCEDURE — 73552 X-RAY EXAM OF FEMUR 2/>: CPT

## 2020-06-17 PROCEDURE — 93010 ELECTROCARDIOGRAM REPORT: CPT | Performed by: INTERNAL MEDICINE

## 2020-06-17 PROCEDURE — 87635 SARS-COV-2 COVID-19 AMP PRB: CPT | Performed by: NURSE PRACTITIONER

## 2020-06-17 PROCEDURE — 72170 X-RAY EXAM OF PELVIS: CPT

## 2020-06-17 PROCEDURE — 80048 BASIC METABOLIC PNL TOTAL CA: CPT | Performed by: HOSPITALIST

## 2020-06-17 PROCEDURE — 85025 COMPLETE CBC W/AUTO DIFF WBC: CPT | Performed by: HOSPITALIST

## 2020-06-17 PROCEDURE — 94799 UNLISTED PULMONARY SVC/PX: CPT

## 2020-06-17 RX ORDER — FERROUS SULFATE 325(65) MG
325 TABLET ORAL
Status: DISCONTINUED | OUTPATIENT
Start: 2020-06-17 | End: 2020-06-24 | Stop reason: HOSPADM

## 2020-06-17 RX ORDER — PANTOPRAZOLE SODIUM 40 MG/1
40 TABLET, DELAYED RELEASE ORAL EVERY MORNING
Status: DISCONTINUED | OUTPATIENT
Start: 2020-06-17 | End: 2020-06-24 | Stop reason: HOSPADM

## 2020-06-17 RX ORDER — FLUTICASONE PROPIONATE 50 MCG
1 SPRAY, SUSPENSION (ML) NASAL 2 TIMES DAILY
Status: DISCONTINUED | OUTPATIENT
Start: 2020-06-17 | End: 2020-06-24 | Stop reason: HOSPADM

## 2020-06-17 RX ORDER — OXYCODONE HYDROCHLORIDE AND ACETAMINOPHEN 5; 325 MG/1; MG/1
1 TABLET ORAL EVERY 4 HOURS PRN
Status: DISCONTINUED | OUTPATIENT
Start: 2020-06-17 | End: 2020-06-24 | Stop reason: HOSPADM

## 2020-06-17 RX ORDER — TIMOLOL MALEATE 5 MG/ML
1 SOLUTION OPHTHALMIC DAILY
Status: DISCONTINUED | OUTPATIENT
Start: 2020-06-17 | End: 2020-06-24 | Stop reason: HOSPADM

## 2020-06-17 RX ORDER — POTASSIUM CHLORIDE 750 MG/1
10 CAPSULE, EXTENDED RELEASE ORAL DAILY
Status: DISCONTINUED | OUTPATIENT
Start: 2020-06-17 | End: 2020-06-17

## 2020-06-17 RX ORDER — DORZOLAMIDE HYDROCHLORIDE AND TIMOLOL MALEATE 20; 5 MG/ML; MG/ML
1 SOLUTION/ DROPS OPHTHALMIC 2 TIMES DAILY
Status: DISCONTINUED | OUTPATIENT
Start: 2020-06-17 | End: 2020-06-17 | Stop reason: CLARIF

## 2020-06-17 RX ORDER — CHOLECALCIFEROL (VITAMIN D3) 125 MCG
5 CAPSULE ORAL NIGHTLY PRN
Status: DISCONTINUED | OUTPATIENT
Start: 2020-06-17 | End: 2020-06-24 | Stop reason: HOSPADM

## 2020-06-17 RX ORDER — HYDROMORPHONE HYDROCHLORIDE 1 MG/ML
0.5 INJECTION, SOLUTION INTRAMUSCULAR; INTRAVENOUS; SUBCUTANEOUS EVERY 4 HOURS PRN
Status: DISCONTINUED | OUTPATIENT
Start: 2020-06-17 | End: 2020-06-21

## 2020-06-17 RX ORDER — DOCUSATE SODIUM 100 MG/1
100 CAPSULE, LIQUID FILLED ORAL DAILY PRN
Status: DISCONTINUED | OUTPATIENT
Start: 2020-06-17 | End: 2020-06-24 | Stop reason: HOSPADM

## 2020-06-17 RX ORDER — POLYETHYLENE GLYCOL 3350 17 G/17G
17 POWDER, FOR SOLUTION ORAL DAILY
Status: DISCONTINUED | OUTPATIENT
Start: 2020-06-17 | End: 2020-06-24 | Stop reason: HOSPADM

## 2020-06-17 RX ORDER — FUROSEMIDE 20 MG/1
20 TABLET ORAL DAILY
Status: DISCONTINUED | OUTPATIENT
Start: 2020-06-17 | End: 2020-06-17

## 2020-06-17 RX ORDER — TIMOLOL MALEATE 5 MG/ML
1 SOLUTION/ DROPS OPHTHALMIC EVERY 12 HOURS SCHEDULED
Status: DISCONTINUED | OUTPATIENT
Start: 2020-06-17 | End: 2020-06-17 | Stop reason: RX

## 2020-06-17 RX ORDER — LATANOPROST 50 UG/ML
1 SOLUTION/ DROPS OPHTHALMIC NIGHTLY
Status: DISCONTINUED | OUTPATIENT
Start: 2020-06-17 | End: 2020-06-24 | Stop reason: HOSPADM

## 2020-06-17 RX ORDER — LEVOTHYROXINE SODIUM 0.07 MG/1
75 TABLET ORAL
Status: DISCONTINUED | OUTPATIENT
Start: 2020-06-17 | End: 2020-06-24 | Stop reason: HOSPADM

## 2020-06-17 RX ORDER — DORZOLAMIDE HCL 20 MG/ML
1 SOLUTION/ DROPS OPHTHALMIC 2 TIMES DAILY
Status: DISCONTINUED | OUTPATIENT
Start: 2020-06-17 | End: 2020-06-24 | Stop reason: HOSPADM

## 2020-06-17 RX ORDER — OXYCODONE HYDROCHLORIDE AND ACETAMINOPHEN 5; 325 MG/1; MG/1
2 TABLET ORAL EVERY 4 HOURS PRN
Status: DISCONTINUED | OUTPATIENT
Start: 2020-06-17 | End: 2020-06-23

## 2020-06-17 RX ORDER — DIAZEPAM 5 MG/1
5 TABLET ORAL NIGHTLY PRN
Status: DISCONTINUED | OUTPATIENT
Start: 2020-06-17 | End: 2020-06-24 | Stop reason: HOSPADM

## 2020-06-17 RX ORDER — MELATONIN
1000 DAILY
Status: DISCONTINUED | OUTPATIENT
Start: 2020-06-17 | End: 2020-06-24 | Stop reason: HOSPADM

## 2020-06-17 RX ORDER — ALBUTEROL SULFATE 2.5 MG/3ML
2.5 SOLUTION RESPIRATORY (INHALATION) EVERY 6 HOURS PRN
Status: DISCONTINUED | OUTPATIENT
Start: 2020-06-17 | End: 2020-06-22

## 2020-06-17 RX ADMIN — DIAZEPAM 5 MG: 5 TABLET ORAL at 22:59

## 2020-06-17 RX ADMIN — FLUTICASONE PROPIONATE 1 SPRAY: 50 SPRAY, METERED NASAL at 21:11

## 2020-06-17 RX ADMIN — VITAMIN D, TAB 1000IU (100/BT) 1000 UNITS: 25 TAB at 16:19

## 2020-06-17 RX ADMIN — DORZOLAMIDE HYDROCHLORIDE 1 DROP: 20 SOLUTION/ DROPS OPHTHALMIC at 21:10

## 2020-06-17 RX ADMIN — LEVOTHYROXINE SODIUM 75 MCG: 75 TABLET ORAL at 16:19

## 2020-06-17 RX ADMIN — FLUTICASONE PROPIONATE 1 SPRAY: 50 SPRAY, METERED NASAL at 16:20

## 2020-06-17 RX ADMIN — OXYCODONE HYDROCHLORIDE AND ACETAMINOPHEN 2 TABLET: 5; 325 TABLET ORAL at 21:10

## 2020-06-17 RX ADMIN — OXYCODONE HYDROCHLORIDE AND ACETAMINOPHEN 1 TABLET: 5; 325 TABLET ORAL at 11:57

## 2020-06-17 RX ADMIN — POLYETHYLENE GLYCOL 3350 17 G: 17 POWDER, FOR SOLUTION ORAL at 16:19

## 2020-06-17 RX ADMIN — TIMOLOL MALEATE 1 DROP: 5 SOLUTION, GEL FORMING, EXTENDED RELEASE OPHTHALMIC at 16:20

## 2020-06-17 RX ADMIN — OXYCODONE HYDROCHLORIDE AND ACETAMINOPHEN 1 TABLET: 5; 325 TABLET ORAL at 16:18

## 2020-06-17 RX ADMIN — FERROUS SULFATE TAB 325 MG (65 MG ELEMENTAL FE) 325 MG: 325 (65 FE) TAB at 16:19

## 2020-06-17 RX ADMIN — IPRATROPIUM BROMIDE 0.5 MG: 0.5 SOLUTION RESPIRATORY (INHALATION) at 11:21

## 2020-06-17 RX ADMIN — DORZOLAMIDE HYDROCHLORIDE 1 DROP: 20 SOLUTION/ DROPS OPHTHALMIC at 16:20

## 2020-06-17 RX ADMIN — LATANOPROST 1 DROP: 50 SOLUTION OPHTHALMIC at 21:10

## 2020-06-18 ENCOUNTER — APPOINTMENT (OUTPATIENT)
Dept: CARDIOLOGY | Facility: HOSPITAL | Age: 65
End: 2020-06-18

## 2020-06-18 PROBLEM — S72.22XA CLOSED DISPLACED SUBTROCHANTERIC FRACTURE OF LEFT FEMUR (HCC): Status: ACTIVE | Noted: 2020-06-16

## 2020-06-18 LAB
ABO GROUP BLD: NORMAL
ALBUMIN SERPL-MCNC: 2.9 G/DL (ref 3.5–5.2)
ALBUMIN/GLOB SERPL: 1 G/DL
ALP SERPL-CCNC: 67 U/L (ref 39–117)
ALT SERPL W P-5'-P-CCNC: 16 U/L (ref 1–33)
ANION GAP SERPL CALCULATED.3IONS-SCNC: 7.2 MMOL/L (ref 5–15)
ANTI-E: NORMAL
ANTI-FYA: NORMAL
ANTI-LITTLE C: NORMAL
AST SERPL-CCNC: 21 U/L (ref 1–32)
BASOPHILS # BLD AUTO: 0.04 10*3/MM3 (ref 0–0.2)
BASOPHILS NFR BLD AUTO: 0.4 % (ref 0–1.5)
BH CV LOWER VASCULAR LEFT COMMON FEMORAL AUGMENT: NORMAL
BH CV LOWER VASCULAR LEFT COMMON FEMORAL COMPETENT: NORMAL
BH CV LOWER VASCULAR LEFT COMMON FEMORAL COMPRESS: NORMAL
BH CV LOWER VASCULAR LEFT COMMON FEMORAL PHASIC: NORMAL
BH CV LOWER VASCULAR LEFT COMMON FEMORAL SPONT: NORMAL
BH CV LOWER VASCULAR LEFT DISTAL FEMORAL COMPRESS: NORMAL
BH CV LOWER VASCULAR LEFT GASTRONEMIUS COMPRESS: NORMAL
BH CV LOWER VASCULAR LEFT GREATER SAPH AK COMPRESS: NORMAL
BH CV LOWER VASCULAR LEFT GREATER SAPH BK COMPRESS: NORMAL
BH CV LOWER VASCULAR LEFT MID FEMORAL AUGMENT: NORMAL
BH CV LOWER VASCULAR LEFT MID FEMORAL COMPETENT: NORMAL
BH CV LOWER VASCULAR LEFT MID FEMORAL COMPRESS: NORMAL
BH CV LOWER VASCULAR LEFT MID FEMORAL PHASIC: NORMAL
BH CV LOWER VASCULAR LEFT MID FEMORAL SPONT: NORMAL
BH CV LOWER VASCULAR LEFT PERONEAL COMPRESS: NORMAL
BH CV LOWER VASCULAR LEFT POPLITEAL AUGMENT: NORMAL
BH CV LOWER VASCULAR LEFT POPLITEAL COMPETENT: NORMAL
BH CV LOWER VASCULAR LEFT POPLITEAL COMPRESS: NORMAL
BH CV LOWER VASCULAR LEFT POPLITEAL PHASIC: NORMAL
BH CV LOWER VASCULAR LEFT POPLITEAL SPONT: NORMAL
BH CV LOWER VASCULAR LEFT POSTERIOR TIBIAL COMPRESS: NORMAL
BH CV LOWER VASCULAR LEFT PROFUNDA FEMORAL COMPRESS: NORMAL
BH CV LOWER VASCULAR LEFT PROXIMAL FEMORAL COMPRESS: NORMAL
BH CV LOWER VASCULAR LEFT SAPHENOFEMORAL JUNCTION COMPRESS: NORMAL
BH CV LOWER VASCULAR RIGHT COMMON FEMORAL AUGMENT: NORMAL
BH CV LOWER VASCULAR RIGHT COMMON FEMORAL COMPETENT: NORMAL
BH CV LOWER VASCULAR RIGHT COMMON FEMORAL COMPRESS: NORMAL
BH CV LOWER VASCULAR RIGHT COMMON FEMORAL PHASIC: NORMAL
BH CV LOWER VASCULAR RIGHT COMMON FEMORAL SPONT: NORMAL
BH CV LOWER VASCULAR RIGHT DISTAL FEMORAL COMPRESS: NORMAL
BH CV LOWER VASCULAR RIGHT GASTRONEMIUS COMPRESS: NORMAL
BH CV LOWER VASCULAR RIGHT GREATER SAPH AK COMPRESS: NORMAL
BH CV LOWER VASCULAR RIGHT GREATER SAPH BK COMPRESS: NORMAL
BH CV LOWER VASCULAR RIGHT MID FEMORAL AUGMENT: NORMAL
BH CV LOWER VASCULAR RIGHT MID FEMORAL COMPETENT: NORMAL
BH CV LOWER VASCULAR RIGHT MID FEMORAL COMPRESS: NORMAL
BH CV LOWER VASCULAR RIGHT MID FEMORAL PHASIC: NORMAL
BH CV LOWER VASCULAR RIGHT MID FEMORAL SPONT: NORMAL
BH CV LOWER VASCULAR RIGHT PERONEAL COMPRESS: NORMAL
BH CV LOWER VASCULAR RIGHT POPLITEAL AUGMENT: NORMAL
BH CV LOWER VASCULAR RIGHT POPLITEAL COMPETENT: NORMAL
BH CV LOWER VASCULAR RIGHT POPLITEAL COMPRESS: NORMAL
BH CV LOWER VASCULAR RIGHT POPLITEAL PHASIC: NORMAL
BH CV LOWER VASCULAR RIGHT POPLITEAL SPONT: NORMAL
BH CV LOWER VASCULAR RIGHT POSTERIOR TIBIAL COMPRESS: NORMAL
BH CV LOWER VASCULAR RIGHT PROFUNDA FEMORAL COMPRESS: NORMAL
BH CV LOWER VASCULAR RIGHT PROXIMAL FEMORAL COMPRESS: NORMAL
BH CV LOWER VASCULAR RIGHT SAPHENOFEMORAL JUNCTION COMPRESS: NORMAL
BILIRUB SERPL-MCNC: 0.9 MG/DL (ref 0.2–1.2)
BLD GP AB SCN SERPL QL ELUTION: POSITIVE
BLD GP AB SCN SERPL QL: POSITIVE
BUN BLD-MCNC: 13 MG/DL (ref 8–23)
BUN/CREAT SERPL: 35.1 (ref 7–25)
CALCIUM SPEC-SCNC: 8.6 MG/DL (ref 8.6–10.5)
CHLORIDE SERPL-SCNC: 100 MMOL/L (ref 98–107)
CHOLEST SERPL-MCNC: 136 MG/DL (ref 0–200)
CO2 SERPL-SCNC: 26.8 MMOL/L (ref 22–29)
CREAT BLD-MCNC: 0.37 MG/DL (ref 0.57–1)
DAT C3: POSITIVE
DAT IGG-SP REAG RBC-IMP: POSITIVE
DAT POLY-SP REAG RBC QL: POSITIVE
DEPRECATED RDW RBC AUTO: 50.7 FL (ref 37–54)
EOSINOPHIL # BLD AUTO: 0.4 10*3/MM3 (ref 0–0.4)
EOSINOPHIL NFR BLD AUTO: 4.4 % (ref 0.3–6.2)
ERYTHROCYTE [DISTWIDTH] IN BLOOD BY AUTOMATED COUNT: 15.3 % (ref 12.3–15.4)
GFR SERPL CREATININE-BSD FRML MDRD: >150 ML/MIN/1.73
GLOBULIN UR ELPH-MCNC: 3 GM/DL
GLUCOSE BLD-MCNC: 91 MG/DL (ref 65–99)
HBA1C MFR BLD: 4.8 % (ref 4.8–5.6)
HCT VFR BLD AUTO: 24.8 % (ref 34–46.6)
HDLC SERPL-MCNC: 44 MG/DL (ref 40–60)
HGB BLD-MCNC: 8.2 G/DL (ref 12–15.9)
IMM GRANULOCYTES # BLD AUTO: 0.19 10*3/MM3 (ref 0–0.05)
IMM GRANULOCYTES NFR BLD AUTO: 2.1 % (ref 0–0.5)
LDLC SERPL CALC-MCNC: 78 MG/DL (ref 0–100)
LDLC/HDLC SERPL: 1.78 {RATIO}
LYMPHOCYTES # BLD AUTO: 1.49 10*3/MM3 (ref 0.7–3.1)
LYMPHOCYTES NFR BLD AUTO: 16.6 % (ref 19.6–45.3)
MCH RBC QN AUTO: 30.1 PG (ref 26.6–33)
MCHC RBC AUTO-ENTMCNC: 33.1 G/DL (ref 31.5–35.7)
MCV RBC AUTO: 91.2 FL (ref 79–97)
MONOCYTES # BLD AUTO: 1.11 10*3/MM3 (ref 0.1–0.9)
MONOCYTES NFR BLD AUTO: 12.3 % (ref 5–12)
NEUTROPHILS # BLD AUTO: 5.76 10*3/MM3 (ref 1.7–7)
NEUTROPHILS NFR BLD AUTO: 64.2 % (ref 42.7–76)
NRBC BLD AUTO-RTO: 0 /100 WBC (ref 0–0.2)
NT-PROBNP SERPL-MCNC: 1098 PG/ML (ref 5–900)
PLATELET # BLD AUTO: 393 10*3/MM3 (ref 140–450)
PMV BLD AUTO: 8.6 FL (ref 6–12)
POTASSIUM BLD-SCNC: 3.9 MMOL/L (ref 3.5–5.2)
PROT SERPL-MCNC: 5.9 G/DL (ref 6–8.5)
RBC # BLD AUTO: 2.72 10*6/MM3 (ref 3.77–5.28)
RH BLD: POSITIVE
SODIUM BLD-SCNC: 134 MMOL/L (ref 136–145)
T&S EXPIRATION DATE: NORMAL
TRIGL SERPL-MCNC: 69 MG/DL (ref 0–150)
TSH SERPL DL<=0.05 MIU/L-ACNC: 2.69 UIU/ML (ref 0.27–4.2)
VLDLC SERPL-MCNC: 13.8 MG/DL (ref 5–40)
WBC NRBC COR # BLD: 8.99 10*3/MM3 (ref 3.4–10.8)

## 2020-06-18 PROCEDURE — 86901 BLOOD TYPING SEROLOGIC RH(D): CPT

## 2020-06-18 PROCEDURE — 86870 RBC ANTIBODY IDENTIFICATION: CPT | Performed by: ORTHOPAEDIC SURGERY

## 2020-06-18 PROCEDURE — 86922 COMPATIBILITY TEST ANTIGLOB: CPT

## 2020-06-18 PROCEDURE — 36430 TRANSFUSION BLD/BLD COMPNT: CPT

## 2020-06-18 PROCEDURE — 93970 EXTREMITY STUDY: CPT

## 2020-06-18 PROCEDURE — 86901 BLOOD TYPING SEROLOGIC RH(D): CPT | Performed by: ORTHOPAEDIC SURGERY

## 2020-06-18 PROCEDURE — 94799 UNLISTED PULMONARY SVC/PX: CPT

## 2020-06-18 PROCEDURE — 86860 RBC ANTIBODY ELUTION: CPT | Performed by: ORTHOPAEDIC SURGERY

## 2020-06-18 PROCEDURE — 86900 BLOOD TYPING SEROLOGIC ABO: CPT

## 2020-06-18 PROCEDURE — 83036 HEMOGLOBIN GLYCOSYLATED A1C: CPT | Performed by: HOSPITALIST

## 2020-06-18 PROCEDURE — 84443 ASSAY THYROID STIM HORMONE: CPT | Performed by: HOSPITALIST

## 2020-06-18 PROCEDURE — P9016 RBC LEUKOCYTES REDUCED: HCPCS

## 2020-06-18 PROCEDURE — 86850 RBC ANTIBODY SCREEN: CPT | Performed by: ORTHOPAEDIC SURGERY

## 2020-06-18 PROCEDURE — 86920 COMPATIBILITY TEST SPIN: CPT

## 2020-06-18 PROCEDURE — 86880 COOMBS TEST DIRECT: CPT | Performed by: ORTHOPAEDIC SURGERY

## 2020-06-18 PROCEDURE — 86900 BLOOD TYPING SEROLOGIC ABO: CPT | Performed by: ORTHOPAEDIC SURGERY

## 2020-06-18 PROCEDURE — 86902 BLOOD TYPE ANTIGEN DONOR EA: CPT

## 2020-06-18 PROCEDURE — 85025 COMPLETE CBC W/AUTO DIFF WBC: CPT | Performed by: HOSPITALIST

## 2020-06-18 PROCEDURE — 86870 RBC ANTIBODY IDENTIFICATION: CPT

## 2020-06-18 PROCEDURE — 83880 ASSAY OF NATRIURETIC PEPTIDE: CPT | Performed by: HOSPITALIST

## 2020-06-18 PROCEDURE — 25010000002 ENOXAPARIN PER 10 MG: Performed by: NURSE PRACTITIONER

## 2020-06-18 PROCEDURE — 80053 COMPREHEN METABOLIC PANEL: CPT | Performed by: HOSPITALIST

## 2020-06-18 PROCEDURE — 80061 LIPID PANEL: CPT | Performed by: HOSPITALIST

## 2020-06-18 RX ADMIN — IPRATROPIUM BROMIDE 0.5 MG: 0.5 SOLUTION RESPIRATORY (INHALATION) at 12:07

## 2020-06-18 RX ADMIN — LEVOTHYROXINE SODIUM 75 MCG: 75 TABLET ORAL at 08:45

## 2020-06-18 RX ADMIN — LATANOPROST 1 DROP: 50 SOLUTION OPHTHALMIC at 21:44

## 2020-06-18 RX ADMIN — IPRATROPIUM BROMIDE 0.5 MG: 0.5 SOLUTION RESPIRATORY (INHALATION) at 21:08

## 2020-06-18 RX ADMIN — OXYCODONE HYDROCHLORIDE AND ACETAMINOPHEN 1 TABLET: 5; 325 TABLET ORAL at 22:11

## 2020-06-18 RX ADMIN — FERROUS SULFATE TAB 325 MG (65 MG ELEMENTAL FE) 325 MG: 325 (65 FE) TAB at 08:45

## 2020-06-18 RX ADMIN — TIMOLOL MALEATE 1 DROP: 5 SOLUTION, GEL FORMING, EXTENDED RELEASE OPHTHALMIC at 08:45

## 2020-06-18 RX ADMIN — OXYCODONE HYDROCHLORIDE AND ACETAMINOPHEN 1 TABLET: 5; 325 TABLET ORAL at 12:44

## 2020-06-18 RX ADMIN — OXYCODONE HYDROCHLORIDE AND ACETAMINOPHEN 1 TABLET: 5; 325 TABLET ORAL at 18:06

## 2020-06-18 RX ADMIN — FLUTICASONE PROPIONATE 1 SPRAY: 50 SPRAY, METERED NASAL at 22:11

## 2020-06-18 RX ADMIN — VITAMIN D, TAB 1000IU (100/BT) 1000 UNITS: 25 TAB at 08:45

## 2020-06-18 RX ADMIN — DORZOLAMIDE HYDROCHLORIDE 1 DROP: 20 SOLUTION/ DROPS OPHTHALMIC at 22:12

## 2020-06-18 RX ADMIN — PANTOPRAZOLE SODIUM 40 MG: 40 TABLET, DELAYED RELEASE ORAL at 08:45

## 2020-06-18 RX ADMIN — ENOXAPARIN SODIUM 40 MG: 40 INJECTION SUBCUTANEOUS at 08:52

## 2020-06-18 RX ADMIN — FLUTICASONE PROPIONATE 1 SPRAY: 50 SPRAY, METERED NASAL at 08:45

## 2020-06-18 RX ADMIN — DORZOLAMIDE HYDROCHLORIDE 1 DROP: 20 SOLUTION/ DROPS OPHTHALMIC at 08:45

## 2020-06-18 RX ADMIN — POLYETHYLENE GLYCOL 3350 17 G: 17 POWDER, FOR SOLUTION ORAL at 08:45

## 2020-06-19 ENCOUNTER — ANESTHESIA EVENT (OUTPATIENT)
Dept: PERIOP | Facility: HOSPITAL | Age: 65
End: 2020-06-19

## 2020-06-19 ENCOUNTER — APPOINTMENT (OUTPATIENT)
Dept: GENERAL RADIOLOGY | Facility: HOSPITAL | Age: 65
End: 2020-06-19

## 2020-06-19 ENCOUNTER — ANESTHESIA (OUTPATIENT)
Dept: PERIOP | Facility: HOSPITAL | Age: 65
End: 2020-06-19

## 2020-06-19 LAB
ANION GAP SERPL CALCULATED.3IONS-SCNC: 8.3 MMOL/L (ref 5–15)
BASOPHILS # BLD AUTO: 0.05 10*3/MM3 (ref 0–0.2)
BASOPHILS NFR BLD AUTO: 0.6 % (ref 0–1.5)
BUN BLD-MCNC: 11 MG/DL (ref 8–23)
BUN/CREAT SERPL: 32.4 (ref 7–25)
CALCIUM SPEC-SCNC: 8.7 MG/DL (ref 8.6–10.5)
CHLORIDE SERPL-SCNC: 101 MMOL/L (ref 98–107)
CO2 SERPL-SCNC: 25.7 MMOL/L (ref 22–29)
CREAT BLD-MCNC: 0.34 MG/DL (ref 0.57–1)
DEPRECATED RDW RBC AUTO: 54.1 FL (ref 37–54)
EOSINOPHIL # BLD AUTO: 0.42 10*3/MM3 (ref 0–0.4)
EOSINOPHIL NFR BLD AUTO: 5.3 % (ref 0.3–6.2)
ERYTHROCYTE [DISTWIDTH] IN BLOOD BY AUTOMATED COUNT: 16.2 % (ref 12.3–15.4)
GFR SERPL CREATININE-BSD FRML MDRD: >150 ML/MIN/1.73
GLUCOSE BLD-MCNC: 80 MG/DL (ref 65–99)
HCT VFR BLD AUTO: 35.1 % (ref 34–46.6)
HGB BLD-MCNC: 11.5 G/DL (ref 12–15.9)
IMM GRANULOCYTES # BLD AUTO: 0.12 10*3/MM3 (ref 0–0.05)
IMM GRANULOCYTES NFR BLD AUTO: 1.5 % (ref 0–0.5)
LYMPHOCYTES # BLD AUTO: 1.29 10*3/MM3 (ref 0.7–3.1)
LYMPHOCYTES NFR BLD AUTO: 16.2 % (ref 19.6–45.3)
MCH RBC QN AUTO: 29.6 PG (ref 26.6–33)
MCHC RBC AUTO-ENTMCNC: 32.8 G/DL (ref 31.5–35.7)
MCV RBC AUTO: 90.5 FL (ref 79–97)
MONOCYTES # BLD AUTO: 0.88 10*3/MM3 (ref 0.1–0.9)
MONOCYTES NFR BLD AUTO: 11.1 % (ref 5–12)
NEUTROPHILS # BLD AUTO: 5.18 10*3/MM3 (ref 1.7–7)
NEUTROPHILS NFR BLD AUTO: 65.3 % (ref 42.7–76)
NRBC BLD AUTO-RTO: 0 /100 WBC (ref 0–0.2)
PLATELET # BLD AUTO: 415 10*3/MM3 (ref 140–450)
PMV BLD AUTO: 8.5 FL (ref 6–12)
POTASSIUM BLD-SCNC: 3.9 MMOL/L (ref 3.5–5.2)
RBC # BLD AUTO: 3.88 10*6/MM3 (ref 3.77–5.28)
SODIUM BLD-SCNC: 135 MMOL/L (ref 136–145)
WBC NRBC COR # BLD: 7.94 10*3/MM3 (ref 3.4–10.8)

## 2020-06-19 PROCEDURE — C1713 ANCHOR/SCREW BN/BN,TIS/BN: HCPCS | Performed by: ORTHOPAEDIC SURGERY

## 2020-06-19 PROCEDURE — 76000 FLUOROSCOPY <1 HR PHYS/QHP: CPT

## 2020-06-19 PROCEDURE — 85025 COMPLETE CBC W/AUTO DIFF WBC: CPT | Performed by: ORTHOPAEDIC SURGERY

## 2020-06-19 PROCEDURE — 80048 BASIC METABOLIC PNL TOTAL CA: CPT | Performed by: ORTHOPAEDIC SURGERY

## 2020-06-19 PROCEDURE — 36430 TRANSFUSION BLD/BLD COMPNT: CPT

## 2020-06-19 PROCEDURE — 25010000002 HYDROMORPHONE PER 4 MG: Performed by: ANESTHESIOLOGY

## 2020-06-19 PROCEDURE — 99222 1ST HOSP IP/OBS MODERATE 55: CPT | Performed by: ORTHOPAEDIC SURGERY

## 2020-06-19 PROCEDURE — 25010000002 VANCOMYCIN PER 500 MG: Performed by: ORTHOPAEDIC SURGERY

## 2020-06-19 PROCEDURE — L1830 KO IMMOB CANVAS LONG PRE OTS: HCPCS | Performed by: ORTHOPAEDIC SURGERY

## 2020-06-19 PROCEDURE — 25010000003 CEFAZOLIN IN DEXTROSE 2-4 GM/100ML-% SOLUTION: Performed by: ORTHOPAEDIC SURGERY

## 2020-06-19 PROCEDURE — 25010000002 PHENYLEPHRINE PER 1 ML: Performed by: ANESTHESIOLOGY

## 2020-06-19 PROCEDURE — 73551 X-RAY EXAM OF FEMUR 1: CPT

## 2020-06-19 PROCEDURE — C1769 GUIDE WIRE: HCPCS | Performed by: ORTHOPAEDIC SURGERY

## 2020-06-19 PROCEDURE — 0QS704Z REPOSITION LEFT UPPER FEMUR WITH INTERNAL FIXATION DEVICE, OPEN APPROACH: ICD-10-PCS | Performed by: ORTHOPAEDIC SURGERY

## 2020-06-19 PROCEDURE — 25010000002 FENTANYL CITRATE (PF) 100 MCG/2ML SOLUTION: Performed by: ANESTHESIOLOGY

## 2020-06-19 PROCEDURE — 25010000002 NEOSTIGMINE PER 0.5 MG: Performed by: ANESTHESIOLOGY

## 2020-06-19 PROCEDURE — P9016 RBC LEUKOCYTES REDUCED: HCPCS

## 2020-06-19 PROCEDURE — 25010000002 PROPOFOL 10 MG/ML EMULSION: Performed by: ANESTHESIOLOGY

## 2020-06-19 PROCEDURE — 86900 BLOOD TYPING SEROLOGIC ABO: CPT

## 2020-06-19 DEVICE — SCRW LK S/TAP STRDRV 5X36MM: Type: IMPLANTABLE DEVICE | Site: HIP | Status: FUNCTIONAL

## 2020-06-19 DEVICE — IMPLANTABLE DEVICE: Type: IMPLANTABLE DEVICE | Site: HIP | Status: FUNCTIONAL

## 2020-06-19 DEVICE — SCRW LK S/TAP STRDRV 5X32MM: Type: IMPLANTABLE DEVICE | Site: HIP | Status: FUNCTIONAL

## 2020-06-19 DEVICE — SCRW CORT S/TAP 4.5X36MM: Type: IMPLANTABLE DEVICE | Site: HIP | Status: FUNCTIONAL

## 2020-06-19 DEVICE — SCRW LK S/TAP STRDRV 5X30MM: Type: IMPLANTABLE DEVICE | Site: HIP | Status: FUNCTIONAL

## 2020-06-19 DEVICE — BONE FILL NORIAN INJ/DRL 5CC STRL: Type: IMPLANTABLE DEVICE | Site: HIP | Status: FUNCTIONAL

## 2020-06-19 RX ORDER — PROMETHAZINE HYDROCHLORIDE 25 MG/1
25 TABLET ORAL ONCE AS NEEDED
Status: DISCONTINUED | OUTPATIENT
Start: 2020-06-19 | End: 2020-06-19 | Stop reason: HOSPADM

## 2020-06-19 RX ORDER — CEFAZOLIN SODIUM 2 G/100ML
2 INJECTION, SOLUTION INTRAVENOUS ONCE
Status: COMPLETED | OUTPATIENT
Start: 2020-06-19 | End: 2020-06-19

## 2020-06-19 RX ORDER — MIDAZOLAM HYDROCHLORIDE 1 MG/ML
1 INJECTION INTRAMUSCULAR; INTRAVENOUS
Status: DISCONTINUED | OUTPATIENT
Start: 2020-06-19 | End: 2020-06-19 | Stop reason: HOSPADM

## 2020-06-19 RX ORDER — FENTANYL CITRATE 50 UG/ML
50 INJECTION, SOLUTION INTRAMUSCULAR; INTRAVENOUS
Status: DISCONTINUED | OUTPATIENT
Start: 2020-06-19 | End: 2020-06-19 | Stop reason: HOSPADM

## 2020-06-19 RX ORDER — FENTANYL CITRATE 50 UG/ML
INJECTION, SOLUTION INTRAMUSCULAR; INTRAVENOUS AS NEEDED
Status: DISCONTINUED | OUTPATIENT
Start: 2020-06-19 | End: 2020-06-19 | Stop reason: SURG

## 2020-06-19 RX ORDER — PROMETHAZINE HYDROCHLORIDE 25 MG/1
25 SUPPOSITORY RECTAL ONCE AS NEEDED
Status: DISCONTINUED | OUTPATIENT
Start: 2020-06-19 | End: 2020-06-19 | Stop reason: HOSPADM

## 2020-06-19 RX ORDER — LIDOCAINE HYDROCHLORIDE 20 MG/ML
INJECTION, SOLUTION INFILTRATION; PERINEURAL AS NEEDED
Status: DISCONTINUED | OUTPATIENT
Start: 2020-06-19 | End: 2020-06-19 | Stop reason: SURG

## 2020-06-19 RX ORDER — ROCURONIUM BROMIDE 10 MG/ML
INJECTION, SOLUTION INTRAVENOUS AS NEEDED
Status: DISCONTINUED | OUTPATIENT
Start: 2020-06-19 | End: 2020-06-19 | Stop reason: SURG

## 2020-06-19 RX ORDER — VANCOMYCIN HYDROCHLORIDE 1 G/200ML
1000 INJECTION, SOLUTION INTRAVENOUS ONCE
Status: COMPLETED | OUTPATIENT
Start: 2020-06-19 | End: 2020-06-19

## 2020-06-19 RX ORDER — GLYCOPYRROLATE 0.2 MG/ML
INJECTION INTRAMUSCULAR; INTRAVENOUS AS NEEDED
Status: DISCONTINUED | OUTPATIENT
Start: 2020-06-19 | End: 2020-06-19 | Stop reason: SURG

## 2020-06-19 RX ORDER — OXYCODONE AND ACETAMINOPHEN 7.5; 325 MG/1; MG/1
1 TABLET ORAL ONCE AS NEEDED
Status: DISCONTINUED | OUTPATIENT
Start: 2020-06-19 | End: 2020-06-19 | Stop reason: HOSPADM

## 2020-06-19 RX ORDER — CEFAZOLIN SODIUM 2 G/100ML
2 INJECTION, SOLUTION INTRAVENOUS EVERY 8 HOURS
Status: COMPLETED | OUTPATIENT
Start: 2020-06-20 | End: 2020-06-20

## 2020-06-19 RX ORDER — SODIUM CHLORIDE 9 MG/ML
INJECTION, SOLUTION INTRAVENOUS AS NEEDED
Status: DISCONTINUED | OUTPATIENT
Start: 2020-06-19 | End: 2020-06-19 | Stop reason: HOSPADM

## 2020-06-19 RX ORDER — HYDROMORPHONE HYDROCHLORIDE 1 MG/ML
0.5 INJECTION, SOLUTION INTRAMUSCULAR; INTRAVENOUS; SUBCUTANEOUS
Status: DISCONTINUED | OUTPATIENT
Start: 2020-06-19 | End: 2020-06-19 | Stop reason: HOSPADM

## 2020-06-19 RX ORDER — PROMETHAZINE HYDROCHLORIDE 25 MG/ML
6.25 INJECTION, SOLUTION INTRAMUSCULAR; INTRAVENOUS ONCE AS NEEDED
Status: DISCONTINUED | OUTPATIENT
Start: 2020-06-19 | End: 2020-06-19 | Stop reason: HOSPADM

## 2020-06-19 RX ORDER — FAMOTIDINE 10 MG/ML
20 INJECTION, SOLUTION INTRAVENOUS ONCE
Status: COMPLETED | OUTPATIENT
Start: 2020-06-19 | End: 2020-06-19

## 2020-06-19 RX ORDER — SODIUM CHLORIDE 0.9 % (FLUSH) 0.9 %
3 SYRINGE (ML) INJECTION EVERY 12 HOURS SCHEDULED
Status: DISCONTINUED | OUTPATIENT
Start: 2020-06-19 | End: 2020-06-19 | Stop reason: HOSPADM

## 2020-06-19 RX ORDER — SODIUM CHLORIDE 0.9 % (FLUSH) 0.9 %
3-10 SYRINGE (ML) INJECTION AS NEEDED
Status: DISCONTINUED | OUTPATIENT
Start: 2020-06-19 | End: 2020-06-19 | Stop reason: HOSPADM

## 2020-06-19 RX ORDER — FLUMAZENIL 0.1 MG/ML
0.2 INJECTION INTRAVENOUS AS NEEDED
Status: DISCONTINUED | OUTPATIENT
Start: 2020-06-19 | End: 2020-06-19 | Stop reason: HOSPADM

## 2020-06-19 RX ORDER — ONDANSETRON 2 MG/ML
4 INJECTION INTRAMUSCULAR; INTRAVENOUS ONCE AS NEEDED
Status: DISCONTINUED | OUTPATIENT
Start: 2020-06-19 | End: 2020-06-19 | Stop reason: HOSPADM

## 2020-06-19 RX ORDER — HYDRALAZINE HYDROCHLORIDE 20 MG/ML
5 INJECTION INTRAMUSCULAR; INTRAVENOUS
Status: DISCONTINUED | OUTPATIENT
Start: 2020-06-19 | End: 2020-06-19 | Stop reason: HOSPADM

## 2020-06-19 RX ORDER — LIDOCAINE HYDROCHLORIDE 10 MG/ML
0.5 INJECTION, SOLUTION EPIDURAL; INFILTRATION; INTRACAUDAL; PERINEURAL ONCE AS NEEDED
Status: DISCONTINUED | OUTPATIENT
Start: 2020-06-19 | End: 2020-06-19 | Stop reason: HOSPADM

## 2020-06-19 RX ORDER — EPHEDRINE SULFATE 50 MG/ML
5 INJECTION, SOLUTION INTRAVENOUS ONCE AS NEEDED
Status: DISCONTINUED | OUTPATIENT
Start: 2020-06-19 | End: 2020-06-19 | Stop reason: HOSPADM

## 2020-06-19 RX ORDER — SODIUM CHLORIDE, SODIUM LACTATE, POTASSIUM CHLORIDE, CALCIUM CHLORIDE 600; 310; 30; 20 MG/100ML; MG/100ML; MG/100ML; MG/100ML
9 INJECTION, SOLUTION INTRAVENOUS CONTINUOUS
Status: DISCONTINUED | OUTPATIENT
Start: 2020-06-19 | End: 2020-06-19

## 2020-06-19 RX ORDER — PROPOFOL 10 MG/ML
VIAL (ML) INTRAVENOUS AS NEEDED
Status: DISCONTINUED | OUTPATIENT
Start: 2020-06-19 | End: 2020-06-19 | Stop reason: SURG

## 2020-06-19 RX ORDER — HYDROCODONE BITARTRATE AND ACETAMINOPHEN 7.5; 325 MG/1; MG/1
1 TABLET ORAL ONCE AS NEEDED
Status: DISCONTINUED | OUTPATIENT
Start: 2020-06-19 | End: 2020-06-19 | Stop reason: HOSPADM

## 2020-06-19 RX ADMIN — OXYCODONE HYDROCHLORIDE AND ACETAMINOPHEN 1 TABLET: 5; 325 TABLET ORAL at 10:13

## 2020-06-19 RX ADMIN — FENTANYL CITRATE 50 MCG: 50 INJECTION, SOLUTION INTRAMUSCULAR; INTRAVENOUS at 21:06

## 2020-06-19 RX ADMIN — OXYCODONE HYDROCHLORIDE AND ACETAMINOPHEN 1 TABLET: 5; 325 TABLET ORAL at 06:26

## 2020-06-19 RX ADMIN — DORZOLAMIDE HYDROCHLORIDE 1 DROP: 20 SOLUTION/ DROPS OPHTHALMIC at 08:56

## 2020-06-19 RX ADMIN — GLYCOPYRROLATE 0.6 MG: 0.2 INJECTION INTRAMUSCULAR; INTRAVENOUS at 20:16

## 2020-06-19 RX ADMIN — HYDROMORPHONE HYDROCHLORIDE 0.5 MG: 1 INJECTION, SOLUTION INTRAMUSCULAR; INTRAVENOUS; SUBCUTANEOUS at 21:16

## 2020-06-19 RX ADMIN — FLUTICASONE PROPIONATE 1 SPRAY: 50 SPRAY, METERED NASAL at 08:56

## 2020-06-19 RX ADMIN — FERROUS SULFATE TAB 325 MG (65 MG ELEMENTAL FE) 325 MG: 325 (65 FE) TAB at 08:56

## 2020-06-19 RX ADMIN — LEVOTHYROXINE SODIUM 75 MCG: 75 TABLET ORAL at 06:26

## 2020-06-19 RX ADMIN — CEFAZOLIN SODIUM 2 G: 2 INJECTION, SOLUTION INTRAVENOUS at 18:04

## 2020-06-19 RX ADMIN — PHENYLEPHRINE HYDROCHLORIDE 100 MCG: 10 INJECTION INTRAVENOUS at 18:36

## 2020-06-19 RX ADMIN — VANCOMYCIN HYDROCHLORIDE 1000 MG: 1 INJECTION, SOLUTION INTRAVENOUS at 16:14

## 2020-06-19 RX ADMIN — FENTANYL CITRATE 50 MCG: 50 INJECTION INTRAMUSCULAR; INTRAVENOUS at 19:35

## 2020-06-19 RX ADMIN — FENTANYL CITRATE 50 MCG: 50 INJECTION, SOLUTION INTRAMUSCULAR; INTRAVENOUS at 20:53

## 2020-06-19 RX ADMIN — POLYETHYLENE GLYCOL 3350 17 G: 17 POWDER, FOR SOLUTION ORAL at 08:56

## 2020-06-19 RX ADMIN — FENTANYL CITRATE 50 MCG: 50 INJECTION INTRAMUSCULAR; INTRAVENOUS at 20:16

## 2020-06-19 RX ADMIN — LIDOCAINE HYDROCHLORIDE 60 MG: 20 INJECTION, SOLUTION INFILTRATION; PERINEURAL at 18:05

## 2020-06-19 RX ADMIN — PANTOPRAZOLE SODIUM 40 MG: 40 TABLET, DELAYED RELEASE ORAL at 06:26

## 2020-06-19 RX ADMIN — ROCURONIUM BROMIDE 20 MG: 10 INJECTION, SOLUTION INTRAVENOUS at 18:48

## 2020-06-19 RX ADMIN — SODIUM CHLORIDE, POTASSIUM CHLORIDE, SODIUM LACTATE AND CALCIUM CHLORIDE: 600; 310; 30; 20 INJECTION, SOLUTION INTRAVENOUS at 18:05

## 2020-06-19 RX ADMIN — PHENYLEPHRINE HYDROCHLORIDE 100 MCG: 10 INJECTION INTRAVENOUS at 18:27

## 2020-06-19 RX ADMIN — NEOSTIGMINE METHYLSULFATE 3 MG: 1 INJECTION INTRAMUSCULAR; INTRAVENOUS; SUBCUTANEOUS at 20:16

## 2020-06-19 RX ADMIN — VITAMIN D, TAB 1000IU (100/BT) 1000 UNITS: 25 TAB at 08:56

## 2020-06-19 RX ADMIN — ROCURONIUM BROMIDE 30 MG: 10 INJECTION, SOLUTION INTRAVENOUS at 18:10

## 2020-06-19 RX ADMIN — PROPOFOL 150 MG: 10 INJECTION, EMULSION INTRAVENOUS at 18:08

## 2020-06-19 RX ADMIN — TIMOLOL MALEATE 1 DROP: 5 SOLUTION, GEL FORMING, EXTENDED RELEASE OPHTHALMIC at 08:56

## 2020-06-19 RX ADMIN — FAMOTIDINE 20 MG: 10 INJECTION, SOLUTION INTRAVENOUS at 16:36

## 2020-06-19 RX ADMIN — SODIUM CHLORIDE, POTASSIUM CHLORIDE, SODIUM LACTATE AND CALCIUM CHLORIDE: 600; 310; 30; 20 INJECTION, SOLUTION INTRAVENOUS at 20:00

## 2020-06-19 NOTE — ANESTHESIA PREPROCEDURE EVALUATION
Anesthesia Evaluation     Patient summary reviewed and Nursing notes reviewed   history of anesthetic complications: PONV prolonged sedation               Airway   Mallampati: II  TM distance: >3 FB  Neck ROM: full  no difficulty expected  Dental - normal exam   (+) poor dentition    Pulmonary     breath sounds clear to auscultation  (+) a smoker Former,   (-) shortness of breath, sleep apnea, decreased breath sounds, wheezes  Cardiovascular - normal exam  Exercise tolerance: good (4-7 METS)    Rhythm: regular  Rate: normal    (-) past MI, angina, CHF, orthopnea, PND, MARIA, PVD      Neuro/Psych  (+) psychiatric history Anxiety,     (-) seizures, neuromuscular disease, TIA, CVA, dizziness/light headedness, weakness, numbness    ROS Comment: Right eye visual deficit    GI/Hepatic/Renal/Endo    (+)   thyroid problem hypothyroidism  (-) liver disease, diabetes    Musculoskeletal     (+) gait problem, joint swelling,   Abdominal  - normal exam   Substance History - negative use  (-) alcohol use, drug use     OB/GYN negative ob/gyn ROS         Other   arthritis,                      Anesthesia Plan    ASA 3 - emergent     general   (I discussed with the patient the relevant risks of general anesthesia including, but not limited to, nausea, vomiting, disorientation, post-op delirium, nerve injury, oral/dental injury, awareness, stroke, and death.  I also reviewed any clinically relevant lab and imaging results.)  intravenous induction     Anesthetic plan, all risks, benefits, and alternatives have been provided, discussed and informed consent has been obtained with: patient.    Plan discussed with CRNA.

## 2020-06-19 NOTE — ANESTHESIA PROCEDURE NOTES
Airway  Urgency: elective    Date/Time: 6/19/2020 6:11 PM  Airway not difficult    General Information and Staff    Patient location during procedure: OR  Anesthesiologist: Mikie Oneill MD    Indications and Patient Condition  Indications for airway management: airway protection    Preoxygenated: yes  Mask difficulty assessment: 1 - vent by mask    Final Airway Details  Final airway type: endotracheal airway      Successful airway: ETT  Cuffed: yes   Successful intubation technique: direct laryngoscopy  Endotracheal tube insertion site: oral  Blade: Caballero  Blade size: 2  ETT size (mm): 7.0  Cormack-Lehane Classification: grade I - full view of glottis  Placement verified by: chest auscultation and capnometry   Measured from: teeth  ETT/EBT  to teeth (cm): 19  Number of attempts at approach: 1    Additional Comments  Pre oxygenated  Easy mask vent  Atraumatic intubation  + etco2  Breath sounds equal bilaterally

## 2020-06-20 ENCOUNTER — APPOINTMENT (OUTPATIENT)
Dept: GENERAL RADIOLOGY | Facility: HOSPITAL | Age: 65
End: 2020-06-20

## 2020-06-20 LAB
ANION GAP SERPL CALCULATED.3IONS-SCNC: 8.6 MMOL/L (ref 5–15)
BASOPHILS # BLD AUTO: 0.05 10*3/MM3 (ref 0–0.2)
BASOPHILS NFR BLD AUTO: 0.4 % (ref 0–1.5)
BUN BLD-MCNC: 12 MG/DL (ref 8–23)
BUN/CREAT SERPL: 40 (ref 7–25)
CALCIUM SPEC-SCNC: 8.2 MG/DL (ref 8.6–10.5)
CHLORIDE SERPL-SCNC: 97 MMOL/L (ref 98–107)
CO2 SERPL-SCNC: 24.4 MMOL/L (ref 22–29)
CREAT BLD-MCNC: 0.3 MG/DL (ref 0.57–1)
DEPRECATED RDW RBC AUTO: 53.4 FL (ref 37–54)
EOSINOPHIL # BLD AUTO: 0.08 10*3/MM3 (ref 0–0.4)
EOSINOPHIL NFR BLD AUTO: 0.6 % (ref 0.3–6.2)
ERYTHROCYTE [DISTWIDTH] IN BLOOD BY AUTOMATED COUNT: 15.9 % (ref 12.3–15.4)
GFR SERPL CREATININE-BSD FRML MDRD: >150 ML/MIN/1.73
GLUCOSE BLD-MCNC: 123 MG/DL (ref 65–99)
HCT VFR BLD AUTO: 31.9 % (ref 34–46.6)
HGB BLD-MCNC: 10.5 G/DL (ref 12–15.9)
IMM GRANULOCYTES # BLD AUTO: 0.15 10*3/MM3 (ref 0–0.05)
IMM GRANULOCYTES NFR BLD AUTO: 1.1 % (ref 0–0.5)
LYMPHOCYTES # BLD AUTO: 0.88 10*3/MM3 (ref 0.7–3.1)
LYMPHOCYTES NFR BLD AUTO: 6.5 % (ref 19.6–45.3)
MCH RBC QN AUTO: 29.7 PG (ref 26.6–33)
MCHC RBC AUTO-ENTMCNC: 32.9 G/DL (ref 31.5–35.7)
MCV RBC AUTO: 90.1 FL (ref 79–97)
MONOCYTES # BLD AUTO: 1.38 10*3/MM3 (ref 0.1–0.9)
MONOCYTES NFR BLD AUTO: 10.2 % (ref 5–12)
NEUTROPHILS # BLD AUTO: 11.05 10*3/MM3 (ref 1.7–7)
NEUTROPHILS NFR BLD AUTO: 81.2 % (ref 42.7–76)
NRBC BLD AUTO-RTO: 0 /100 WBC (ref 0–0.2)
PLATELET # BLD AUTO: 423 10*3/MM3 (ref 140–450)
PMV BLD AUTO: 8.2 FL (ref 6–12)
POTASSIUM BLD-SCNC: 4 MMOL/L (ref 3.5–5.2)
RBC # BLD AUTO: 3.54 10*6/MM3 (ref 3.77–5.28)
SODIUM BLD-SCNC: 130 MMOL/L (ref 136–145)
WBC NRBC COR # BLD: 13.59 10*3/MM3 (ref 3.4–10.8)

## 2020-06-20 PROCEDURE — 25010000002 ONDANSETRON PER 1 MG: Performed by: HOSPITALIST

## 2020-06-20 PROCEDURE — 25010000002 ENOXAPARIN PER 10 MG: Performed by: ORTHOPAEDIC SURGERY

## 2020-06-20 PROCEDURE — 25010000003 CEFAZOLIN IN DEXTROSE 2-4 GM/100ML-% SOLUTION: Performed by: ORTHOPAEDIC SURGERY

## 2020-06-20 PROCEDURE — 85025 COMPLETE CBC W/AUTO DIFF WBC: CPT | Performed by: ORTHOPAEDIC SURGERY

## 2020-06-20 PROCEDURE — 97162 PT EVAL MOD COMPLEX 30 MIN: CPT

## 2020-06-20 PROCEDURE — 93005 ELECTROCARDIOGRAM TRACING: CPT | Performed by: HOSPITALIST

## 2020-06-20 PROCEDURE — 97110 THERAPEUTIC EXERCISES: CPT

## 2020-06-20 PROCEDURE — 73502 X-RAY EXAM HIP UNI 2-3 VIEWS: CPT

## 2020-06-20 PROCEDURE — 93010 ELECTROCARDIOGRAM REPORT: CPT | Performed by: INTERNAL MEDICINE

## 2020-06-20 PROCEDURE — 80048 BASIC METABOLIC PNL TOTAL CA: CPT | Performed by: ORTHOPAEDIC SURGERY

## 2020-06-20 RX ORDER — ONDANSETRON 2 MG/ML
4 INJECTION INTRAMUSCULAR; INTRAVENOUS EVERY 6 HOURS PRN
Status: DISCONTINUED | OUTPATIENT
Start: 2020-06-20 | End: 2020-06-23

## 2020-06-20 RX ORDER — SODIUM CHLORIDE 9 MG/ML
75 INJECTION, SOLUTION INTRAVENOUS CONTINUOUS
Status: DISCONTINUED | OUTPATIENT
Start: 2020-06-20 | End: 2020-06-21

## 2020-06-20 RX ADMIN — FLUTICASONE PROPIONATE 1 SPRAY: 50 SPRAY, METERED NASAL at 20:36

## 2020-06-20 RX ADMIN — OXYCODONE HYDROCHLORIDE AND ACETAMINOPHEN 1 TABLET: 5; 325 TABLET ORAL at 14:20

## 2020-06-20 RX ADMIN — ONDANSETRON 4 MG: 2 INJECTION INTRAMUSCULAR; INTRAVENOUS at 16:24

## 2020-06-20 RX ADMIN — OXYCODONE HYDROCHLORIDE AND ACETAMINOPHEN 2 TABLET: 5; 325 TABLET ORAL at 19:26

## 2020-06-20 RX ADMIN — ONDANSETRON 4 MG: 2 INJECTION INTRAMUSCULAR; INTRAVENOUS at 10:07

## 2020-06-20 RX ADMIN — LATANOPROST 1 DROP: 50 SOLUTION OPHTHALMIC at 20:40

## 2020-06-20 RX ADMIN — CEFAZOLIN SODIUM 2 G: 2 INJECTION, SOLUTION INTRAVENOUS at 02:07

## 2020-06-20 RX ADMIN — DORZOLAMIDE HYDROCHLORIDE 1 DROP: 20 SOLUTION/ DROPS OPHTHALMIC at 08:40

## 2020-06-20 RX ADMIN — ENOXAPARIN SODIUM 40 MG: 40 INJECTION SUBCUTANEOUS at 08:40

## 2020-06-20 RX ADMIN — POLYETHYLENE GLYCOL 3350 17 G: 17 POWDER, FOR SOLUTION ORAL at 08:40

## 2020-06-20 RX ADMIN — SODIUM CHLORIDE 75 ML/HR: 9 INJECTION, SOLUTION INTRAVENOUS at 02:07

## 2020-06-20 RX ADMIN — FLUTICASONE PROPIONATE 1 SPRAY: 50 SPRAY, METERED NASAL at 08:40

## 2020-06-20 RX ADMIN — LEVOTHYROXINE SODIUM 75 MCG: 75 TABLET ORAL at 05:21

## 2020-06-20 RX ADMIN — OXYCODONE HYDROCHLORIDE AND ACETAMINOPHEN 1 TABLET: 5; 325 TABLET ORAL at 15:23

## 2020-06-20 RX ADMIN — FERROUS SULFATE TAB 325 MG (65 MG ELEMENTAL FE) 325 MG: 325 (65 FE) TAB at 08:40

## 2020-06-20 RX ADMIN — DORZOLAMIDE HYDROCHLORIDE 1 DROP: 20 SOLUTION/ DROPS OPHTHALMIC at 20:37

## 2020-06-20 RX ADMIN — PANTOPRAZOLE SODIUM 40 MG: 40 TABLET, DELAYED RELEASE ORAL at 05:21

## 2020-06-20 RX ADMIN — TIMOLOL MALEATE 1 DROP: 5 SOLUTION, GEL FORMING, EXTENDED RELEASE OPHTHALMIC at 08:40

## 2020-06-20 RX ADMIN — OXYCODONE HYDROCHLORIDE AND ACETAMINOPHEN 2 TABLET: 5; 325 TABLET ORAL at 00:36

## 2020-06-20 RX ADMIN — VITAMIN D, TAB 1000IU (100/BT) 1000 UNITS: 25 TAB at 10:07

## 2020-06-20 RX ADMIN — OXYCODONE HYDROCHLORIDE AND ACETAMINOPHEN 1 TABLET: 5; 325 TABLET ORAL at 10:11

## 2020-06-20 RX ADMIN — DIAZEPAM 5 MG: 5 TABLET ORAL at 22:31

## 2020-06-20 RX ADMIN — CEFAZOLIN SODIUM 2 G: 2 INJECTION, SOLUTION INTRAVENOUS at 10:07

## 2020-06-20 NOTE — ANESTHESIA POSTPROCEDURE EVALUATION
"Patient: Richelle Morelos    Procedure Summary     Date:  06/19/20 Room / Location:  [facility] OR  / [facility] MAIN OR    Anesthesia Start:  1803 Anesthesia Stop:  2045    Procedure:  OPEN REDUCTION INTERNAL FIXATION PROXIMAL FEMUR (Left Knee) Diagnosis:       Closed displaced subtrochanteric fracture of left femur, initial encounter (CMS/[facility])      (Closed displaced subtrochanteric fracture of left femur, initial encounter (CMS/[facility]) [S72.22XA])    Surgeon:  Oli Dao MD Provider:  Mikie Oneill MD    Anesthesia Type:  general ASA Status:  3 - Emergent          Anesthesia Type: general    Vitals  Vitals Value Taken Time   /72 6/19/2020  9:35 PM   Temp 36.8 °C (98.3 °F) 6/19/2020  9:30 PM   Pulse 103 6/19/2020  9:43 PM   Resp 16 6/19/2020  9:30 PM   SpO2 96 % 6/19/2020  9:43 PM   Vitals shown include unvalidated device data.        Post Anesthesia Care and Evaluation    Patient location during evaluation: bedside  Patient participation: complete - patient participated  Level of consciousness: awake  Pain score: 2  Pain management: adequate  Airway patency: patent  Anesthetic complications: No anesthetic complications  PONV Status: none  Cardiovascular status: acceptable  Respiratory status: acceptable  Hydration status: acceptable    Comments: /76 (BP Location: Right arm, Patient Position: Lying)   Pulse 104   Temp 36.8 °C (98.3 °F) (Oral)   Resp 16   Ht 157.5 cm (62\")   Wt 41.7 kg (92 lb)   SpO2 96%   BMI 16.83 kg/m²         "

## 2020-06-21 ENCOUNTER — APPOINTMENT (OUTPATIENT)
Dept: MRI IMAGING | Facility: HOSPITAL | Age: 65
End: 2020-06-21

## 2020-06-21 LAB
ANION GAP SERPL CALCULATED.3IONS-SCNC: 7.3 MMOL/L (ref 5–15)
ANTI-FYA: NORMAL
ANTI-LITTLE C: NORMAL
BASOPHILS # BLD AUTO: 0.03 10*3/MM3 (ref 0–0.2)
BASOPHILS NFR BLD AUTO: 0.4 % (ref 0–1.5)
BUN BLD-MCNC: 7 MG/DL (ref 8–23)
BUN/CREAT SERPL: 24.1 (ref 7–25)
CALCIUM SPEC-SCNC: 8.3 MG/DL (ref 8.6–10.5)
CHLORIDE SERPL-SCNC: 100 MMOL/L (ref 98–107)
CO2 SERPL-SCNC: 25.7 MMOL/L (ref 22–29)
CREAT BLD-MCNC: 0.29 MG/DL (ref 0.57–1)
DEPRECATED RDW RBC AUTO: 51.3 FL (ref 37–54)
EOSINOPHIL # BLD AUTO: 0.27 10*3/MM3 (ref 0–0.4)
EOSINOPHIL NFR BLD AUTO: 3.3 % (ref 0.3–6.2)
ERYTHROCYTE [DISTWIDTH] IN BLOOD BY AUTOMATED COUNT: 15.7 % (ref 12.3–15.4)
GFR SERPL CREATININE-BSD FRML MDRD: >150 ML/MIN/1.73
GLUCOSE BLD-MCNC: 103 MG/DL (ref 65–99)
HCT VFR BLD AUTO: 28.9 % (ref 34–46.6)
HGB BLD-MCNC: 9.5 G/DL (ref 12–15.9)
IMM GRANULOCYTES # BLD AUTO: 0.09 10*3/MM3 (ref 0–0.05)
IMM GRANULOCYTES NFR BLD AUTO: 1.1 % (ref 0–0.5)
LYMPHOCYTES # BLD AUTO: 0.92 10*3/MM3 (ref 0.7–3.1)
LYMPHOCYTES NFR BLD AUTO: 11.3 % (ref 19.6–45.3)
MCH RBC QN AUTO: 29.7 PG (ref 26.6–33)
MCHC RBC AUTO-ENTMCNC: 32.9 G/DL (ref 31.5–35.7)
MCV RBC AUTO: 90.3 FL (ref 79–97)
MONOCYTES # BLD AUTO: 1.08 10*3/MM3 (ref 0.1–0.9)
MONOCYTES NFR BLD AUTO: 13.3 % (ref 5–12)
NEUTROPHILS # BLD AUTO: 5.76 10*3/MM3 (ref 1.7–7)
NEUTROPHILS NFR BLD AUTO: 70.6 % (ref 42.7–76)
NRBC BLD AUTO-RTO: 0 /100 WBC (ref 0–0.2)
PANAGGLUTINATION: NORMAL
PLATELET # BLD AUTO: 385 10*3/MM3 (ref 140–450)
PMV BLD AUTO: 8.3 FL (ref 6–12)
POTASSIUM BLD-SCNC: 4.1 MMOL/L (ref 3.5–5.2)
RBC # BLD AUTO: 3.2 10*6/MM3 (ref 3.77–5.28)
SODIUM BLD-SCNC: 133 MMOL/L (ref 136–145)
WBC NRBC COR # BLD: 8.15 10*3/MM3 (ref 3.4–10.8)

## 2020-06-21 PROCEDURE — 72157 MRI CHEST SPINE W/O & W/DYE: CPT

## 2020-06-21 PROCEDURE — 72158 MRI LUMBAR SPINE W/O & W/DYE: CPT

## 2020-06-21 PROCEDURE — 85025 COMPLETE CBC W/AUTO DIFF WBC: CPT | Performed by: HOSPITALIST

## 2020-06-21 PROCEDURE — 25010000002 ENOXAPARIN PER 10 MG: Performed by: ORTHOPAEDIC SURGERY

## 2020-06-21 PROCEDURE — 97110 THERAPEUTIC EXERCISES: CPT

## 2020-06-21 PROCEDURE — 25010000002 LORAZEPAM PER 2 MG: Performed by: PSYCHIATRY & NEUROLOGY

## 2020-06-21 PROCEDURE — 72156 MRI NECK SPINE W/O & W/DYE: CPT

## 2020-06-21 PROCEDURE — A9577 INJ MULTIHANCE: HCPCS | Performed by: HOSPITALIST

## 2020-06-21 PROCEDURE — 0 GADOBENATE DIMEGLUMINE 529 MG/ML SOLUTION: Performed by: HOSPITALIST

## 2020-06-21 PROCEDURE — 80048 BASIC METABOLIC PNL TOTAL CA: CPT | Performed by: HOSPITALIST

## 2020-06-21 PROCEDURE — 99223 1ST HOSP IP/OBS HIGH 75: CPT | Performed by: PSYCHIATRY & NEUROLOGY

## 2020-06-21 RX ORDER — LORAZEPAM 2 MG/ML
2 INJECTION INTRAMUSCULAR ONCE
Status: COMPLETED | OUTPATIENT
Start: 2020-06-21 | End: 2020-06-21

## 2020-06-21 RX ADMIN — METOPROLOL TARTRATE 12.5 MG: 25 TABLET ORAL at 21:22

## 2020-06-21 RX ADMIN — ENOXAPARIN SODIUM 40 MG: 40 INJECTION SUBCUTANEOUS at 09:01

## 2020-06-21 RX ADMIN — GADOBENATE DIMEGLUMINE 8 ML: 529 INJECTION, SOLUTION INTRAVENOUS at 21:15

## 2020-06-21 RX ADMIN — OXYCODONE HYDROCHLORIDE AND ACETAMINOPHEN 2 TABLET: 5; 325 TABLET ORAL at 13:12

## 2020-06-21 RX ADMIN — DORZOLAMIDE HYDROCHLORIDE 1 DROP: 20 SOLUTION/ DROPS OPHTHALMIC at 09:01

## 2020-06-21 RX ADMIN — TIMOLOL MALEATE 1 DROP: 5 SOLUTION, GEL FORMING, EXTENDED RELEASE OPHTHALMIC at 09:01

## 2020-06-21 RX ADMIN — FERROUS SULFATE TAB 325 MG (65 MG ELEMENTAL FE) 325 MG: 325 (65 FE) TAB at 09:01

## 2020-06-21 RX ADMIN — DORZOLAMIDE HYDROCHLORIDE 1 DROP: 20 SOLUTION/ DROPS OPHTHALMIC at 21:20

## 2020-06-21 RX ADMIN — LORAZEPAM 2 MG: 2 INJECTION INTRAMUSCULAR; INTRAVENOUS at 18:18

## 2020-06-21 RX ADMIN — LEVOTHYROXINE SODIUM 75 MCG: 75 TABLET ORAL at 05:44

## 2020-06-21 RX ADMIN — VITAMIN D, TAB 1000IU (100/BT) 1000 UNITS: 25 TAB at 09:01

## 2020-06-21 RX ADMIN — OXYCODONE HYDROCHLORIDE AND ACETAMINOPHEN 2 TABLET: 5; 325 TABLET ORAL at 21:26

## 2020-06-21 RX ADMIN — POLYETHYLENE GLYCOL 3350 17 G: 17 POWDER, FOR SOLUTION ORAL at 09:01

## 2020-06-21 RX ADMIN — PANTOPRAZOLE SODIUM 40 MG: 40 TABLET, DELAYED RELEASE ORAL at 05:44

## 2020-06-21 RX ADMIN — FLUTICASONE PROPIONATE 1 SPRAY: 50 SPRAY, METERED NASAL at 09:01

## 2020-06-21 RX ADMIN — FLUTICASONE PROPIONATE 1 SPRAY: 50 SPRAY, METERED NASAL at 21:20

## 2020-06-21 RX ADMIN — OXYCODONE HYDROCHLORIDE AND ACETAMINOPHEN 2 TABLET: 5; 325 TABLET ORAL at 00:41

## 2020-06-21 RX ADMIN — LATANOPROST 1 DROP: 50 SOLUTION OPHTHALMIC at 21:14

## 2020-06-22 LAB
ANION GAP SERPL CALCULATED.3IONS-SCNC: 7.9 MMOL/L (ref 5–15)
BASOPHILS # BLD AUTO: 0.04 10*3/MM3 (ref 0–0.2)
BASOPHILS NFR BLD AUTO: 0.4 % (ref 0–1.5)
BH BB BLOOD EXPIRATION DATE: NORMAL
BH BB BLOOD TYPE BARCODE: 5100
BH BB DISPENSE STATUS: NORMAL
BH BB PRODUCT CODE: NORMAL
BH BB UNIT NUMBER: NORMAL
BUN BLD-MCNC: 6 MG/DL (ref 8–23)
BUN/CREAT SERPL: 25 (ref 7–25)
CALCIUM SPEC-SCNC: 8.3 MG/DL (ref 8.6–10.5)
CHLORIDE SERPL-SCNC: 96 MMOL/L (ref 98–107)
CO2 SERPL-SCNC: 28.1 MMOL/L (ref 22–29)
CREAT BLD-MCNC: 0.24 MG/DL (ref 0.57–1)
CROSSMATCH INTERPRETATION: NORMAL
DEPRECATED RDW RBC AUTO: 51.5 FL (ref 37–54)
EOSINOPHIL # BLD AUTO: 0.22 10*3/MM3 (ref 0–0.4)
EOSINOPHIL NFR BLD AUTO: 2.4 % (ref 0.3–6.2)
ERYTHROCYTE [DISTWIDTH] IN BLOOD BY AUTOMATED COUNT: 15.5 % (ref 12.3–15.4)
GFR SERPL CREATININE-BSD FRML MDRD: >150 ML/MIN/1.73
GLUCOSE BLD-MCNC: 95 MG/DL (ref 65–99)
HCT VFR BLD AUTO: 28.5 % (ref 34–46.6)
HGB BLD-MCNC: 9.6 G/DL (ref 12–15.9)
IMM GRANULOCYTES # BLD AUTO: 0.07 10*3/MM3 (ref 0–0.05)
IMM GRANULOCYTES NFR BLD AUTO: 0.8 % (ref 0–0.5)
LYMPHOCYTES # BLD AUTO: 1.02 10*3/MM3 (ref 0.7–3.1)
LYMPHOCYTES NFR BLD AUTO: 11.1 % (ref 19.6–45.3)
MCH RBC QN AUTO: 30.3 PG (ref 26.6–33)
MCHC RBC AUTO-ENTMCNC: 33.7 G/DL (ref 31.5–35.7)
MCV RBC AUTO: 89.9 FL (ref 79–97)
MONOCYTES # BLD AUTO: 1.16 10*3/MM3 (ref 0.1–0.9)
MONOCYTES NFR BLD AUTO: 12.6 % (ref 5–12)
NEUTROPHILS # BLD AUTO: 6.67 10*3/MM3 (ref 1.7–7)
NEUTROPHILS NFR BLD AUTO: 72.7 % (ref 42.7–76)
NRBC BLD AUTO-RTO: 0 /100 WBC (ref 0–0.2)
PLATELET # BLD AUTO: 418 10*3/MM3 (ref 140–450)
PMV BLD AUTO: 8.4 FL (ref 6–12)
POTASSIUM BLD-SCNC: 3.4 MMOL/L (ref 3.5–5.2)
RBC # BLD AUTO: 3.17 10*6/MM3 (ref 3.77–5.28)
SODIUM BLD-SCNC: 132 MMOL/L (ref 136–145)
UNIT  ABO: NORMAL
UNIT  RH: NORMAL
WBC NRBC COR # BLD: 9.18 10*3/MM3 (ref 3.4–10.8)

## 2020-06-22 PROCEDURE — 80048 BASIC METABOLIC PNL TOTAL CA: CPT | Performed by: HOSPITALIST

## 2020-06-22 PROCEDURE — 97166 OT EVAL MOD COMPLEX 45 MIN: CPT

## 2020-06-22 PROCEDURE — 97530 THERAPEUTIC ACTIVITIES: CPT

## 2020-06-22 PROCEDURE — 99232 SBSQ HOSP IP/OBS MODERATE 35: CPT | Performed by: NURSE PRACTITIONER

## 2020-06-22 PROCEDURE — 25010000002 ENOXAPARIN PER 10 MG: Performed by: ORTHOPAEDIC SURGERY

## 2020-06-22 PROCEDURE — 97535 SELF CARE MNGMENT TRAINING: CPT

## 2020-06-22 PROCEDURE — 85025 COMPLETE CBC W/AUTO DIFF WBC: CPT | Performed by: HOSPITALIST

## 2020-06-22 PROCEDURE — U0004 COV-19 TEST NON-CDC HGH THRU: HCPCS | Performed by: HOSPITALIST

## 2020-06-22 PROCEDURE — 97110 THERAPEUTIC EXERCISES: CPT

## 2020-06-22 PROCEDURE — 25010000002 ONDANSETRON PER 1 MG: Performed by: HOSPITALIST

## 2020-06-22 RX ADMIN — PANTOPRAZOLE SODIUM 40 MG: 40 TABLET, DELAYED RELEASE ORAL at 05:25

## 2020-06-22 RX ADMIN — ENOXAPARIN SODIUM 40 MG: 40 INJECTION SUBCUTANEOUS at 09:30

## 2020-06-22 RX ADMIN — OXYCODONE HYDROCHLORIDE AND ACETAMINOPHEN 2 TABLET: 5; 325 TABLET ORAL at 05:25

## 2020-06-22 RX ADMIN — FERROUS SULFATE TAB 325 MG (65 MG ELEMENTAL FE) 325 MG: 325 (65 FE) TAB at 09:30

## 2020-06-22 RX ADMIN — POLYETHYLENE GLYCOL 3350 17 G: 17 POWDER, FOR SOLUTION ORAL at 09:30

## 2020-06-22 RX ADMIN — FLUTICASONE PROPIONATE 1 SPRAY: 50 SPRAY, METERED NASAL at 20:48

## 2020-06-22 RX ADMIN — LEVOTHYROXINE SODIUM 75 MCG: 75 TABLET ORAL at 05:28

## 2020-06-22 RX ADMIN — ONDANSETRON 4 MG: 2 INJECTION INTRAMUSCULAR; INTRAVENOUS at 13:48

## 2020-06-22 RX ADMIN — DORZOLAMIDE HYDROCHLORIDE 1 DROP: 20 SOLUTION/ DROPS OPHTHALMIC at 20:48

## 2020-06-22 RX ADMIN — DOCUSATE SODIUM 100 MG: 100 CAPSULE, LIQUID FILLED ORAL at 19:53

## 2020-06-22 RX ADMIN — FLUTICASONE PROPIONATE 1 SPRAY: 50 SPRAY, METERED NASAL at 09:30

## 2020-06-22 RX ADMIN — VITAMIN D, TAB 1000IU (100/BT) 1000 UNITS: 25 TAB at 09:30

## 2020-06-22 RX ADMIN — METOPROLOL TARTRATE 12.5 MG: 25 TABLET ORAL at 20:48

## 2020-06-22 RX ADMIN — METOPROLOL TARTRATE 12.5 MG: 25 TABLET ORAL at 09:33

## 2020-06-22 RX ADMIN — DORZOLAMIDE HYDROCHLORIDE 1 DROP: 20 SOLUTION/ DROPS OPHTHALMIC at 09:30

## 2020-06-22 RX ADMIN — LATANOPROST 1 DROP: 50 SOLUTION OPHTHALMIC at 20:48

## 2020-06-22 RX ADMIN — TIMOLOL MALEATE 1 DROP: 5 SOLUTION, GEL FORMING, EXTENDED RELEASE OPHTHALMIC at 09:30

## 2020-06-22 RX ADMIN — OXYCODONE HYDROCHLORIDE AND ACETAMINOPHEN 1 TABLET: 5; 325 TABLET ORAL at 21:32

## 2020-06-23 DIAGNOSIS — M62.562 ATROPHY OF CALF MUSCLES OF BOTH LOWER EXTREMITIES: Primary | ICD-10-CM

## 2020-06-23 DIAGNOSIS — R25.3 FASCICULATIONS: ICD-10-CM

## 2020-06-23 DIAGNOSIS — M62.561 ATROPHY OF CALF MUSCLES OF BOTH LOWER EXTREMITIES: Primary | ICD-10-CM

## 2020-06-23 LAB
ANION GAP SERPL CALCULATED.3IONS-SCNC: 7.4 MMOL/L (ref 5–15)
BASOPHILS # BLD AUTO: 0.04 10*3/MM3 (ref 0–0.2)
BASOPHILS NFR BLD AUTO: 0.6 % (ref 0–1.5)
BUN BLD-MCNC: 7 MG/DL (ref 8–23)
BUN/CREAT SERPL: 28 (ref 7–25)
CALCIUM SPEC-SCNC: 8.3 MG/DL (ref 8.6–10.5)
CHLORIDE SERPL-SCNC: 98 MMOL/L (ref 98–107)
CO2 SERPL-SCNC: 27.6 MMOL/L (ref 22–29)
CREAT BLD-MCNC: 0.25 MG/DL (ref 0.57–1)
DEPRECATED RDW RBC AUTO: 54 FL (ref 37–54)
EOSINOPHIL # BLD AUTO: 0.24 10*3/MM3 (ref 0–0.4)
EOSINOPHIL NFR BLD AUTO: 3.3 % (ref 0.3–6.2)
ERYTHROCYTE [DISTWIDTH] IN BLOOD BY AUTOMATED COUNT: 15.9 % (ref 12.3–15.4)
GFR SERPL CREATININE-BSD FRML MDRD: >150 ML/MIN/1.73
GLUCOSE BLD-MCNC: 98 MG/DL (ref 65–99)
HCT VFR BLD AUTO: 27.7 % (ref 34–46.6)
HGB BLD-MCNC: 9 G/DL (ref 12–15.9)
IMM GRANULOCYTES # BLD AUTO: 0.05 10*3/MM3 (ref 0–0.05)
IMM GRANULOCYTES NFR BLD AUTO: 0.7 % (ref 0–0.5)
LYMPHOCYTES # BLD AUTO: 1.22 10*3/MM3 (ref 0.7–3.1)
LYMPHOCYTES NFR BLD AUTO: 16.9 % (ref 19.6–45.3)
MCH RBC QN AUTO: 29.8 PG (ref 26.6–33)
MCHC RBC AUTO-ENTMCNC: 32.5 G/DL (ref 31.5–35.7)
MCV RBC AUTO: 91.7 FL (ref 79–97)
MONOCYTES # BLD AUTO: 0.94 10*3/MM3 (ref 0.1–0.9)
MONOCYTES NFR BLD AUTO: 13 % (ref 5–12)
NEUTROPHILS # BLD AUTO: 4.75 10*3/MM3 (ref 1.7–7)
NEUTROPHILS NFR BLD AUTO: 65.5 % (ref 42.7–76)
NRBC BLD AUTO-RTO: 0 /100 WBC (ref 0–0.2)
PLATELET # BLD AUTO: 408 10*3/MM3 (ref 140–450)
PMV BLD AUTO: 8.5 FL (ref 6–12)
POTASSIUM BLD-SCNC: 3.6 MMOL/L (ref 3.5–5.2)
RBC # BLD AUTO: 3.02 10*6/MM3 (ref 3.77–5.28)
SODIUM BLD-SCNC: 133 MMOL/L (ref 136–145)
WBC NRBC COR # BLD: 7.24 10*3/MM3 (ref 3.4–10.8)

## 2020-06-23 PROCEDURE — 97110 THERAPEUTIC EXERCISES: CPT

## 2020-06-23 PROCEDURE — 25010000002 ENOXAPARIN PER 10 MG: Performed by: ORTHOPAEDIC SURGERY

## 2020-06-23 PROCEDURE — 85025 COMPLETE CBC W/AUTO DIFF WBC: CPT | Performed by: HOSPITALIST

## 2020-06-23 PROCEDURE — 80048 BASIC METABOLIC PNL TOTAL CA: CPT | Performed by: HOSPITALIST

## 2020-06-23 RX ORDER — DIAZEPAM 5 MG/1
5 TABLET ORAL NIGHTLY PRN
Qty: 10 TABLET | Refills: 0 | Status: SHIPPED | OUTPATIENT
Start: 2020-06-23 | End: 2020-06-26

## 2020-06-23 RX ORDER — OXYCODONE HYDROCHLORIDE AND ACETAMINOPHEN 5; 325 MG/1; MG/1
1 TABLET ORAL EVERY 4 HOURS PRN
Qty: 10 TABLET | Refills: 0 | Status: SHIPPED | OUTPATIENT
Start: 2020-06-23 | End: 2020-06-26

## 2020-06-23 RX ADMIN — Medication 5 MG: at 23:24

## 2020-06-23 RX ADMIN — FLUTICASONE PROPIONATE 1 SPRAY: 50 SPRAY, METERED NASAL at 20:01

## 2020-06-23 RX ADMIN — TIMOLOL MALEATE 1 DROP: 5 SOLUTION, GEL FORMING, EXTENDED RELEASE OPHTHALMIC at 07:47

## 2020-06-23 RX ADMIN — PANTOPRAZOLE SODIUM 40 MG: 40 TABLET, DELAYED RELEASE ORAL at 05:12

## 2020-06-23 RX ADMIN — OXYCODONE HYDROCHLORIDE AND ACETAMINOPHEN 2 TABLET: 5; 325 TABLET ORAL at 07:46

## 2020-06-23 RX ADMIN — VITAMIN D, TAB 1000IU (100/BT) 1000 UNITS: 25 TAB at 07:48

## 2020-06-23 RX ADMIN — OXYCODONE HYDROCHLORIDE AND ACETAMINOPHEN 1 TABLET: 5; 325 TABLET ORAL at 03:27

## 2020-06-23 RX ADMIN — DIAZEPAM 5 MG: 5 TABLET ORAL at 23:24

## 2020-06-23 RX ADMIN — FERROUS SULFATE TAB 325 MG (65 MG ELEMENTAL FE) 325 MG: 325 (65 FE) TAB at 07:46

## 2020-06-23 RX ADMIN — ENOXAPARIN SODIUM 40 MG: 40 INJECTION SUBCUTANEOUS at 07:46

## 2020-06-23 RX ADMIN — DORZOLAMIDE HYDROCHLORIDE 1 DROP: 20 SOLUTION/ DROPS OPHTHALMIC at 20:02

## 2020-06-23 RX ADMIN — OXYCODONE HYDROCHLORIDE AND ACETAMINOPHEN 1 TABLET: 5; 325 TABLET ORAL at 15:57

## 2020-06-23 RX ADMIN — LATANOPROST 1 DROP: 50 SOLUTION OPHTHALMIC at 20:01

## 2020-06-23 RX ADMIN — POLYETHYLENE GLYCOL 3350 17 G: 17 POWDER, FOR SOLUTION ORAL at 07:46

## 2020-06-23 RX ADMIN — DORZOLAMIDE HYDROCHLORIDE 1 DROP: 20 SOLUTION/ DROPS OPHTHALMIC at 07:47

## 2020-06-23 RX ADMIN — METOPROLOL TARTRATE 12.5 MG: 25 TABLET ORAL at 20:01

## 2020-06-23 RX ADMIN — LEVOTHYROXINE SODIUM 75 MCG: 75 TABLET ORAL at 05:11

## 2020-06-23 RX ADMIN — OXYCODONE HYDROCHLORIDE AND ACETAMINOPHEN 1 TABLET: 5; 325 TABLET ORAL at 20:01

## 2020-06-23 RX ADMIN — METOPROLOL TARTRATE 12.5 MG: 25 TABLET ORAL at 07:46

## 2020-06-24 VITALS
WEIGHT: 92 LBS | HEIGHT: 62 IN | SYSTOLIC BLOOD PRESSURE: 132 MMHG | TEMPERATURE: 97.8 F | DIASTOLIC BLOOD PRESSURE: 68 MMHG | OXYGEN SATURATION: 96 % | RESPIRATION RATE: 16 BRPM | BODY MASS INDEX: 16.93 KG/M2 | HEART RATE: 95 BPM

## 2020-06-24 LAB
REF LAB TEST METHOD: NORMAL
SARS-COV-2 RNA RESP QL NAA+PROBE: NOT DETECTED

## 2020-06-24 PROCEDURE — 25010000002 ENOXAPARIN PER 10 MG: Performed by: ORTHOPAEDIC SURGERY

## 2020-06-24 RX ADMIN — LEVOTHYROXINE SODIUM 75 MCG: 75 TABLET ORAL at 05:26

## 2020-06-24 RX ADMIN — POLYETHYLENE GLYCOL 3350 17 G: 17 POWDER, FOR SOLUTION ORAL at 08:17

## 2020-06-24 RX ADMIN — METOPROLOL TARTRATE 12.5 MG: 25 TABLET ORAL at 08:17

## 2020-06-24 RX ADMIN — FERROUS SULFATE TAB 325 MG (65 MG ELEMENTAL FE) 325 MG: 325 (65 FE) TAB at 08:16

## 2020-06-24 RX ADMIN — PANTOPRAZOLE SODIUM 40 MG: 40 TABLET, DELAYED RELEASE ORAL at 05:26

## 2020-06-24 RX ADMIN — OXYCODONE HYDROCHLORIDE AND ACETAMINOPHEN 1 TABLET: 5; 325 TABLET ORAL at 05:26

## 2020-06-24 RX ADMIN — ENOXAPARIN SODIUM 40 MG: 40 INJECTION SUBCUTANEOUS at 08:16

## 2020-06-24 RX ADMIN — TIMOLOL MALEATE 1 DROP: 5 SOLUTION, GEL FORMING, EXTENDED RELEASE OPHTHALMIC at 08:17

## 2020-06-24 RX ADMIN — VITAMIN D, TAB 1000IU (100/BT) 1000 UNITS: 25 TAB at 08:16

## 2020-06-24 RX ADMIN — OXYCODONE HYDROCHLORIDE AND ACETAMINOPHEN 1 TABLET: 5; 325 TABLET ORAL at 12:39

## 2020-06-24 RX ADMIN — FLUTICASONE PROPIONATE 1 SPRAY: 50 SPRAY, METERED NASAL at 08:18

## 2020-06-24 RX ADMIN — DORZOLAMIDE HYDROCHLORIDE 1 DROP: 20 SOLUTION/ DROPS OPHTHALMIC at 08:17

## 2020-07-16 NOTE — TELEPHONE ENCOUNTER
Please give the patient an order for physical therapy.  She is status post revision surgery for a femur neck fracture.  She will be in a toe-touch/partial weightbearing status on the operative extremity.  The physical therapy will focus on her upper extremities to regain strength and range of motion exercises of the lower extremity.  Her first follow-up visit will be with Sukhdeep because I am out of town that week and her subsequent visit will be with me in 4 weeks at the office at which point she will get x-rays.  Please communicate this to the patient thank you

## 2020-07-16 NOTE — TELEPHONE ENCOUNTER
I CALLED THE PATIENT TO SEE WHERE TO SEND THE PT ORDER AND SHE STATED THEY ARE COMING TO HER SISTERS HOUSE FOR THERAPY.

## 2020-07-16 NOTE — TELEPHONE ENCOUNTER
EVA FROM [facility] CALLED AND STATED THAT PATIENT WOULD LIKE AN ORDER FOR PHYSICAL THERAPY PLEASE

## 2020-09-17 NOTE — PROGRESS NOTES
FOLLOW UP VISIT    Patient: Richelle Morelos  ?  YOB: 1955    MRN: [id number]  ?  Chief Complaint   Patient presents with   • Right Wrist - Pain, Follow-up      ?  HPI:  F/U R WRIST ORIF 3/16/20  Richelle Juarez is doing well following an open reduction internal fixation of a right distal radius fracture performed by me in March.  She is now 6 months postop.  She is starting to resume activities of daily living and use this upper extremity for day-to-day function without any limitations.  She states that she does not have a clinical deformity.  Unfortunately in the meantime she fell and sustained a left proximal femur fracture.  This led to a revision surgery.  That fracture has been taken care off by  in [facility].  The patient states that she might want to follow-up with me because of logistics issues.  She finds it very difficult to travel to [place] in this day and age with limited family support.      Pain Location: RIGHT wrist  Radiation: none  Quality: dull  Intensity/Severity: mild   Duration: 6 months  Onset quality: sudden  Timing: intermittent  Aggravating Factors: repetitive motion  Alleviating Factors: NSAIDs  Previous Episodes: none mentioned  Associated Symptoms: swelling  ADLs Affected: grooming/hygiene/toileting/personal care  Previous Treatment: ORIF of her distal radius fracture.    This patient is an established patient.  This problem is not new to this examiner.      Allergies:   Allergies   Allergen Reactions   • Latex, Natural Rubber Rash   • Morphine And Related Nausea Only and Palpitations       Medications:   Home Medications:  Current Outpatient Medications on File Prior to Visit   Medication Sig   • albuterol (PROVENTIL) (2.5 MG/3ML) 0.083% nebulizer solution Take 2.5 mg by nebulization Every 6 (Six) Hours As Needed for Wheezing.   • D-3-5 125 MCG (5000 UT) capsule Take 10,000 Units by mouth Daily.   • docusate sodium (COLACE) 100 MG capsule Take 100  mg by mouth Daily As Needed for Constipation.   • dorzolamide-timolol (COSOPT) 22.3-6.8 MG/ML ophthalmic solution    • ferrous sulfate 324 (65 Fe) MG tablet delayed-release EC tablet Take 324 mg by mouth 2 (Two) Times a Day With Meals.   • fluticasone (FLONASE) 50 MCG/ACT nasal spray 1 spray by Each Nare route Daily As Needed.   • ipratropium (ATROVENT) 0.02 % nebulizer solution Take 500 mcg by nebulization 4 (Four) Times a Day.   • latanoprost (XALATAN) 0.005 % ophthalmic solution Administer 1 drop to both eyes Daily.   • levothyroxine (SYNTHROID, LEVOTHROID) 75 MCG tablet Take 75 mcg by mouth Daily.   • melatonin 5 MG tablet tablet Take 5 mg by mouth At Night As Needed.   • Misc. Devices (ROLLATOR ULTRA-LIGHT) misc Use rollator to assist with basic day to day ambulation around her home and out in public.   • Misc. Devices (ROLLATOR ULTRA-LIGHT) misc Use rollator to assist with basic day to day ambulation around her home and out in public. Used to help steady the balance of the patient.   • omeprazole (priLOSEC) 20 MG capsule    • omeprazole (priLOSEC) 40 MG capsule Take 40 mg by mouth Daily.   • polyethylene glycol (MIRALAX) packet Take 17 g by mouth Daily.     No current facility-administered medications on file prior to visit.      Current Medications:  Scheduled Meds:  PRN Meds:.    I have reviewed the patient's medical history in detail and updated the computerized patient record.  Review and summarization of old records include:    Past Medical History:   Diagnosis Date   • Anxiety    • Arthritis     OSTEO   • History of headache    • Hypothyroidism    • PONV (postoperative nausea and vomiting)    • Right shoulder pain    • Slow to wake up after anesthesia    • Vision loss     RIGHT EYE     Past Surgical History:   Procedure Laterality Date   • EYE SURGERY Right     SEVERAL TIME FOR HEMORRHAGE   • FEMUR OPEN REDUCTION INTERNAL FIXATION Left 6/19/2020    Procedure: OPEN REDUCTION INTERNAL FIXATION PROXIMAL  "FEMUR;  Surgeon: Oli Dao MD;  Location: [facility];  Service: Orthopedics;  Laterality: Left;   • HYSTERECTOMY      ABD WITH CAILIN S AND O   • KNEE ARTHROPLASTY Left    • LAPAROSCOPIC CHOLECYSTECTOMY     • REFRACTIVE SURGERY Bilateral    • TEETH EXTRACTION     • TOTAL SHOULDER ARTHROPLASTY W/ DISTAL CLAVICLE EXCISION Right 7/10/2018    Procedure: TOTAL SHOULDER REVERSE ARTHROPLASTY;  Surgeon: Naveen Michaud MD;  Location: [facility];  Service: Orthopedics     Social History     Occupational History   • Not on file   Tobacco Use   • Smoking status: Former Smoker     Packs/day: 0.50     Years: 0.50     Pack years: 0.25     Types: Cigarettes   • Smokeless tobacco: Never Used   • Tobacco comment: 1/2 PPD QUIT 47 YR AGO.    Substance and Sexual Activity   • Alcohol use: No   • Drug use: No   • Sexual activity: Defer      Family History   Problem Relation Age of Onset   • Malig Hyperthermia Neg Hx          Review of Systems   Constitutional: Negative.    Eyes: Negative.    Respiratory: Negative.    Cardiovascular: Negative.    Gastrointestinal: Negative.    Endocrine: Negative.    Musculoskeletal: Positive for arthralgias and joint swelling.   Skin: Negative.    Allergic/Immunologic: Negative.    Neurological: Negative.    Hematological: Negative.    Psychiatric/Behavioral: Negative.           Wt Readings from Last 3 Encounters:   09/17/20 43.1 kg (95 lb)   06/18/20 41.7 kg (92 lb)   06/21/20 41.7 kg (91 lb 14.9 oz)     Ht Readings from Last 3 Encounters:   09/17/20 157.5 cm (62\")   06/18/20 157.5 cm (62\")   06/21/20 157.5 cm (62.01\")     Body mass index is 17.38 kg/m².  Facility age limit for growth percentiles is 20 years.  Vitals:    09/17/20 1506   Temp: 97.9 °F (36.6 °C)         Physical Exam  Constitutional: Patient is oriented to person, place, and time. Appears well-developed and well-nourished.   HENT:   Head: Normocephalic and atraumatic.   Eyes: Conjunctivae and EOM are normal. Pupils are " equal, round, and reactive to light.   Cardiovascular: Normal rate, regular rhythm, normal heart sounds and intact distal pulses.   Pulmonary/Chest: Effort normal and breath sounds normal.   Musculoskeletal:   See detailed exam below   Neurological: Alert and oriented to person, place, and time. No sensory deficit. Coordination normal.   Skin: Skin is warm and dry. Capillary refill takes less than 2 seconds. No rash noted. No erythema.   Psychiatric: Patient has a normal mood and affect. Her behavior is normal. Judgment and thought content normal.   Nursing note and vitals reviewed.      Ortho Exam:     Right wrist-ORIF distal radius fracture. There is no clinical deformity at this point. The distal radius fracture appears to be appropriately stabilized. Neurovascular status is intact. Radial artery function is maintained. Capillary refill is 2 seconds with a brisk return. Median nerve function is well preserved. No hardware related problems are noted. Length of the radius is restored compared to the distal ulna. There is no instability of the DRUJ. Skin and soft tissues are swollen and bruised appropriate with both the fracture and the post-surgical intervention. The forearm muscles are soft and nontender. No evidence of RSD or any other untoward surgical complications are noted.  Dorsiflexion is 0 to 30 degrees and volar flexion is 0 to 40 degrees.  She is able to circumduct the wrist without any difficulties whatsoever.  Left hip-ORIF. The patient is postop status post orif of a hip fracture 3 month(s). Incisions are clean. Calf is soft and nontender. Edvin's sign is negative. Patient has been appropriately anticoagulated. There is no limb length discrepancy. No hardware related problems are noted. Greater trochanter is somewhat tender. IT band is painful and tender for the patient. Hip flexion is 0 to 90 degrees, hip abduction is 0 to 30 degrees. Dorsalis pedis and posterior tibial artery pulses are palpable.  Common peroneal nerve function is well preserved.       Diagnostics:  xrays to be obtained at next visit      Assessment:  Richelle was seen today for pain and follow-up.    Diagnoses and all orders for this visit:    S/P ORIF (open reduction internal fixation) fracture    Closed displaced subtrochanteric fracture of left femur with routine healing, subsequent encounter          Procedures  ?    Plan    · Compression/brace to the upper extremity to protect the fracture.  · Calcium and vitamin D for bone health.  · Patient will talk to her treating surgeon in [place] to see if she can follow-up with her hip fracture in my office at her next visit.  · She will need repeat x-rays of the hip and the radius fracture at follow-up.  · Rest, ice, compression, and elevation (RICE) therapy  · Stretching and strengthening exercises  · Alternate Ibuprofen and Tylenol as needed  · Follow up in 3 month(s)    Date of encounter: 09/17/2020   Naveen Michaud MD

## 2020-09-18 NOTE — TELEPHONE ENCOUNTER
PATIENT CALLED AND IS REQUESTING A PRESCRIPTION FOR MOBIC.  PATIENT IS S/P  RIGHT WRIST ORIF 3/16/20 AND LEFT HIP ORIF 6/19/20.  SHE USES [facility]

## 2021-01-01 ENCOUNTER — OFFICE VISIT CONVERTED (OUTPATIENT)
Dept: FAMILY MEDICINE CLINIC | Age: 66
End: 2021-01-01
Attending: FAMILY MEDICINE

## 2021-01-01 ENCOUNTER — HOSPITAL ENCOUNTER (OUTPATIENT)
Dept: OTHER | Facility: HOSPITAL | Age: 66
Discharge: HOME | End: 2021-02-03
Attending: FAMILY MEDICINE

## 2021-01-01 ENCOUNTER — READMISSION MANAGEMENT (OUTPATIENT)
Dept: CALL CENTER | Facility: HOSPITAL | Age: 66
End: 2021-01-01

## 2021-01-01 ENCOUNTER — TELEPHONE (OUTPATIENT)
Dept: FAMILY MEDICINE CLINIC | Age: 66
End: 2021-01-01

## 2021-01-01 VITALS
WEIGHT: 98.4 LBS | TEMPERATURE: 97.7 F | HEIGHT: 63 IN | DIASTOLIC BLOOD PRESSURE: 68 MMHG | BODY MASS INDEX: 17.43 KG/M2 | SYSTOLIC BLOOD PRESSURE: 90 MMHG | HEART RATE: 91 BPM

## 2021-01-01 VITALS
TEMPERATURE: 97.9 F | SYSTOLIC BLOOD PRESSURE: 125 MMHG | BODY MASS INDEX: 17.01 KG/M2 | HEIGHT: 63 IN | HEART RATE: 94 BPM | WEIGHT: 96 LBS | DIASTOLIC BLOOD PRESSURE: 64 MMHG

## 2021-01-01 VITALS
TEMPERATURE: 95 F | HEIGHT: 63 IN | BODY MASS INDEX: 16.96 KG/M2 | DIASTOLIC BLOOD PRESSURE: 69 MMHG | SYSTOLIC BLOOD PRESSURE: 125 MMHG | HEART RATE: 99 BPM

## 2021-01-01 VITALS
SYSTOLIC BLOOD PRESSURE: 133 MMHG | TEMPERATURE: 98.1 F | OXYGEN SATURATION: 97 % | DIASTOLIC BLOOD PRESSURE: 60 MMHG | WEIGHT: 95.8 LBS | HEART RATE: 83 BPM | HEIGHT: 63 IN | BODY MASS INDEX: 16.97 KG/M2

## 2021-01-01 VITALS
HEIGHT: 63 IN | OXYGEN SATURATION: 99 % | SYSTOLIC BLOOD PRESSURE: 138 MMHG | BODY MASS INDEX: 17.08 KG/M2 | TEMPERATURE: 97.3 F | WEIGHT: 96.4 LBS | HEART RATE: 87 BPM | DIASTOLIC BLOOD PRESSURE: 68 MMHG

## 2021-01-01 VITALS
HEART RATE: 103 BPM | TEMPERATURE: 97.6 F | DIASTOLIC BLOOD PRESSURE: 72 MMHG | HEIGHT: 63 IN | SYSTOLIC BLOOD PRESSURE: 124 MMHG | BODY MASS INDEX: 17.65 KG/M2 | OXYGEN SATURATION: 97 % | WEIGHT: 99.6 LBS

## 2021-01-01 VITALS
BODY MASS INDEX: 16.97 KG/M2 | SYSTOLIC BLOOD PRESSURE: 135 MMHG | WEIGHT: 95.8 LBS | HEART RATE: 83 BPM | DIASTOLIC BLOOD PRESSURE: 78 MMHG | HEIGHT: 63 IN | TEMPERATURE: 97.3 F

## 2021-01-01 VITALS
DIASTOLIC BLOOD PRESSURE: 76 MMHG | HEART RATE: 92 BPM | TEMPERATURE: 97.6 F | SYSTOLIC BLOOD PRESSURE: 147 MMHG | BODY MASS INDEX: 17.93 KG/M2 | HEIGHT: 63 IN | WEIGHT: 101.2 LBS

## 2021-01-01 VITALS
HEIGHT: 63 IN | TEMPERATURE: 97.6 F | SYSTOLIC BLOOD PRESSURE: 130 MMHG | DIASTOLIC BLOOD PRESSURE: 76 MMHG | HEART RATE: 84 BPM | BODY MASS INDEX: 17.14 KG/M2

## 2021-01-01 VITALS
WEIGHT: 97.8 LBS | TEMPERATURE: 98.1 F | SYSTOLIC BLOOD PRESSURE: 122 MMHG | BODY MASS INDEX: 17.33 KG/M2 | HEART RATE: 89 BPM | DIASTOLIC BLOOD PRESSURE: 59 MMHG | HEIGHT: 63 IN | OXYGEN SATURATION: 100 %

## 2021-01-01 VITALS
DIASTOLIC BLOOD PRESSURE: 66 MMHG | HEART RATE: 81 BPM | TEMPERATURE: 98.6 F | HEIGHT: 63 IN | SYSTOLIC BLOOD PRESSURE: 144 MMHG | BODY MASS INDEX: 17.5 KG/M2

## 2021-01-01 VITALS
WEIGHT: 100 LBS | SYSTOLIC BLOOD PRESSURE: 94 MMHG | HEIGHT: 63 IN | TEMPERATURE: 97 F | RESPIRATION RATE: 18 BRPM | DIASTOLIC BLOOD PRESSURE: 60 MMHG | OXYGEN SATURATION: 97 % | BODY MASS INDEX: 17.72 KG/M2 | HEART RATE: 75 BPM

## 2021-01-01 VITALS
DIASTOLIC BLOOD PRESSURE: 64 MMHG | HEART RATE: 84 BPM | SYSTOLIC BLOOD PRESSURE: 134 MMHG | WEIGHT: 98.3 LBS | BODY MASS INDEX: 17.42 KG/M2 | TEMPERATURE: 97.8 F | HEIGHT: 63 IN

## 2021-01-01 VITALS
DIASTOLIC BLOOD PRESSURE: 74 MMHG | SYSTOLIC BLOOD PRESSURE: 148 MMHG | TEMPERATURE: 98.2 F | BODY MASS INDEX: 16.76 KG/M2 | WEIGHT: 94.6 LBS | HEART RATE: 89 BPM | HEIGHT: 63 IN

## 2021-01-01 VITALS
DIASTOLIC BLOOD PRESSURE: 39 MMHG | WEIGHT: 100.4 LBS | TEMPERATURE: 97.4 F | BODY MASS INDEX: 17.79 KG/M2 | SYSTOLIC BLOOD PRESSURE: 126 MMHG | HEIGHT: 63 IN | HEART RATE: 90 BPM

## 2021-01-01 VITALS
OXYGEN SATURATION: 97 % | HEIGHT: 63 IN | HEART RATE: 91 BPM | BODY MASS INDEX: 16.42 KG/M2 | TEMPERATURE: 98.5 F | SYSTOLIC BLOOD PRESSURE: 141 MMHG | DIASTOLIC BLOOD PRESSURE: 60 MMHG

## 2021-01-01 VITALS
SYSTOLIC BLOOD PRESSURE: 139 MMHG | HEIGHT: 63 IN | HEART RATE: 83 BPM | BODY MASS INDEX: 17.14 KG/M2 | TEMPERATURE: 97.6 F | DIASTOLIC BLOOD PRESSURE: 73 MMHG

## 2021-01-01 VITALS
TEMPERATURE: 97.6 F | WEIGHT: 98 LBS | HEIGHT: 63 IN | BODY MASS INDEX: 17.36 KG/M2 | SYSTOLIC BLOOD PRESSURE: 137 MMHG | HEART RATE: 86 BPM | DIASTOLIC BLOOD PRESSURE: 67 MMHG

## 2021-01-01 VITALS
HEIGHT: 63 IN | HEART RATE: 85 BPM | DIASTOLIC BLOOD PRESSURE: 68 MMHG | SYSTOLIC BLOOD PRESSURE: 125 MMHG | TEMPERATURE: 97.6 F | WEIGHT: 92 LBS | BODY MASS INDEX: 16.3 KG/M2

## 2021-01-01 VITALS — BODY MASS INDEX: 17.72 KG/M2 | TEMPERATURE: 98 F | WEIGHT: 100 LBS | RESPIRATION RATE: 18 BRPM | HEIGHT: 63 IN

## 2021-01-01 VITALS
BODY MASS INDEX: 17.5 KG/M2 | HEART RATE: 118 BPM | DIASTOLIC BLOOD PRESSURE: 71 MMHG | WEIGHT: 98.8 LBS | TEMPERATURE: 97.2 F | SYSTOLIC BLOOD PRESSURE: 158 MMHG | HEIGHT: 63 IN

## 2021-01-01 VITALS
HEART RATE: 82 BPM | DIASTOLIC BLOOD PRESSURE: 48 MMHG | HEIGHT: 63 IN | TEMPERATURE: 97.4 F | WEIGHT: 96.6 LBS | SYSTOLIC BLOOD PRESSURE: 113 MMHG | BODY MASS INDEX: 17.12 KG/M2

## 2021-01-01 VITALS
TEMPERATURE: 97.5 F | HEART RATE: 100 BPM | DIASTOLIC BLOOD PRESSURE: 72 MMHG | BODY MASS INDEX: 17.36 KG/M2 | SYSTOLIC BLOOD PRESSURE: 131 MMHG | WEIGHT: 98 LBS | HEIGHT: 63 IN

## 2021-01-01 VITALS
SYSTOLIC BLOOD PRESSURE: 145 MMHG | TEMPERATURE: 97.4 F | DIASTOLIC BLOOD PRESSURE: 76 MMHG | WEIGHT: 102 LBS | HEART RATE: 106 BPM | HEIGHT: 63 IN | BODY MASS INDEX: 18.07 KG/M2

## 2021-01-01 VITALS
SYSTOLIC BLOOD PRESSURE: 113 MMHG | HEIGHT: 63 IN | BODY MASS INDEX: 17.75 KG/M2 | HEART RATE: 91 BPM | WEIGHT: 100.2 LBS | DIASTOLIC BLOOD PRESSURE: 84 MMHG | TEMPERATURE: 97.5 F | OXYGEN SATURATION: 98 %

## 2021-01-01 VITALS
TEMPERATURE: 98.9 F | HEIGHT: 63 IN | BODY MASS INDEX: 16.6 KG/M2 | SYSTOLIC BLOOD PRESSURE: 142 MMHG | WEIGHT: 93.7 LBS | DIASTOLIC BLOOD PRESSURE: 78 MMHG | HEART RATE: 102 BPM

## 2021-01-01 VITALS
HEART RATE: 95 BPM | DIASTOLIC BLOOD PRESSURE: 61 MMHG | HEIGHT: 63 IN | BODY MASS INDEX: 17.15 KG/M2 | WEIGHT: 96.8 LBS | TEMPERATURE: 98 F | SYSTOLIC BLOOD PRESSURE: 131 MMHG | OXYGEN SATURATION: 97 %

## 2021-01-01 DIAGNOSIS — G12.21 ALS (AMYOTROPHIC LATERAL SCLEROSIS) (HCC): ICD-10-CM

## 2021-01-01 DIAGNOSIS — G12.21 ALS (AMYOTROPHIC LATERAL SCLEROSIS) (HCC): Primary | ICD-10-CM

## 2021-01-01 RX ORDER — BUSPIRONE HYDROCHLORIDE 15 MG/1
15 TABLET ORAL 3 TIMES DAILY
Qty: 90 TABLET | Refills: 2 | Status: SHIPPED | OUTPATIENT
Start: 2021-01-01

## 2021-01-01 RX ORDER — HALOPERIDOL 2 MG/ML
1 SOLUTION ORAL EVERY 4 HOURS PRN
Qty: 30 ML | Refills: 0 | Status: SHIPPED | OUTPATIENT
Start: 2021-01-01

## 2021-01-01 RX ORDER — BUSPIRONE HYDROCHLORIDE 15 MG/1
15 TABLET ORAL 3 TIMES DAILY
COMMUNITY
End: 2021-01-01 | Stop reason: SDUPTHER

## 2021-01-01 RX ORDER — FENTANYL 25 UG/H
PATCH TRANSDERMAL
Qty: 5 PATCH | Refills: 0 | Status: SHIPPED | OUTPATIENT
Start: 2021-01-01

## 2021-01-01 RX ORDER — SILVER SULFADIAZINE 1 %
CREAM (GRAM) TOPICAL
Qty: 100 EACH | Refills: 0 | Status: SHIPPED | OUTPATIENT
Start: 2021-01-01

## 2021-01-01 RX ORDER — LORAZEPAM 2 MG/ML
1 CONCENTRATE ORAL EVERY 4 HOURS PRN
Qty: 30 ML | Refills: 0 | Status: SHIPPED | OUTPATIENT
Start: 2021-01-01

## 2021-01-01 RX ORDER — MORPHINE SULFATE 100 MG/5ML
SOLUTION ORAL
Qty: 30 ML | Refills: 0 | Status: SHIPPED | OUTPATIENT
Start: 2021-01-01

## 2021-01-01 RX ORDER — MORPHINE SULFATE 100 MG/5ML
SOLUTION ORAL
Qty: 30 ML | Refills: 0 | Status: SHIPPED | OUTPATIENT
Start: 2021-01-01 | End: 2021-01-01 | Stop reason: SDUPTHER

## 2021-04-28 NOTE — OUTREACH NOTE
Prep Survey      Responses   [facility] patient discharged from?  Non-BH   Is LACE score < 7 ?  Non-BH Discharge   Emergency Room discharge w/ pulse ox?  No   Eligibility  Not Eligible   [facility]   Date of Discharge  04/26/21   What are the reasons patient is not eligible?  Other [Non-MG provider]   Discharge diagnosis  unknown   Does the patient have one of the following disease processes/diagnoses(primary or secondary)?  Other   Prep survey completed?  Yes          Mikayla Casey RN

## 2021-05-18 NOTE — PROGRESS NOTES
Juanita Morelos PANCHOMaxine 1955     Office/Outpatient Visit    Visit Date: Tue, Mar 12, 2019 11:27 am    Provider: Comfort Petersen MD (Assistant: Dalia Art MA)    Location: [facility]        Electronically signed by Comfort Petersen MD on  2019 03:31:22 PM                             SUBJECTIVE:        CC:     Juanita Lee is a 63 year old White female.  presents today due to complaints of abdominal pressure, urinary frequency X 1 day         HPI:     Juanita Lee is here today with complaint of abdominal pain and urinary frequency for the past day.  She woke up yesterday with the pressure.    +Dysuria but no hematuria.  +Odorous urine.  +Frequency and urgency.  Small volume voids.  +Suprapubic abdominal pain, feels like pressure.  No back or flank pain.  No F/C.  No N/V.         She has been having R ear pain.  It has been itching and burning. She has been using some mineral oil on a Q-tip and that seems to help.   No drainage.  She also has a migraine today.     ROS:     CONSTITUTIONAL:  Negative for chills and fever.      E/N/T:  Positive for ear pain ( right ).      CARDIOVASCULAR:  Negative for chest pain.      RESPIRATORY:  Negative for dyspnea.      GASTROINTESTINAL:  Positive for abdominal pain ( suprapubic ).   Negative for nausea or vomiting.      GENITOURINARY:  Positive for dysuria, urgency and frequent urination.   Negative for hematuria or urinary incontinence.      NEUROLOGICAL:  Positive for headaches.          PMH/FMH/SH:     Last Reviewed on 3/12/2019 11:38 AM by Comfort Petersen    Past Medical History:                 PAST MEDICAL HISTORY         Gastroesophageal Reflux Disease     Osteoarthritis    Osteoporosis     hypoglycemia     vit D def         GYNECOLOGICAL HISTORY:             CURRENT MEDICAL PROVIDERS:    Ophthalmologist: ALVA/AAYUSH    Orthopedist: DONNA/NIVIA    VASCULAR-DR EUGENIA HANKINS         PREVENTIVE HEALTH MAINTENANCE             BONE DENSITY: was last done 3-23-16  with the following abnormality noted-- osteoporosis     COLORECTAL CANCER SCREENING: Up to date (colonoscopy q10y; sigmoidoscopy q5y; Cologuard q3y) was last done 9/3/2010, Results are in chart; colonoscopy with the following abnormalities noted-- diverticulosis     MAMMOGRAM: Done within last 2 years and results in are chart was last done 18 with normal results         PAST MEDICAL HISTORY                 ADVANCED DIRECTIVES: None         Surgical History:         Appendectomy: ;     Cholecystectomy: ;     Hysterectomy: ;     OOPHRECTOMY      Cataract Removal: details/ surgeriescomplications?;      section: X 2;     Joint Replacement: knee; ;         Family History:         Positive for Hyperlipidemia ( mother ) and Myocardial Infarction ( father ).      Positive for Breast Cancer ( mat. aunt ), Colon Cancer ( mat. uncle ) and Lung Cancer ( mat. uncle; mat. aunt ).      Positive for pulmonary fibrosis=- mat uncle.      Positive for Alzheimer's Disease ( mat. uncle ) and Cerebrovascular Accident ( mat. uncle ).          Social History:     Occupation:    Disabled     Marital Status:      Children: 3 children         Tobacco/Alcohol/Supplements:     Last Reviewed on 3/12/2019 11:38 AM by Comfort Petersen    Tobacco: She has a past history of cigarette smoking; quit date:  .  Non-drinker         Substance Abuse History:     Last Reviewed on 3/12/2019 11:38 AM by Comfort Petersen        Mental Health History:     Last Reviewed on 3/12/2019 11:38 AM by Comfort Petersen        Communicable Diseases (eg STDs):     Last Reviewed on 3/12/2019 11:38 AM by Comfort Petersen            Current Problems:     Last Reviewed on 2018 03:53 PM by Debbie Nichole    Vitamin D deficiency, unspecified     Use of high risk medications     Serous otitis media     Artificial joint replacement, Shoulder     Low back pain     Chronic insomnia     Acquired hypothyroidism, other specified cause      Osteoporosis, unspecified     Actinic keratosis     Varicose veins of lower extremities, with Pain     Tension Headache     Unexplained weight loss     Osteoporosis     Acquired hypothyroidism     Essential hypercholesterolemia     Chronic otitis media (OM) with effusion     Generalized anxiety disorder     Vitamin D deficiency     GERD     Herniated disc     Artificial joint replacement, Knee     Cataract     Post-menopausal HRT     Dry skin syndrome     Acute bronchitis     Screening for colorectal cancer     Headache         Immunizations:     None        Allergies:     Last Reviewed on 1/24/2019 03:30 PM by Dalia Art    Actonel: Myalgia (Adverse Reaction)    Morphine Sulfate: nausea, tachycardia        Current Medications:     Last Reviewed on 1/24/2019 03:30 PM by Dalia Art    Diazepam 5mg Tablet 1/2 to 1 PO BID PRN     Synthroid 0.075mg Tablet Take 1 tablet(s) by mouth daily     Fluticasone Propionate 50mcg/1actuation Nasal Spray 1 spray each nostil daily     Omeprazole 40mg Capsules, Extended Release 1 capsule daily     Sodium Chloride 0.65% Nasal Solution 1 squirt each nostril BID     Vitamin D3 5,000IU Capsules 2 tabs daily     Multivitamin/Mineral Supplement Tablet Take 1 tablet(s) by mouth daily     Mirtazapine 15mg Tablet 1 po at hs     Ibuprofen 200mg Tablet prn     PROBIOTIC QD         OBJECTIVE:        Vitals:         Current: 3/12/2019 11:33:01 AM    Ht:  5 ft, 2.75 in;  Wt: 98.8 lbs;  BMI: 17.6    T: 97.2 F (oral);  BP: 158/71 mm Hg (right arm, sitting);  P: 118 bpm (right arm (BP Cuff), sitting);  sCr: 0.59 mg/dL;  GFR: 80.13        Exams:     PHYSICAL EXAM:     GENERAL: vital signs recorded - well developed, well nourished;  well groomed;  no apparent distress;     EYES: extraocular movements intact; conjunctiva and cornea are normal; PERRLA;     RESPIRATORY: normal respiratory rate and pattern with no distress; normal breath sounds with no rales, rhonchi, wheezes or rubs;      CARDIOVASCULAR: normal rate; rhythm is regular;  no systolic murmur; no edema;     GASTROINTESTINAL: mild suprapubic tenderness;  no guarding;  no rebound tenderness;  normal bowel sounds;     MUSCULOSKELETAL: normal gait; normal overall tone No CVA tenderness         ASSESSMENT           599.0   N39.0  UTI              DDx:     788.41   R35.0  Urinary frequency              DDx:         ORDERS:         Lab Orders:       [id number]  [facility] - [facility] Urinalysis, automated, with micro  (Send-Out)                   PLAN:          UTI UA positive for large LE, moderate blood and positive nitrites.  Treating UTI with Macrobid 100 mg BID x 5 days, #10, 0 RF.  Rx sent in to [facility] per pt request.  Stay well hydrated.  RTC prn worsening or failure to improve of sx.          Urinary frequency           Orders:       [id number]  [facility] - [facility] Urinalysis, automated, with micro  (Send-Out)               CHARGE CAPTURE           **Please note: ICD descriptions below are intended for billing purposes only and may not represent clinical diagnoses**        Primary Diagnosis:         599.0 UTI            N39.0    Urinary tract infection, site not specified              Orders:          [id number]   Office/outpatient visit; established patient, level 3  (In-House)           788.41 Urinary frequency            R35.0    Frequency of micturition

## 2021-05-18 NOTE — PROGRESS NOTES
MorelosJuanitaMaxine 1955     Office/Outpatient Visit    Visit Date:  09:55 am    Provider: Kandy Bentley N.P. (Assistant: Francy Forrester)    Location: [facility]        Electronically signed by Kandy Bentley N.P. on  2019 10:24:05 AM                             SUBJECTIVE:        CC:     Juanita Lee is a 63 year old White female.  Bronchitis;         HPI:         Juanita Lee presents with upper respiratory illness.  These have been present for the past one week.  The symptoms include chest congestion, cough and ear pain.  She has not tried any medications for symptomatic relief.          Concerning abdominal pain, other specified site, Juanita Lee complains of abdominal pain that is diffuse in location.  It began 1 month ago.  She characterizes it as aching.  Associated symptoms include diarrhea.  Has been dealing with stress recently with mother. Was seen last month and had UTI that she took antibiotics for. Wanting to do H. pylori testing. She has had this in the past with similar symptoms.      ROS:     CONSTITUTIONAL:  Negative for chills and fever.      EYES:  Negative for eye drainage and eye pain.      E/N/T:  Positive for ear pain.   Negative for sore throat.      CARDIOVASCULAR:  Negative for chest pain and palpitations.      RESPIRATORY:  Positive for recent cough.   Negative for dyspnea or frequent wheezing.      GASTROINTESTINAL:  Positive for abdominal pain and diarrhea.   Negative for constipation, nausea or vomiting.      GENITOURINARY:  Negative for dysuria and frequent urination.          PMH/FMH/SH:     Last Reviewed on 3/12/2019 11:38 AM by Comfort Petersen    Past Medical History:                 PAST MEDICAL HISTORY         Gastroesophageal Reflux Disease     Osteoarthritis    Osteoporosis     hypoglycemia     vit D def         GYNECOLOGICAL HISTORY:             CURRENT MEDICAL PROVIDERS:    Ophthalmologist: ALVA/AAYUSH    Orthopedist: DONNA/NIVIA    [facility]ULAR-DR BELLO  [facility]         PREVENTIVE HEALTH MAINTENANCE             BONE DENSITY: was last done 3-23-16 with the following abnormality noted-- osteoporosis     COLORECTAL CANCER SCREENING: Up to date (colonoscopy q10y; sigmoidoscopy q5y; Cologuard q3y) was last done 9/3/2010, Results are in chart; colonoscopy with the following abnormalities noted-- diverticulosis     MAMMOGRAM: Done within last 2 years and results in are chart was last done 18 with normal results         PAST MEDICAL HISTORY                 ADVANCED DIRECTIVES: None         Surgical History:         Appendectomy: ;     Cholecystectomy: ;     Hysterectomy: ;     OOPHRECTOMY      Cataract Removal: / surgeriescomplications?;      section: X 2;     Joint Replacement: knee; ;         Family History:         Positive for Hyperlipidemia ( mother ) and Myocardial Infarction ( father ).      Positive for Breast Cancer ( mat. aunt ), Colon Cancer ( mat. uncle ) and Lung Cancer ( mat. uncle; mat. aunt ).      Positive for pulmonary fibrosis=- mat uncle.      Positive for Alzheimer's Disease ( mat. uncle ) and Cerebrovascular Accident ( mat. uncle ).          Social History:     Occupation:    Disabled     Marital Status:      Children: 3 children         Tobacco/Alcohol/Supplements:     Last Reviewed on 3/12/2019 11:38 AM by Comfort Petersen    Tobacco: She has a past history of cigarette smoking; quit date:  .  Non-drinker         Substance Abuse History:     Last Reviewed on 3/12/2019 11:38 AM by Comfort Petersen        Mental Health History:     Last Reviewed on 3/12/2019 11:38 AM by Comfort Petersen        Communicable Diseases (eg STDs):     Last Reviewed on 3/12/2019 11:38 AM by Comfort Petersen            Current Problems:     Last Reviewed on 3/12/2019 11:38 AM by Comfort Petersen    Urinary frequency     Vitamin D deficiency, unspecified     Use of high risk medications     Serous otitis media     Artificial joint  replacement, Shoulder     Low back pain     Chronic insomnia     Acquired hypothyroidism, other specified cause     Osteoporosis, unspecified     Actinic keratosis     Varicose veins of lower extremities, with Pain     Tension Headache     Unexplained weight loss     Osteoporosis     Acquired hypothyroidism     Essential hypercholesterolemia     Chronic otitis media (OM) with effusion     Generalized anxiety disorder     Vitamin D deficiency     GERD     Herniated disc     Artificial joint replacement, Knee     Cataract     Post-menopausal HRT     Dry skin syndrome     UTI     Screening for colorectal cancer     Headache         Immunizations:     None        Allergies:     Last Reviewed on 3/12/2019 11:38 AM by Comfort Petersen    Actonel: Myalgia (Adverse Reaction)    Morphine Sulfate: nausea, tachycardia        Current Medications:     Last Reviewed on 3/12/2019 11:38 AM by Comfort Petersen    Diazepam 5mg Tablet 1/2 to 1 PO BID PRN     Synthroid 0.075mg Tablet Take 1 tablet(s) by mouth daily     Fluticasone Propionate 50mcg/1actuation Nasal Spray 1 spray each nostil daily     Omeprazole 40mg Capsules, Extended Release 1 capsule daily     Sodium Chloride 0.65% Nasal Solution 1 squirt each nostril BID     Vitamin D3 5,000IU Capsules 2 tabs daily     Multivitamin/Mineral Supplement Tablet Take 1 tablet(s) by mouth daily     Bactrim DS 160mg/800mg Tablet 1 bid x 5 days     Ibuprofen 200mg Tablet prn     PROBIOTIC QD         OBJECTIVE:        Vitals:         Current: 4/11/2019 10:03:12 AM    Ht:  5 ft, 2.75 in;  Wt: 96.4 lbs;  BMI: 17.2    T: 97.3 F (oral);  BP: 138/68 mm Hg (right arm, sitting);  P: 87 bpm (right arm (BP Cuff), sitting);  sCr: 0.59 mg/dL;  GFR: 79.30    O2 Sat: 99 % (room air)        Exams:     PHYSICAL EXAM:     GENERAL: well developed, well nourished;  no apparent distress;     EYES: PERRL, EOMI     E/N/T: EARS: external auditory canal normal on the left;  bilateral TMs are normal;  small amount of  cerumen right EC; NOSE: normal turbinates; no sinus tenderness; OROPHARYNX: oral mucosa is normal; posterior pharynx shows no exudate;     NECK: range of motion is normal; trachea is midline;     RESPIRATORY: normal respiratory rate and pattern with no distress; normal breath sounds with no rales, rhonchi, wheezes or rubs;     CARDIOVASCULAR: normal rate; rhythm is regular;     GASTROINTESTINAL: nontender; normal bowel sounds; no organomegaly;     MUSCULOSKELETAL: normal gait;     NEUROLOGIC: mental status: alert and oriented x 3; GROSSLY INTACT     PSYCHIATRIC: appropriate affect and demeanor;         ASSESSMENT           466.0   J20.9  Acute bronchitis              DDx:     789.09   R10.817  Abdominal pain, other specified site              DDx:         ORDERS:         Meds Prescribed:       Zithromax (Azithromycin)  250mg Tablet 2 tablets on day 1, then 1 tablet on days 2-5.  #6 (Six) tablet(s) Refills: 0       Promethazine with Dextromethrophan (Promethazine with Dextromethrophan)  6.25mg/15mg per 5ml Syrup 1 tsp p.o. q 4-6 hrs. prn cough/nausea  #4 (Four) oz Refills: 0         Lab Orders:       [id number]  [facility] H.pylori Breath test  (Send-Out)                   PLAN:          Acute bronchitis Will treat with antibiotics based on reoccurence of symptoms. Consider chest xray if no improvement. May use Albuterol nebulizer that she has at home.         RECOMMENDATIONS given include: Push Fluids, Rest, Follow up if no improvement or worsening symptoms like high fevers, vomiting, weakness, or increasing shortness of air.    .      FOLLOW-UP: Schedule follow-up appointments on a p.r.n. basis. Chronic visit follow up           Prescriptions:       Zithromax (Azithromycin)  250mg Tablet 2 tablets on day 1, then 1 tablet on days 2-5.  #6 (Six) tablet(s) Refills: 0       Promethazine with Dextromethrophan (Promethazine with Dextromethrophan)  6.25mg/15mg per 5ml Syrup 1 tsp p.o. q 4-6 hrs. prn cough/nausea  #4 (Four) oz  Refills: 0          Abdominal pain, other specified site     LABORATORY:  Labs ordered to be performed today include H. pylori breath test.      RECOMMENDATIONS given include: Further recommendation to be given after test results are complete.      FOLLOW-UP: Schedule follow-up appointments on a p.r.n. basis. Chronic visit follow up           Orders:       [id number]  [facility] H.pylori Breath test  (Send-Out)               Patient Recommendations:        For  Acute bronchitis:     Schedule follow-up appointments as needed.          For  Abdominal pain, other specified site:     Schedule follow-up appointments as needed.              CHARGE CAPTURE           **Please note: ICD descriptions below are intended for billing purposes only and may not represent clinical diagnoses**        Primary Diagnosis:         466.0 Acute bronchitis            J20.9    Acute bronchitis, unspecified              Orders:          [id number]   Office/outpatient visit; established patient, level 4  (In-House)           789.09 Abdominal pain, other specified site            R10.817    Generalized abdominal tenderness

## 2021-05-18 NOTE — PROGRESS NOTES
Juanita Morelos 1955     Office/Outpatient Visit    Visit Date: Thu, Jun 27, 2019 01:07 pm    Provider: Juanita Eubanks N.P. (Assistant: Dalia Art MA)    Location: [facility]        Electronically signed by Juanita Eubanks N.P. on  06/28/2019 02:09:31 PM                             SUBJECTIVE:        CC:     Juanita Lee is a 64 year old White female.  presents today due to complaints of dysuria and abdominal pressure X 1 week, also has complaints of cough and congestion X 2 weeks         HPI:         Juanita Lee presents with dysuria.  Pt states dysuria x 1 week. States dark urine with an odor. States no meds taken. States abd pressure, denies N/V or fever.          Concerning cough, pt states productive green cough x 1 week. States taking mucinex with some relief. Denies fever, sore throat. States some sinus pressure and occassional headache.          Concerning ankle pain, other details: Pt states R ankle pain. Cannot recall any injury. States she has been walking funny on it and hurting.          PHQ-9 Depression Screening: Completed form scanned and in chart; Total Score 4 Alcohol Consumption Screening: Completed form scanned and in chart; Total Score 0     ROS:     CONSTITUTIONAL:  Negative for chills, fatigue, fever, and weight change.      EYES:  Negative for blurred vision.      CARDIOVASCULAR:  Negative for chest pain, orthopnea, paroxysmal nocturnal dyspnea and pedal edema.      RESPIRATORY:  Positive for recent cough.   Negative for dyspnea.      GASTROINTESTINAL:  Negative for abdominal pain, constipation, diarrhea, nausea and vomiting.      GENITOURINARY:  Positive for dysuria.      MUSCULOSKELETAL:  Positive for ankle pain.      NEUROLOGICAL:  Negative for dizziness, headaches, paresthesias, and weakness.      PSYCHIATRIC:  Negative for anxiety, depression, and sleep disturbances.          PMH/FMH/SH:     Last Reviewed on 6/27/2019 01:37 PM by Juanita Eubanks    [facility]  History:                 PAST MEDICAL HISTORY         Gastroesophageal Reflux Disease     Osteoarthritis    Osteoporosis     hypoglycemia     vit D def         GYNECOLOGICAL HISTORY:             CURRENT MEDICAL PROVIDERS:    Ophthalmologist: ALVA/AAYUSH    Orthopedist: DONNA/NVIIA    VASCULAR-DR EUGENIA HANKINS         PREVENTIVE HEALTH MAINTENANCE             BONE DENSITY: was last done 3-23-16 with the following abnormality noted-- osteoporosis     COLORECTAL CANCER SCREENING: Up to date (colonoscopy q10y; sigmoidoscopy q5y; Cologuard q3y) was last done 9/3/2010, Results are in chart; colonoscopy with the following abnormalities noted-- diverticulosis     MAMMOGRAM: Done within last 2 years and results in are chart was last done 18 with normal results         PAST MEDICAL HISTORY                 ADVANCED DIRECTIVES: None         Surgical History:         Appendectomy: ;     Cholecystectomy: ;     Hysterectomy: ;     OOPHRECTOMY      Cataract Removal: / surgeriescomplications?;      section: X 2;     Joint Replacement: knee; ;         Family History:         Positive for Hyperlipidemia ( mother ) and Myocardial Infarction ( father ).      Positive for Breast Cancer ( mat. aunt ), Colon Cancer ( mat. uncle ) and Lung Cancer ( mat. uncle; mat. aunt ).      Positive for pulmonary fibrosis=- mat uncle.      Positive for Alzheimer's Disease ( mat. uncle ) and Cerebrovascular Accident ( mat. uncle ).          Social History:     Occupation:    Disabled     Marital Status:      Children: 3 children         Tobacco/Alcohol/Supplements:     Last Reviewed on 2019 01:37 PM by Juanita Eubnaks    Tobacco: She has a past history of cigarette smoking; quit date:  .  Non-drinker         Substance Abuse History:     Last Reviewed on 2019 01:37 PM by Juanita Eubanks        Mental Health History:     Last Reviewed on 2019 01:37 PM by Juanita Eubanks         Communicable Diseases (eg STDs):     Last Reviewed on 6/27/2019 01:37 PM by Juanita Eubanks            Current Problems:     Last Reviewed on 6/27/2019 01:37 PM by Juanita Eubanks    Urinary frequency     Vitamin D deficiency, unspecified     Use of high risk medications     Serous otitis media     Artificial joint replacement, Shoulder     Low back pain     Chronic insomnia     Acquired hypothyroidism, other specified cause     Osteoporosis, unspecified     Actinic keratosis     Varicose veins of lower extremities, with Pain     Tension Headache     Unexplained weight loss     Osteoporosis     Acquired hypothyroidism     Essential hypercholesterolemia     Chronic otitis media (OM) with effusion     Generalized anxiety disorder     Vitamin D deficiency     GERD     Herniated disc     Artificial joint replacement, Knee     Cataract     Post-menopausal HRT     Dry skin syndrome     Cough     Screening for depression     Dysuria     Acute bronchitis     UTI     Screening for colorectal cancer     Headache         Immunizations:     None        Allergies:     Last Reviewed on 6/27/2019 01:37 PM by Juanita Eubanks    Actonel: Myalgia (Adverse Reaction)    Macrobid:    Morphine Sulfate: nausea, tachycardia        Current Medications:     Last Reviewed on 6/27/2019 01:37 PM by Juanita Eubanks    Diazepam 5mg Tablet 1/2 to 1 PO BID PRN     Synthroid 0.075mg Tablet Take 1 tablet(s) by mouth daily     Breo Ellipta 100mcg/25mcg Inhalation Powder Take 1 inhalation(s) by mouth daily     Fluticasone Propionate 50mcg/1actuation Nasal Spray 1 spray each nostil daily     Omeprazole 40mg Capsules, Extended Release 1 capsule daily     Sodium Chloride 0.65% Nasal Solution 1 squirt each nostril BID     Vitamin D3 5,000IU Capsules 2 tabs daily     Multivitamin/Mineral Supplement Tablet Take 1 tablet(s) by mouth daily     ProAir HFA 90mcg/1actuation Oral Inhaler     Timolol Maleate 0.5% Ophthalmic Solution 1 drop both eye daily      Ibuprofen 200mg Tablet prn     PROBIOTIC QD         OBJECTIVE:        Vitals:         Current: 6/27/2019 1:13:45 PM    Ht:  5 ft, 2.75 in;  Wt: 95.8 lbs;  BMI: 17.1    T: 97.3 F (oral);  BP: 135/78 mm Hg (left arm, sitting);  P: 83 bpm (left arm (BP Cuff), sitting);  sCr: 0.59 mg/dL;  GFR: 78.09        Exams:     PHYSICAL EXAM:     GENERAL: vital signs recorded - well developed, well nourished;  no apparent distress;     EYES: extraocular movements intact; conjunctiva and cornea are normal; PERRLA;     E/N/T: EARS: both TMs are bulging;  NOSE: nasal mucosa is erythematous;  bilateral maxillary sinus tenderness present; OROPHARYNX: oral mucosa reveals erythema;     NECK: range of motion is normal; thyroid is non-palpable;     RESPIRATORY: normal respiratory rate and pattern with no distress; normal breath sounds with no rales, rhonchi, wheezes or rubs;     CARDIOVASCULAR: normal rate; rhythm is regular;  no systolic murmur; no edema;     GASTROINTESTINAL: mild diffuse tenderness;  normal bowel sounds; no organomegaly;     MUSCULOSKELETAL: R ankle enderness, full ROM;     NEUROLOGICAL:  cranial nerves, motor and sensory function, reflexes, gait and coordination are all intact;     PSYCHIATRIC:  appropriate affect and demeanor; normal speech pattern; grossly normal memory;         ASSESSMENT:           788.1   R30.0  Dysuria              DDx:     461.8   J01.90  Acute sinusitis, other              DDx:     719.47   M25.571  Ankle pain              DDx:     V79.0   Z13.89  Screening for depression              DDx:         ORDERS:         Meds Prescribed:       Fluticasone Propionate 50mcg/1actuation Nasal Spray 1 spray each nostil daily  #1 (One) gm Refills: 2       Bactrim DS (Trimethoprim/Sulfamethoxazole ) Tablet Take 1 tablet(s) by mouth q12h for 10 days  #20 (Twenty) tablet(s) Refills: 0         Radiology/Test Orders:       [id number]  Radiologic examination, right ankle complete minimum 3 views  (Send-Out)            Lab Orders:       [id number]  [facility] Urinalysis, automated, with micro  (Send-Out)         [id number]  [facility] Urine Culture  (Send-Out)                   PLAN:          Dysuria     LABORATORY:  Labs ordered to be performed today include Urine culture.            Orders:       [id number]  [facility] Urinalysis, automated, with micro  (Send-Out)         [id number]  [facility] Urine Culture  (Send-Out)            Acute sinusitis, other           Prescriptions:       Fluticasone Propionate 50mcg/1actuation Nasal Spray 1 spray each nostil daily  #1 (One) gm Refills: 2       Bactrim DS (Trimethoprim/Sulfamethoxazole ) Tablet Take 1 tablet(s) by mouth q12h for 10 days  #20 (Twenty) tablet(s) Refills: 0          Ankle pain         RADIOLOGY:  I have ordered a right ankle xray to be done today.            Orders:       [id number]  Radiologic examination, right ankle complete minimum 3 views  (Send-Out)               Patient Recommendations:        For  Ankle pain:     right ankle x-ray             CHARGE CAPTURE:           Primary Diagnosis:     788.1 Dysuria            R30.0    Dysuria              Orders:          [id number]   Office/outpatient visit; established patient, level 4  (In-House)           461.8 Acute sinusitis, other            J01.90    Acute sinusitis, unspecified    719.47 Ankle pain            M25.571    Pain in right ankle and joints of right foot    V79.0 Screening for depression            Z13.89    Encounter for screening for other disorder

## 2021-05-18 NOTE — PROGRESS NOTES
Juanita Morelos  1955     Office/Outpatient Visit    Visit Date: Tue, Jan 28, 2020 02:33 pm    Provider: Wale Puente MD (Assistant: Razia Aguirre MA)    Location: [facility]        Electronically signed by Wale Puente MD on  01/29/2020 03:46:57 PM                             Subjective:        CC: Juanita Lee is a 64 year old White female.  She presents with congestion, cough and SOB.          HPI:       Pt has cough, congestion, and shortness of breath. Sx are worse at night. Sx started 3 weeks ago, initially she thought it was allergies. She is worried she has bronchitis. No fever, she does have fatigue. She is cold all the time. Her throat is scratchy. She has nasal, sinus, and chest congestion, cough is slightly produtive, cough worse at night. Cough worse when laying flat, +Orthopnea, She has swelling on right side, worse in foot. She is short of breath with walking with a short distance with a walker.       As above, her cough worse is when laying flat, she feels smothered to an extent when laying flat, and she has swelling of her right leg, worse in foot. She is short of breath with walking with a short distance with a walker and this is relatively new for her.     ROS:     CONSTITUTIONAL:  Positive for chills and fatigue.   Negative for fever.      EYES:  Negative for blurred vision.      E/N/T:  Negative for diminished hearing and nasal congestion.      CARDIOVASCULAR:  Negative for chest pain and palpitations.      RESPIRATORY:  Positive for recent cough ( with scant amounts of purulent sputum ) and dyspnea ( with mild exertion ).      GASTROINTESTINAL:  Negative for abdominal pain, constipation, diarrhea, nausea and vomiting.      MUSCULOSKELETAL:  Negative for arthralgias and myalgias.      NEUROLOGICAL:  Negative for paresthesias and weakness.      PSYCHIATRIC:  Negative for anxiety, depression and sleep disturbance.          Past Medical History / Family History / Social  History:         Last Reviewed on 2020 03:14 PM by Wale Puente    Past Medical History:                 PAST MEDICAL HISTORY         Gastroesophageal Reflux Disease     Osteoarthritis    Osteoporosis     hypoglycemia     vit D def         GYNECOLOGICAL HISTORY:             CURRENT MEDICAL PROVIDERS:    Ophthalmologist: ALVA/AAYUSH    Orthopedist: DONNA/NIVIA    VASCULAR-DR EUGENIA HANKINS         PREVENTIVE HEALTH MAINTENANCE             BONE DENSITY: was last done 3-23-16 with the following abnormality noted-- osteoporosis     COLORECTAL CANCER SCREENING: Up to date (colonoscopy q10y; sigmoidoscopy q5y; Cologuard q3y) was last done 9/3/2010, Results are in chart; colonoscopy with the following abnormalities noted-- diverticulosis     MAMMOGRAM: Done within last 2 years and results in are chart was last done 18 with normal results     Prolia Injection : 1st injection at [facility] 19         PAST MEDICAL HISTORY                 ADVANCED DIRECTIVES: None         Surgical History:         Appendectomy: ;     Cholecystectomy: ;     Hysterectomy: ;     OOPHRECTOMY     Cataract Removal: details/8 surgeriescomplications?;      section: X 2;     Joint Replacement: knee; ;         Family History:         Positive for Hyperlipidemia ( mother ) and Myocardial Infarction ( father ).      Positive for Breast Cancer ( mat. aunt ), Colon Cancer ( mat. uncle ) and Lung Cancer ( mat. uncle; mat. aunt ).      Positive for pulmonary fibrosis=- mat uncle.      Positive for Alzheimer's Disease ( mat. uncle ) and Cerebrovascular Accident ( mat. uncle ).          Social History:     Occupation:    Disabled     Marital Status:      Children: 3 children         Tobacco/Alcohol/Supplements:     Last Reviewed on 2020 03:14 PM by Wale Puente    Tobacco: She has a past history of cigarette smoking; quit date:  .  Non-drinker         Substance Abuse History:     Last Reviewed  on 1/28/2020 03:14 PM by Wale Puente        Mental Health History:     Last Reviewed on 1/28/2020 03:14 PM by Wale Puente        Communicable Diseases (eg STDs):     Last Reviewed on 1/28/2020 03:14 PM by Wale Puente        Current Problems:     Last Reviewed on 1/28/2020 03:14 PM by Wale Puente    Cataract    Artificial joint replacement, Knee    Post-menopausal HRT    Dry skin syndrome    Herniated disc    GERD    Vitamin D deficiency, unspecified    Vitamin D deficiency    Generalized anxiety disorder    Generalized anxiety disorder    Other long term (current) drug therapy    Chronic otitis media (OM) with effusion    Osteoporosis    Headache    Pure hypercholesterolemia, unspecified    Essential hypercholesterolemia    Other specified hypothyroidism    Acquired hypothyroidism    Chronic insomnia    Osteoporosis    Primary insomnia    Unexplained weight loss    Use of high risk medications    Tension Headache    Tension-type headache, unspecified, not intractable    Varicose veins of left lower extremities with pain    Actinic keratosis    Actinic keratosis    Varicose veins of lower extremities, with Pain    Screening for colorectal cancer    Encounter for screening for malignant neoplasm of colon    Osteoporosis, unspecified    Age-related osteoporosis without current pathological fracture    Unspecified nonsuppurative otitis media, bilateral    Acquired hypothyroidism, other specified cause    Serous otitis media    Low back pain    Low back pain    Artificial joint replacement, Shoulder    Presence of unspecified artificial shoulder joint    Vitamin D deficiency, unspecified    Urinary frequency    Urinary tract infection, site not specified    Frequency of micturition    UTI    Acute bronchitis    Acute bronchitis, unspecified    Screening for depression    Encounter for screening for other disorder    Adult failure to thrive    Diffuse arthralgia    Failure to gain weight    Thoracic  aortic aneurysm, without rupture    Gastro-esophageal reflux disease without esophagitis    Other osteoporosis without current pathological fracture    Polyosteoarthritis, unspecified    Thoracic aortic aneurysm, without rupture    Osteoporosis, other    Cough        Immunizations:     None        Allergies:     Last Reviewed on 1/28/2020 03:14 PM by Wale Puente    Actonel: Myalgia  (Adverse Reaction)    Macrobid:      Morphine Sulfate: Nausea,tachycardia         Current Medications:     Last Reviewed on 1/28/2020 03:14 PM by Wale Puente    Multivitamin/Mineral Supplement  Tablet [Take 1 tablet(s) by mouth daily]    cholecalciferol (vitamin D3) 5,000 unit oral capsule [2 tabs daily]    Omeprazole 40 mg oral capsule,delayed release (enteric coated) [1 capsule daily]    Synthroid 0.075mg Tablet [Take 1 tablet(s) by mouth daily]    Fluticasone Propionate 50 mcg/actuation Intranasal Spray, Suspension [1 spray each nostil daily]    Ibuprofen 200 mg oral tablet [prn]    PROBIOTIC QD     Diazepam 5 mg oral tablet [1/2 to 1 PO BID PRN]    ProAir HFA 90 mcg/actuation Inhalation HFA Aerosol Inhaler    Timolol Maleate 0.5 % ophthalmic (eye) Drops [1 drop both eye daily]    Breo Ellipta 100mcg/25mcg Inhalation Powder [Take 1 inhalation(s) by mouth daily]    Prolia 60 mg/mL subcutaneous Syringe [inject 1 milliliter (60 mg) by subcutaneous route every 6 months ]        Objective:        Vitals:         Current: 1/28/2020 2:48:06 PM    Ht:  5 ft, 2.75 in;  Wt: 97.8 lbs;  BMI: 17.5T: 98.1 F (oral);  BP: 122/59 mm Hg (right arm, sitting);  P: 89 bpm (right arm (BP Cuff), sitting);  sCr: 0.57 mg/dL;  GFR: 81.55O2 Sat: 100 % (room air)        Exams:     PHYSICAL EXAM:     GENERAL: vital signs recorded - well developed, well nourished, thin;  well groomed;  no apparent distress, appears minimally ill, tired-appearing;     EYES: extraocular movements intact; conjunctiva and cornea are normal; PERRLA;     E/N/T: OROPHARYNX:   "normal mucosa, dentition, gingiva, and posterior pharynx;     NECK: range of motion is normal; thyroid exam reveals not enlarged;     RESPIRATORY: normal respiratory rate and pattern with no distress; increased work of breathing with minimal exertion; rales (\"crackles\") present in the bases;     CARDIOVASCULAR: normal rate; rhythm is regular;  no systolic murmur; no edema;     GASTROINTESTINAL: nontender; normal bowel sounds;     MUSCULOSKELETAL: normal gait; normal overall tone     NEUROLOGIC: mental status: alert and oriented x 3;     PSYCHIATRIC:  appropriate affect and demeanor; normal speech pattern; grossly normal memory;         Assessment:         R05   Cough       R06.02   Shortness of breath           ORDERS:         Meds Prescribed:       [New Rx] azithromycin 250 mg oral tablet [take 2 tablets today, then 1 tablet (250 mg) by oral route once daily], #6 (six) tablets, Refills: 0 (zero)       [New Rx] furosemide 40 mg oral tablet [take 1 tablet (40 mg) by oral route once a day ], #10 (ten) tablets, Refills: 0 (zero)         Radiology/Test Orders:       [id number]  Radiologic exam chest 2 views  (Send-Out)              Lab Orders:       APPTO  Appointment need  (In-House)            [id number]  NTBNP - [facility] B-Type Natriurectic peptide  (Send-Out)            96438  BDCBC - [facility] CBC with 3 part diff  (Send-Out)            [id number]  COMP - [facility] Comp. Metabolic Panel  (Send-Out)              Other Orders:       [id number]  Noninvasive ear or pulse oximetry for oxygen saturation; single determination  (In-House)                      Plan:         CoughCough and shortness of breath could either be URI or possibly heart failure given worsening edema, orthopnea, and shortness of breath with minimal exertion. CXR is ordered to assess. We will hold off on ordering an ECHO for now. If CXR suggests heart failure or if Sx do not improve with azithromycin, then ECHO would be indicated. CBC, CMP, and BNP are ordered to assess. Pt will f/u " with PCP in 1 month.         RADIOLOGY:  I have ordered a chest x-ray (PA and lateral) to be done today.      FOLLOW-UP: Schedule a follow-up visit in 1 month.:.            Prescriptions:       [New Rx] azithromycin 250 mg oral tablet [take 2 tablets today, then 1 tablet (250 mg) by oral route once daily], #6 (six) tablets, Refills: 0 (zero)           Orders:       [id number]  Noninvasive ear or pulse oximetry for oxygen saturation; single determination  (In-House)            APPTO  Appointment need  (In-House)            86932  Radiologic exam chest 2 views  (Send-Out)              Shortness of breathw/u is suggestive of heart failure, with mild cardiomegaly and elevated BNP at 1,267. Lasix 40 mg qd is prescribed and pt will repeat CMP in 9 days to determine efficacy and to check Cr.     LABORATORY:  Labs ordered to be performed today include BNP, CBC, and Comprehensive metabolic panel.            Prescriptions:       [New Rx] furosemide 40 mg oral tablet [take 1 tablet (40 mg) by oral route once a day ], #10 (ten) tablets, Refills: 0 (zero)           Orders:       [id number]  NTBNP - [facility] B-Type Natriurectic peptide  (Send-Out)            66017  BDCBC - [facility] CBC with 3 part diff  (Send-Out)            42541  COMP - [facility] Comp. Metabolic Panel  (Send-Out)                  Patient Recommendations:        For  Cough:    Schedule a follow-up visit in 1 month.                APPOINTMENT INFORMATION:        Monday Tuesday Wednesday Thursday Friday Saturday Sunday            Time:___________________AM  PM   Date:_____________________             Charge Capture:         Primary Diagnosis:     R05  Cough           Orders:      [id number]  Office/outpatient visit; established patient, level 4  (In-House)            70434  Noninvasive ear or pulse oximetry for oxygen saturation; single determination  (In-House)            APPTO  Appointment need  (In-House)              R06.02  Shortness of breath

## 2021-05-18 NOTE — PROGRESS NOTES
Juanita Morelos 1955     Office/Outpatient Visit    Visit Date:  11:05 am    Provider: Kandy Bentley N.P. (Assistant: Coco Lentz MA)    Location: [facility]        Electronically signed by Kandy Bentley N.P. on  2018 12:42:31 PM                             SUBJECTIVE:        CC:     Juanita Lee is a 63 year old White female.  Patient is here for right shoulder pain.;         HPI: Juanita Lee pesents with c/o right shoulder pain. She first noticed this about one month ago when waking up one day. Notes that it was swollen. No known injury. She was seen in ER on 18. Xray normal. Was given Toradol injection that seemed helped minimally. She has been taking Ibuprofen and applying ice. Swelling improved but not resolved.     ROS:     CONSTITUTIONAL:  Negative for chills and fever.      CARDIOVASCULAR:  Negative for chest pain and palpitations.      RESPIRATORY:  Negative for dyspnea and frequent wheezing.      MUSCULOSKELETAL:  Positive for right shoulder pain.      INTEGUMENTARY/BREAST:  Negative for rash.      NEUROLOGICAL:  Negative for dizziness and headaches.          PMH/FMH/SH:     Last Reviewed on 2018 12:18 PM by Belkis Berkowitz    Past Medical History:                 PAST MEDICAL HISTORY         Gastroesophageal Reflux Disease     Osteoarthritis    Osteoporosis    chronic knee and back pain, acute shoulder pain     hypoglycemia     vit D def         GYNECOLOGICAL HISTORY:             CURRENT MEDICAL PROVIDERS:    Ophthalmologist: ALVA/BAR    Orthopedist: DONNA    VASCULAR-DR EUGENIA HANKINS         PREVENTIVE HEALTH MAINTENANCE         Patient confirms that she EGD 2010.      BONE DENSITY: was last done 3-23-16 with the following abnormality noted-- osteoporosis     COLONOSCOPY: Done within the last 10 years was last done 2010 with the following abnormalities noted-- diverticulosis     MAMMOGRAM: was last done 3-23-16 with normal results scheduled in  2017/dmt         Surgical History:         Appendectomy: ;     Cholecystectomy: ;     Hysterectomy: ;     OOPHRECTOMY      Cataract Removal: details/8 surgeriescomplications?;      section: X 2;     Joint Replacement: knee; ;         Family History:         Positive for Hyperlipidemia ( mother ) and Myocardial Infarction ( father ).      Positive for Breast Cancer ( mat. aunt ), Colon Cancer ( mat. uncle ) and Lung Cancer ( mat. uncle; mat. aunt ).      Positive for pulmonary fibrosis=- mat uncle.      Positive for Alzheimer's Disease ( mat. uncle ) and Cerebrovascular Accident ( mat. uncle ).          Social History:     Occupation:    Disabled     Marital Status:      Children: 3 children         Tobacco/Alcohol/Supplements:     Last Reviewed on 2018 11:07 AM by Coco Lentz    Tobacco: She has a past history of cigarette smoking; quit date:  .  Non-drinker         Substance Abuse History:     Last Reviewed on 2018 11:23 AM by Radames Purcell        Mental Health History:     Last Reviewed on 2018 11:23 AM by Radames Purcell        Communicable Diseases (eg STDs):     Last Reviewed on 2018 11:23 AM by Radames Purcell            Current Problems:     Last Reviewed on 2018 11:23 AM by Radames Purcell    Low back pain     Chronic insomnia     Use of high risk medications     Acquired hypothyroidism, other specified cause     Serous otitis media     Osteoporosis, unspecified     Actinic keratosis     Varicose veins of lower extremities, with Pain     Tension Headache     Unexplained weight loss     Osteoporosis     Acquired hypothyroidism     Essential hypercholesterolemia     Chronic otitis media (OM) with effusion     Generalized anxiety disorder     Vitamin D deficiency     GERD     Herniated disc     Artificial joint replacement, Knee     Cataract     Post-menopausal HRT     Dry skin syndrome     Screening test for viral disease -  unspecified     Screening for colorectal cancer     Headache         Immunizations:     None        Allergies:     Last Reviewed on 6/04/2018 11:05 AM by Coco Lentz    Actonel: Myalgia (Adverse Reaction)    Morphine Sulfate: nausea, tachycardia        Current Medications:     Last Reviewed on 6/04/2018 11:05 AM by Coco Lentz    Diazepam 5mg Tablet 1/2 to 1 PO BID PRN     Synthroid 0.075mg Tablet Take 1 tablet(s) by mouth daily     Sodium Chloride 0.65% Nasal Solution 1 squirt each nostril BID     Vitamin D3 5,000IU Capsules 2 tabs daily     Fluticasone Propionate 50mcg/1actuation Nasal Spray 1 spray each nostil daily     Naprosyn 500mg Tablet Take 1 tablet(s) by mouth bid  prn     Multivitamin/Mineral Supplement Tablet Take 1 tablet(s) by mouth daily     citracal + d     Ibuprofen 200mg Tablet prn     PROBIOTIC QD         OBJECTIVE:        Vitals:         Current: 6/4/2018 11:06:35 AM    Ht:  5 ft, 2.75 in;  Wt: 94.6 lbs;  BMI: 16.9    T: 98.2 F (oral);  BP: 149/79 mm Hg (left arm, sitting);  P: 89 bpm (left arm (BP Cuff), sitting);  sCr: 0.69 mg/dL;  GFR: 67.26        Repeat:     11:08:50 AM     BP:   148/74mm Hg (left arm, sitting)         Exams:     PHYSICAL EXAM:     GENERAL: well developed, well nourished;  no apparent distress;     RESPIRATORY: normal respiratory rate and pattern with no distress; normal breath sounds with no rales, rhonchi, wheezes or rubs;     CARDIOVASCULAR: normal rate; rhythm is regular;     BREAST/INTEGUMENT: SKIN: no significant rashes or lesions; no suspicious moles;     MUSCULOSKELETAL: normal gait; decreased range of motion noted in: right shoulder due to pain;  pain with range of motion in: right shoulder flexion, extension, adduction, abduction, internal rotation, and external rotation;  palpable nodule to right shoulder anterior AC joint;     NEUROLOGIC: mental status: alert and oriented x 3; GROSSLY INTACT     PSYCHIATRIC: appropriate affect and demeanor;          Procedures:     Shoulder pain     1. Kenalog 40 mg, 40mg/ml 1ml given mg given IM in the left hip; administered by ;  lot number [id number]; expires AUG2019             ASSESSMENT           719.41   M25.511  Shoulder pain              DDx:         ORDERS:         Procedures Ordered:       REFER  Referral to Specialist or Other Facility  (Send-Out)           Other Orders:       [id number]  Therapeutic injection  (In-House)           Kenalog, per 10 mg (x4)                 PLAN:          Shoulder pain Discussed with Cristian that I would recommend MRI for further evaluation of her shoulder. She declines at this time. Would rather see ortho Dr. Michaud for evaluation of her shoulder. Continue ice and Ibuprofen as needed.         REFERRALS:  Referral initiated to an orthopedist ( Dr. Naveen Michaud ).  Steroids Kenalog 40 mg 1 ml     FOLLOW-UP: Schedule follow-up appointments on a p.r.n. basis. Chronic visit follow up           Orders:       [id number]  Therapeutic injection  (In-House)                     Kenalog, per 10 mg (x4)       REFER  Referral to Specialist or Other Facility  (Send-Out)               Patient Recommendations:        For  Shoulder pain:     Schedule follow-up appointments as needed.              CHARGE CAPTURE           **Please note: ICD descriptions below are intended for billing purposes only and may not represent clinical diagnoses**        Primary Diagnosis:         719.41 Shoulder pain            M25.511    Pain in right shoulder              Orders:          [id number]   Office/outpatient visit; established patient, level 3  (In-House)             61758   Therapeutic injection  (In-House)                                           Kenalog, per 10 mg (x4)             ADDENDUMS:      ____________________________________    Date: 06/05/2018 04:03 PM    Author: Radha Mathias         Visit Note Faxed to:        Naveen Michaud  (Surgery, Orthopedic); Number [phone]

## 2021-05-18 NOTE — PROGRESS NOTES
Juanita Morelos  1955     Office/Outpatient Visit    Visit Date: Mon, May 4, 2020 01:54 pm    Provider: Belkis Berkowitz MD (Assistant: Nelly Kenyon RN)    Location: [facility]        Electronically signed by Belkis Berkowitz MD on  05/06/2020 10:17:46 AM                             Subjective:        CC: Juanita Lee is a 65 year old White female.  She is here today following a transition of care from an inpatient hospital: [facility] for COPD/URI. The patient was admitted on 4/24/20 and 4/25/20. Our office called the patient within 48 hours of discharge and scheduled the follow-up appointment.. During the patient's hospital stay the patient was treated by .  PT IS NOT TAKING TRAZODONE, SERTRALINE, POTASSIUM. [phone]--DOXIMITY AUDIO ONLY        HPI:       Juanita Lee presents with acute bronchitis.  It has been present for the past 4 weeks.  Respiratory symptoms include chest congestion, productive, green and intermittently clear cough and wheezing and last week she became very SOA with any exertion, so she was taken to the ER, and she was admitted with bronchitis with COPD acute on chronic exacerbation.  She was treated with steroids and IV antibiotics.  She was in the hospital for 2 days.  Her COVID test was normal.  Her other symptoms include headache, nasal discharge, sinus pain/pressure and nausea with abdominal cramps and diarrhea.  She denies body aches, edema, fever or nasal congestion.        ROS:     CONSTITUTIONAL:  Positive for fatigue.   Negative for chills or fever.      EYES:  Negative for blurred vision.      CARDIOVASCULAR:  Negative for chest pain, orthopnea, paroxysmal nocturnal dyspnea and pedal edema.      RESPIRATORY:  Positive for recent cough, dyspnea and frequent wheezing.      GASTROINTESTINAL:  Negative for abdominal pain, constipation, diarrhea, nausea and vomiting.      INTEGUMENTARY/BREAST:  Negative for rash.      NEUROLOGICAL:  Positive for dizziness  and weakness ( generalized ).   Negative for headaches.      PSYCHIATRIC:  Negative for anxiety, depression, and sleep disturbances.          Past Medical History / Family History / Social History:         Last Reviewed on 2020 02:33 PM by Belkis Berkowitz    Past Medical History:                 PAST MEDICAL HISTORY         Gastroesophageal Reflux Disease     Osteoarthritis    Osteoporosis     hypoglycemia     vit D def         GYNECOLOGICAL HISTORY:             CURRENT MEDICAL PROVIDERS:    Ophthalmologist: ALVA/AAYUSH    Orthopedist: DONNA/NIVIA    VASCULAR-DR EUGENIA HANKINS         PREVENTIVE HEALTH MAINTENANCE             BONE DENSITY: was last done 3-23-16 with the following abnormality noted-- osteoporosis     COLORECTAL CANCER SCREENING: Up to date (colonoscopy q10y; sigmoidoscopy q5y; Cologuard q3y) was last done 9/3/2010, Results are in chart; colonoscopy with the following abnormalities noted-- diverticulosis     MAMMOGRAM: Done within last 2 years and results in are chart was last done 18 with normal results     Prolia Injection : 1st injection at [facility] 19         PAST MEDICAL HISTORY                 ADVANCED DIRECTIVES: None         Surgical History:         Appendectomy: ;     Cholecystectomy: ;     Hysterectomy: ;     OOPHRECTOMY     Cataract Removal: details/8 surgeriescomplications?;      section: X 2;     Joint Replacement: knee; ;         Family History:         Positive for Hyperlipidemia ( mother ) and Myocardial Infarction ( father ).      Positive for Breast Cancer ( mat. aunt ), Colon Cancer ( mat. uncle ) and Lung Cancer ( mat. uncle; mat. aunt ).      Positive for pulmonary fibrosis=- mat uncle.      Positive for Alzheimer's Disease ( mat. uncle ) and Cerebrovascular Accident ( mat. uncle ).          Social History:     Occupation:    Disabled     Marital Status:      Children: 3 children         Tobacco/Alcohol/Supplements:     Last  Reviewed on 5/04/2020 01:54 PM by Nelly Kenyon    Tobacco: She has a past history of cigarette smoking; quit date:  1970's.  Non-drinker         Substance Abuse History:     Last Reviewed on 1/28/2020 03:14 PM by Wale Puente        Mental Health History:     Last Reviewed on 1/28/2020 03:14 PM by Wale Puente        Communicable Diseases (eg STDs):     Last Reviewed on 1/28/2020 03:14 PM by Wale Puente        Allergies:     Last Reviewed on 5/04/2020 01:54 PM by Nelly Kenyon    Actonel: Myalgia  (Adverse Reaction)    Macrobid:      Morphine Sulfate: Nausea,tachycardia         Current Medications:     Last Reviewed on 5/04/2020 01:57 PM by Nelly Kenyon    latanoprost 0.005 % ophthalmic (eye) Drops [instill 1 drop into affected eye(s) by ophthalmic route once daily inthe evening]    hormone essentials     menopause essentials     Fish Oil 360-1,200 mg oral capsule    melatonin 3 mg oral tablet    Alaway 0.025 % (0.035 %) ophthalmic (eye) Drops [instill 1 drop into affected eye(s) by ophthalmic route 2 times per day]    Multivitamin/Mineral Supplement  Tablet [Take 1 tablet(s) by mouth daily]    cholecalciferol (vitamin D3) 5,000 unit oral capsule [2 tabs daily]    Omeprazole 40 mg oral capsule,delayed release (enteric coated) [1 capsule daily]    Synthroid 0.075mg Tablet [Take 1 tablet(s) by mouth daily]    Ibuprofen 200 mg oral tablet [prn]    Diazepam 5 mg oral tablet [1/2 to 1 PO BID PRN]    ProAir HFA 90 mcg/actuation Inhalation HFA Aerosol Inhaler    Timolol Maleate 0.5 % ophthalmic (eye) Drops [1 drop both eye daily]    Prolia 60 mg/mL subcutaneous Syringe [inject 1 milliliter (60 mg) by subcutaneous route every 6 months ]    traZODone 50 mg oral tablet [take 1 tablet (50 mg) by oral route QHS]    sertraline 25 mg oral tablet [take 1 tablet (25 mg) by oral route once daily]    POT CHLORIDE CAP 10MEQ ER     MELOXICAM    TAB 7.5MG     DORZOL/TIMOL SOL 22.3-6.8     ONDANSETRON  TAB 8MG      brompheniramine-pseudoeph-DM 2-30-10 mg/5 mL oral Syrup [take 5-10 milliliters by oral route every 4-6 hours]        Objective:        Vitals:         Current: 5/4/2020 2:49:06 PM    Ht:  5 ft, 2.75 inT: 97.6 F (oral);  BP: 139/73 mm Hg (right arm, sitting);  P: 83 bpm (right arm (BP Cuff), sitting);  sCr: 0.64 mg/dL;  GFR: 87.19        Exams:     PHYSICAL EXAM:     GENERAL: vital signs recorded - no apparent distress;     RESPIRATORY: normal respiratory rate and pattern with no distress;     PSYCHIATRIC:  appropriate affect and demeanor; normal speech pattern; grossly normal memory;         Assessment:         J20.8   Acute bronchitis due to other specified organisms       J44.1   Chronic obstructive pulmonary disease with (acute) exacerbation       F51.01   Primary insomnia           Plan:         Acute bronchitis due to other specified organismsshe is improving, she was sent home breo and cough syrup and told to complete her abx and steroids given by the urgent care center and she has a pulmonology appt in [facility] this Friday with Dr Colvin.  She saw cardiology and was told she absolutely  did not have CHF.    Telehealth: Verbal consent obtained for visit to occur via phone call; Staff, other than provider, present during telephone visit include pablo amador; Total time spent was 12 minutes; [id number]--Khiwxwzmq E/M 11-20 minutes         Chronic obstructive pulmonary disease with (acute) exacerbationf/u Dr Colvin and continue her breo.continue her IS.         Primary insomniashe is still not sleeping well, even on the melatonin, she defers ambien            Charge Capture:         Primary Diagnosis:     J20.8  Acute bronchitis due to other specified organisms           Orders:      [id number]  Phys/QHP telephone evaluation 11-20 minutes  (In-House)              J44.1  Chronic obstructive pulmonary disease with (acute) exacerbation     F51.01  Primary insomnia

## 2021-05-18 NOTE — PROGRESS NOTES
Juanita Morelos  1955     Office/Outpatient Visit    Visit Date: Wed, May 13, 2020 11:16 am    Provider: Belkis Berkowitz MD (Assistant: Spurling, Sarah C, MA)    Location: [facility]        Electronically signed by Belkis Berkowitz MD on  05/13/2020 04:13:07 PM                             Subjective:        CC: Juanita Lee is a 65 year old White female.  Phone call [phone]; f/u bronchitis        HPI:       Juanita Lee presents with f/u for her recent hospitalization for her acute bronchitis.  It has been present for the past 5 weeks.  Respiratory symptoms include cough, she no longer has chest congestion or productive cough. Her weakness is improving, she has gained 2 #s and is on protein supplements.  She needs her vitamin D medication refilled.  She also has chronic anxiety and this is her F2F via telehealth, she is well controlled, she is on diazepam and needs her medication refilled, she tolerates med well, takes sparingly, she denies depression or sadness, she is coping well with covid, has no suicidal ideation and is sleeping better on the melatonin at night    ROS:     CONSTITUTIONAL:  Positive for fatigue.   Negative for chills or fever.      E/N/T:  Positive for nasal congestion ( clear drainage ).      CARDIOVASCULAR:  Negative for chest pain, orthopnea, paroxysmal nocturnal dyspnea and pedal edema.      RESPIRATORY:  Positive for recent cough.   Negative for dyspnea or frequent wheezing.      GASTROINTESTINAL:  Negative for abdominal pain, constipation, diarrhea, nausea and vomiting.      INTEGUMENTARY/BREAST:  Negative for rash.      NEUROLOGICAL:  Positive for weakness.   Negative for dizziness or headaches.      PSYCHIATRIC:  Negative for anxiety, depression, and sleep disturbances.          Past Medical History / Family History / Social History:         Last Reviewed on 5/13/2020 11:49 AM by Belkis Berkowitz    Past Medical History:                 PAST MEDICAL HISTORY          Gastroesophageal Reflux Disease     Osteoarthritis    Osteoporosis     hypoglycemia     vit D def         GYNECOLOGICAL HISTORY:             CURRENT MEDICAL PROVIDERS:    Ophthalmologist: ALVA/AAYUSH    Orthopedist: DONNA/NIVIA    VASCULAR-DR EUGENIA HANKINS         PREVENTIVE HEALTH MAINTENANCE             BONE DENSITY: was last done 3-23-16 with the following abnormality noted-- osteoporosis     COLORECTAL CANCER SCREENING: Up to date (colonoscopy q10y; sigmoidoscopy q5y; Cologuard q3y) was last done 9/3/2010, Results are in chart; colonoscopy with the following abnormalities noted-- diverticulosis     MAMMOGRAM: Done within last 2 years and results in are chart was last done 18 with normal results     Prolia Injection : 1st injection at [facility] 19         PAST MEDICAL HISTORY                 ADVANCED DIRECTIVES: None         Surgical History:         Appendectomy: ;     Cholecystectomy: ;     Hysterectomy: ;     OOPHRECTOMY     Cataract Removal: / surgeriescomplications?;      section: X 2;     Joint Replacement: knee; ;         Family History:         Positive for Hyperlipidemia ( mother ) and Myocardial Infarction ( father ).      Positive for Breast Cancer ( mat. aunt ), Colon Cancer ( mat. uncle ) and Lung Cancer ( mat. uncle; mat. aunt ).      Positive for pulmonary fibrosis=- mat uncle.      Positive for Alzheimer's Disease ( mat. uncle ) and Cerebrovascular Accident ( mat. uncle ).          Social History:     Occupation:    Disabled     Marital Status:      Children: 3 children         Tobacco/Alcohol/Supplements:     Last Reviewed on 2020 11:17 AM by Spurling, Sarah C    Tobacco: She has a past history of cigarette smoking; quit date:  .  Non-drinker         Substance Abuse History:     Last Reviewed on 2020 03:14 PM by Wale Puente        Mental Health History:     Last Reviewed on 2020 03:14 PM by Wale Puente         Communicable Diseases (eg STDs):     Last Reviewed on 1/28/2020 03:14 PM by Wale Puente        Allergies:     Last Reviewed on 5/13/2020 11:16 AM by Spurling, Sarah C    Actonel: Myalgia  (Adverse Reaction)    Macrobid:      Morphine Sulfate: Nausea,tachycardia         Current Medications:     Last Reviewed on 5/13/2020 11:17 AM by Spurling, Sarah C    latanoprost 0.005 % ophthalmic (eye) Drops [instill 1 drop into affected eye(s) by ophthalmic route once daily inthe evening]    hormone essentials     menopause essentials     Fish Oil 360-1,200 mg oral capsule    melatonin 3 mg oral tablet    Alaway 0.025 % (0.035 %) ophthalmic (eye) Drops [instill 1 drop into affected eye(s) by ophthalmic route 2 times per day]    Multivitamin/Mineral Supplement  Tablet [Take 1 tablet(s) by mouth daily]    cholecalciferol (vitamin D3) 5,000 unit oral capsule [2 tabs daily]    Omeprazole 40 mg oral capsule,delayed release (enteric coated) [1 capsule daily]    Synthroid 0.075mg Tablet [Take 1 tablet(s) by mouth daily]    Ibuprofen 200 mg oral tablet [prn]    Diazepam 5 mg oral tablet [1/2 to 1 PO BID PRN]    ProAir HFA 90 mcg/actuation Inhalation HFA Aerosol Inhaler    Timolol Maleate 0.5 % ophthalmic (eye) Drops [1 drop both eye daily]    Prolia 60 mg/mL subcutaneous Syringe [inject 1 milliliter (60 mg) by subcutaneous route every 6 months ]    DORZOL/TIMOL SOL 22.3-6.8     ONDANSETRON  TAB 8MG         Objective:        Vitals:         Current: 5/13/2020 11:45:48 AM    Ht:  5 ft, 2.75 in;  Wt: 92 lbs;  BMI: 16.4T: 97.6 F (oral);  BP: 125/68 mm Hg (right arm, sitting);  P: 85 bpm (right arm (BP Cuff), sitting);  sCr: 0.64 mg/dL;  GFR: 69.86        Assessment:         J20.9   Acute bronchitis, unspecified       E55.9   Vitamin D deficiency, unspecified       F41.1   Generalized anxiety disorder       Z79.899   Other long term (current) drug therapy           ORDERS:         Meds Prescribed:       [Refilled] cholecalciferol  (vitamin D3) 125 mcg (5,000 unit) oral capsule [1 tabs daily], #60 (sixty) capsules, Refills: 0 (zero)       [Refilled] Diazepam 5 mg oral tablet [1/2 to 1 PO BID PRN], #45 (forty five) tablets, Refills: 2 (two)         Lab Orders:       [id number]  Drug test prsmv read direct optical obs pr date  (Send-Out)                      Plan:         Acute bronchitis, unspecifiedher weight is up 2 pounds and she is on protein drinks, she lost a lot of wt in the hospital, her cough is minimal, she feels good, no fevers, she has completed her abx and steroids    Telehealth: Verbal consent obtained for visit to occur via phone call; Staff, other than provider, present during telephone visit include Sarah Spurling, MA; Total time spent was 11 minutes; [id number]--[id number] E/M 11-20 minutes         Vitamin D deficiency, unspecifiedher vitamin D was refilled          Prescriptions:       [Refilled] cholecalciferol (vitamin D3) 125 mcg (5,000 unit) oral capsule [1 tabs daily], #60 (sixty) capsules, Refills: 0 (zero)       [Refilled] Diazepam 5 mg oral tablet [1/2 to 1 PO BID PRN], #45 (forty five) tablets, Refills: 2 (two)         Generalized anxiety disorderstable on diazepam, med refilled, she takes sparingly        Other long term (current) drug therapy        RECOMMENDATIONS given include: amos reviewed, drug screen not performed due to COVID pandemic, consent is reviewed and signed and on the chart, she is aware of risk of addiction on this medication and understands that she will need to follow up for a review every 3 months and her medications will be adjusted or decreased as deemed appropriate at each visit.  No personal history of drug or alcohol abuse.  No concerns about diversion or abuse.  She denies side effects related to the medication.  She is  aware that she may be called in for pill counts..            Orders:       [id number]  Drug test prsmv read direct optical obs pr date  (Send-Out)                  Charge Capture:          Primary Diagnosis:     J20.9  Acute bronchitis, unspecified           Orders:      [id number]  Phys/QHP telephone evaluation 11-20 minutes  (In-House)              E55.9  Vitamin D deficiency, unspecified     F41.1  Generalized anxiety disorder     Z79.899  Other long term (current) drug therapy

## 2021-05-18 NOTE — PROGRESS NOTES
Juanita Morelos  1955     Office/Outpatient Visit    Visit Date: Wed, Mar 31, 2021 12:02 pm    Provider: Belkis Berkowitz MD (Assistant: Dalia Art MA)    Location: [facility]        Electronically signed by Belkis Berkowitz MD on  03/31/2021 02:57:18 PM                             Subjective:        CC: Juanita Lee is a 65 year old White female.  She is here today following a transition of care from an inpatient hospital: [place]. The patient was admitted on 03/20/2021. Our office called the patient within 48 hours of discharge and scheduled the follow-up appointment.. During the patient's hospital stay the patient was treated by Dr. Lui.  (VIDEO call );         HPI:       Cristian has newly diagnosed ALS and is seeing neurology (Dr Joey Scott)  and she went to [facility] ER with acute SOA and was found to be hypoxic and admitted for acute hypoxic respiratory failure with CAP and a LLL lung collapse.  CT shows no PE, collapse LLL, bronchial thickening.  She was treated with rocephin and zmax, her WBC was 11 on admit and improved to 9.8 but she overall failed to improve with her hypoxia so she was transferred to [facility] for further workup and treatment with a pulmonology specialist.  She was admitted on 3/20 and d/c home on 3/22.  She has underlying COPD and is on inhalers and has a trilogy vest to help her move mucus out of her lungs.  She also has dyphagia and LE weakness with her ALS.   Pulmonology was consulted and she was treated with formoterol, spiriva and percussion vest inpatient.  .  Speech evaluation was performed and a diet of nectar thick liquids and mechanical soft diet was recommended.  She improved on abx and passed a 6 minute walk test and was going to be d/c home on 3/22 but had a rapid response called due to altered mental status believed to be secondary to the valium and hypercapnia.  Discharge medications include cefdinir, sodium chloride, anoro ellipta.  She  c/o shoulder and knee pain due to laying in bed for 10 days.      [id number]    ROS:     CONSTITUTIONAL:  Positive for fatigue.   Negative for chills or fever.      E/N/T:  Positive for nasal congestion ( clear drainage ).      CARDIOVASCULAR:  Positive for pedal edema.   Negative for chest pain, orthopnea or paroxysmal nocturnal dyspnea.      RESPIRATORY:  Positive for recent cough ( mild ), dyspnea ( with mild exertion ) and frequent wheezing.      GASTROINTESTINAL:  Negative for abdominal pain, constipation, diarrhea, nausea and vomiting.      MUSCULOSKELETAL:  Positive for weakness in LE.      INTEGUMENTARY/BREAST:  Negative for rash.      NEUROLOGICAL:  Positive for weakness.   Negative for dizziness or headaches.      PSYCHIATRIC:  Negative for anxiety, depression, and sleep disturbances.          Past Medical History / Family History / Social History:         Last Reviewed on 3/31/2021 02:11 PM by Belkis Berkowitz    Past Medical History:                 PAST MEDICAL HISTORY         Pneumonia     Gastroesophageal Reflux Disease     Osteoarthritis    Osteoporosis    ALS     hypoglycemia     vit D def         GYNECOLOGICAL HISTORY:             CURRENT MEDICAL PROVIDERS:    Gastroenterologist: LIAN    Neurologist: RODOLFO    Ophthalmologist: AAYUSH    Orthopedist: Alicia/NIVIA         PREVENTIVE HEALTH MAINTENANCE             BONE DENSITY: was last done 2019 with the following abnormality noted-- osteoporosis     COLORECTAL CANCER SCREENING: Up to date (colonoscopy q10y; sigmoidoscopy q5y; Cologuard q3y) was last done 10/2019, Results are in chart; colonoscopy with the following abnormalities noted-- diverticulosis     EYE EXAM: was last done 2020 Dr Bartholomew     MAMMOGRAM: Done within last 2 years and results in are chart was last done 2019 with normal results     Prolia Injection : 1st injection at flaget 19         PAST MEDICAL HISTORY                 ADVANCED DIRECTIVES: None          Surgical History:         Appendectomy: ;     Cholecystectomy: ;     Hysterectomy: ;     OOPHRECTOMY     Cataract Removal: /8 surgeriescomplications?;      section: X 2;     Joint Replacement: knee; 1992;     Femur Fracture ORIF x 2;         Family History:         Positive for Hyperlipidemia ( mother ) and Myocardial Infarction ( father ).      Positive for Breast Cancer ( mat. aunt ), Colon Cancer ( mat. uncle ) and Lung Cancer ( mat. uncle; mat. aunt ).      Positive for pulmonary fibrosis=- mat uncle.      Positive for Alzheimer's Disease ( mat. uncle ) and Cerebrovascular Accident ( mat. uncle ).          Social History:     Occupation:    Disabled     Marital Status:      Children: 3 children         Tobacco/Alcohol/Supplements:     Last Reviewed on 3/31/2021 12:04 PM by Dalia Art    Tobacco: She has a past history of cigarette smoking; quit date:  .  Non-drinker         Substance Abuse History:     Last Reviewed on 2020 03:14 PM by Wale Puente        Mental Health History:     Last Reviewed on 2020 03:14 PM by Wale Puente        Communicable Diseases (eg STDs):     Last Reviewed on 2020 03:14 PM by Wale Puente        Allergies:     Last Reviewed on 2021 08:48 AM by Michelle Alexis    Actonel: Myalgia  (Adverse Reaction)    Macrobid:      Morphine Sulfate: Nausea,tachycardia         Current Medications:     Last Reviewed on 3/31/2021 12:04 PM by Dalia Art    latanoprost 0.005 % ophthalmic (eye) Drops [instill 1 drop into affected eye(s) by ophthalmic route once daily inthe evening]    cefdinir 300 mg oral capsule [take 1 capsule (300 mg) by oral route every 12 hours times 3 days]    pantoprazole 40 mg oral tablet, delayed release (enteric coated) [take 1 tablet (40 mg) by oral route 1 time per day]    Multivitamin/Mineral Supplement  Tablet [Take 1 tablet(s) by mouth daily]    cholecalciferol (vitamin D3) 125 mcg (5,000 unit)  oral capsule [1 tabs daily]    Synthroid 0.075mg Tablet [Take 1 tablet(s) by mouth daily]    diazePAM 5 mg oral tablet [TAKE 1/2  TABLET BY MOUTH TWO TIMES A DAY AS NEEDED]    ProAir HFA 90 mcg/actuation Inhalation HFA Aerosol Inhaler    Timolol Maleate 0.5 % ophthalmic (eye) Drops [1 drop both eye daily]    Prolia 60 mg/mL subcutaneous Syringe [inject 1 milliliter (60 mg) by subcutaneous route every 6 months ]    DORZOL/TIMOL SOL 22.3-6.8     ONDANSETRON  TAB 8MG     ANORO ELLIPT AER 62.5-25  [one inhalation daIly]    Mucinex DM  mg oral Tablet, Extended Release 12 hr [take 1  tablet by oral route every 12 hours ]    Singulair 10 mg oral tablet [take 1 tablet (10 mg) by oral route once daily in the evening]    nystatin 100,000 unit/mL oral Suspension [swish and swallow 5-10 ml  qid x 10 days]    DOXYCYCL HYC CAP 50MG         Objective:        Vitals:         Current: 3/31/2021 2:04:28 PM    Ht:  5 ft, 2.75 in;  Wt: 100 lbs;  BMI: 17.9T: 98 F (temporal);  R: 18 bpm;  sCr: 0.41 mg/dL;  GFR: 112.99        Exams:     PHYSICAL EXAM:     GENERAL:  well developed and nourished; appropriately groomed; in no apparent distress;     EYES: extraocular movements intact; conjunctiva and cornea are normal;     RESPIRATORY: normal respiratory rate and pattern with no distress;     MUSCULOSKELETAL: normal overall tone     NEUROLOGIC: mental status: alert and oriented x 3;     PSYCHIATRIC: appropriate affect and demeanor; normal psychomotor function; normal speech pattern;         Assessment:         J96.00   Acute respiratory failure, unspecified whether with hypoxia or hypercapnia       F41.1   Generalized anxiety disorder       R62.7   Adult failure to thrive       I50.20   Unspecified systolic (congestive) heart failure       J44.1   Chronic obstructive pulmonary disease with (acute) exacerbation       G12.21   Amyotrophic lateral sclerosis           ORDERS:         Meds Prescribed:       [Recorded] baclofen 20 mg oral  tablet [take 1 tablet (20 mg) by oral route 3 times per day prn muscle spasm]       [Recorded] albuterol sulfate 1.25 mg/3 mL Inhalation Solution for Nebulization [inhale 3 milliliters (1.25 mg) via nebulizer by inhalation route 4 times per day]       [Refilled] baclofen 20 mg oral tablet [take 1 tablet (20 mg) by oral route 3 times per day prn muscle spasm], #60 (sixty) tablets, Refills: 2 (two)       [Refilled] albuterol sulfate 1.25 mg/3 mL Inhalation Solution for Nebulization [inhale 3 milliliters (1.25 mg) via nebulizer by inhalation route 4 times per day], #120 (one hundred and twenty) milliliters, Refills: 5 (five)                 Plan:         Acute respiratory failure, unspecified whether with hypoxia or hypercapniashe is doing better, on O2 at night, breathing ok, cont antibiotic, will change the size of her suction tube to smaller, she is eating ok, add supplements with boost/ensure, BM are good, she has mild pain she needs home health with [facility], will send in her baclofen for muscle spasms          Prescriptions:       [Recorded] baclofen 20 mg oral tablet [take 1 tablet (20 mg) by oral route 3 times per day prn muscle spasm]       [Recorded] albuterol sulfate 1.25 mg/3 mL Inhalation Solution for Nebulization [inhale 3 milliliters (1.25 mg) via nebulizer by inhalation route 4 times per day]       [Refilled] baclofen 20 mg oral tablet [take 1 tablet (20 mg) by oral route 3 times per day prn muscle spasm], #60 (sixty) tablets, Refills: 2 (two)       [Refilled] albuterol sulfate 1.25 mg/3 mL Inhalation Solution for Nebulization [inhale 3 milliliters (1.25 mg) via nebulizer by inhalation route 4 times per day], #120 (one hundred and twenty) milliliters, Refills: 5 (five)         Generalized anxiety disorderstable    Telehealth: Verbal consent obtained for visit to occur via televideo conferencing; Staff, other than provider, present during telephone visit include only Dr Nelson was present  during the telehealth OV with patient         Adult failure to thriveadd supplements        Unspecified systolic (congestive) heart failurestable        Chronic obstructive pulmonary disease with (acute) exacerbationshe has swelling of feet and ankles-will order compression stockings        Amyotrophic lateral sclerosisshe needs home health with [facility]-ok to order her small suction tube and BP cuff for her.[facility]            Charge Capture:         Primary Diagnosis:     J96.00  Acute respiratory failure, unspecified whether with hypoxia or hypercapnia           Orders:      [id number]  Transitional care manage service 14 day discharge  (In-House)              F41.1  Generalized anxiety disorder     R62.7  Adult failure to thrive     I50.20  Unspecified systolic (congestive) heart failure     J44.1  Chronic obstructive pulmonary disease with (acute) exacerbation     G12.21  Amyotrophic lateral sclerosis         ADDENDUMS:      ____________________________________    Addendum: 04/13/2021 10:41 AM - One, Team         Visit Note Faxed to:        Elvia Quan [facility] ; Number [phone]            Addendum: 04/21/2021 02:56 PM - One, Team         Visit Note Faxed to:        User Entered Recipient; Number [phone]

## 2021-05-18 NOTE — PROGRESS NOTES
Juanita Morelos. 1955     Office/Outpatient Visit    Visit Date: Wed, Jan 9, 2019 04:02 pm    Provider: Belkis Berkowitz MD (Assistant: Sarah Spurling, MA)    Location: [facility]        Electronically signed by Belkis Berkowitz MD on  01/15/2019 10:39:43 AM                             SUBJECTIVE:        CC:     Juanita Lee is a 63 year old White female.  Patient is here for right shoulder pain.;         HPI:     chronic RAISA, she is on diazepam 1/2-1 5 mg daily, 30 pills lasts her a month, she suffers from chronic anxiety and feeling nervous all the time, the diazepam is working well for her and making her functional.  She tolerates med well and shows no si/sx of diversion.  she has a lot of stress in her life being the primary caregiver of her mom.  She has decrease her dose from a full pill bid to 1/2 pill bid.         Dx with acquired hypothyroidism; she is currently taking Levoxyl, 75 mcg daily.  TSH was last checked 5 months ago.  The result was reported as normal.  Currently, she is experiencing fatigue.  She reports no symptoms suggestive of adverse medication effect.      chronic  insomnia, she is not on any meds for this.  She is also concerned about her low weight         Dx with essential hypercholesterolemia; date of diagnosis Dec 2012.  Compliance with treatment has been good.  She denies experiencing any hypercholesterolemia related symptoms.  Most recent lab tests include Vitamin D, 25-Hydroxy:  48.1 (ng/mL) (08/02/2018), TSH:  1.280 (mIU/L) (08/02/2018), Creatinine, Serum:  0.60 (mg/dl) (08/02/2018), Glom Filt Rate, Est:  >60 (ml/min/1.73m2) (08/02/2018), Total Cholesterol:  187 (mg/dL) (08/02/2018), HDL:  80 (mg/dL) (08/02/2018), Triglycerides:  57 (mg/dL) (08/02/2018), LDL:  96 (mg/dL) (08/02/2018), Hemoglobin:  12.20 (gm/dl) (08/02/2018), Hematocrit:  35.8 (%) (08/02/2018).      ROS:     CONSTITUTIONAL:  Negative for chills and fever.      EYES:  Negative for blurred vision and eye pain.       E/N/T:  Negative for nasal congestion.      CARDIOVASCULAR:  Negative for chest pain and palpitations.      RESPIRATORY:  Negative for dyspnea and frequent wheezing.      GASTROINTESTINAL:  Negative for abdominal pain, constipation, diarrhea, hematochezia, melena, nausea and vomiting.      MUSCULOSKELETAL:  Positive for right shoulder pain.      INTEGUMENTARY/BREAST:  Negative for rash.      NEUROLOGICAL:  Negative for dizziness and headaches.          PMH/FMH/SH:     Last Reviewed on 2019 04:55 PM by Belkis Berkowitz    Past Medical History:                 PAST MEDICAL HISTORY         Gastroesophageal Reflux Disease     Osteoarthritis    Osteoporosis     hypoglycemia     vit D def         GYNECOLOGICAL HISTORY:             CURRENT MEDICAL PROVIDERS:    Ophthalmologist: ALVA/BAR    Orthopedist: DONNA/NIVIA    VASCULAR-DR EUGENIA HANKINS         PREVENTIVE HEALTH MAINTENANCE             BONE DENSITY: was last done 3-23-16 with the following abnormality noted-- osteoporosis     COLORECTAL CANCER SCREENING: colonoscopy with the following abnormalities noted-- diverticulosis     MAMMOGRAM: was last done 3-23-16 with normal results scheduled in 2017/dmt         PAST MEDICAL HISTORY                 ADVANCED DIRECTIVES: None         Surgical History:         Appendectomy: ;     Cholecystectomy: ;     Hysterectomy: ;     OOPHRECTOMY      Cataract Removal: details/8 surgeriescomplications?;      section: X 2;     Joint Replacement: knee; ;         Family History:         Positive for Hyperlipidemia ( mother ) and Myocardial Infarction ( father ).      Positive for Breast Cancer ( mat. aunt ), Colon Cancer ( mat. uncle ) and Lung Cancer ( mat. uncle; mat. aunt ).      Positive for pulmonary fibrosis=- mat uncle.      Positive for Alzheimer's Disease ( mat. uncle ) and Cerebrovascular Accident ( mat. uncle ).          Social History:     Occupation:    Disabled     Marital Status:       Children: 3 children         Tobacco/Alcohol/Supplements:     Last Reviewed on 1/09/2019 04:55 PM by Belkis Berkowitz    Tobacco: She has a past history of cigarette smoking; quit date:  1970's.  Non-drinker         Substance Abuse History:     Last Reviewed on 1/04/2018 11:23 AM by Radames Purcell        Mental Health History:     Last Reviewed on 1/04/2018 11:23 AM by Radames Purcell        Communicable Diseases (eg STDs):     Last Reviewed on 1/04/2018 11:23 AM by Radames Purcell            Allergies:     Last Reviewed on 9/24/2018 03:01 PM by Spurling, Sarah C    Actonel: Myalgia (Adverse Reaction)    Morphine Sulfate: nausea, tachycardia        Current Medications:     Last Reviewed on 9/24/2018 03:54 PM by Debbie Nichole    Diazepam 5mg Tablet 1/2 to 1 PO BID PRN     Synthroid 0.075mg Tablet Take 1 tablet(s) by mouth daily     Sodium Chloride 0.65% Nasal Solution 1 squirt each nostril BID     Vitamin D3 5,000IU Capsules 2 tabs daily     Fluticasone Propionate 50mcg/1actuation Nasal Spray 1 spray each nostil daily     Multivitamin/Mineral Supplement Tablet Take 1 tablet(s) by mouth daily     Ibuprofen 200mg Tablet prn     PROBIOTIC QD         OBJECTIVE:        Vitals:         Current: 1/9/2019 4:08:30 PM    Ht:  5 ft, 2.75 in;  Wt: 100.4 lbs;  BMI: 17.9    T: 97.4 F (oral);  BP: 126/39 mm Hg (right arm, sitting);  P: 90 bpm (right arm (BP Cuff), sitting);  sCr: 0.6 mg/dL;  GFR: 79.34        Exams:     PHYSICAL EXAM:     GENERAL: well developed, well nourished;  no apparent distress;     EYES: PERRL, EOMI     E/N/T: EARS: external auditory canal occluded by cerumen on the right;  left TM is normal;  OROPHARYNX:  normal mucosa, dentition, gingiva, and posterior pharynx;     NECK:  supple, full ROM; no thyromegaly; no carotid bruits;     RESPIRATORY: normal respiratory rate and pattern with no distress; normal breath sounds with no rales, rhonchi, wheezes or rubs;     CARDIOVASCULAR:  normal rate; rhythm is regular;     GASTROINTESTINAL: nontender, nondistended; no hepatosplenomegaly or masses; no bruits; R inguinal-soft, easily retractable hernia present;     NEUROLOGIC: mental status: alert and oriented x 3; GROSSLY INTACT     PSYCHIATRIC: appropriate affect and demeanor;         Lab/Test Results:             Amphetamines Screen, Urin:  Negative (01/09/2019),     BAR-Barbiturates Screen, Urin:  Negative (01/09/2019),     Buprenorphine:  Negative (01/09/2019),     BZO-Benzodiazepines Screen,Ur:  Positive (01/09/2019),     Cocaine(Metab.)Screen, Ur:  Negative (01/09/2019),     MDMA-Ecstasy:  Negative (01/09/2019),     Met-Methamphetamine:  Negative (01/09/2019),     MTD-Methadone Screen, Urine:  Negative (01/09/2019),     Opiate Screen, Urine:  Negative (01/09/2019),     OXY-Oxycodone:  Negative (01/09/2019),     PCP-Phencyclidine Screen, Uri:  Negative (01/09/2019),     TCA-Tricyclic Antidepressants:  Negative (01/09/2019),     THC Cannabinoids Screen, Urin:  Negative (01/09/2019),     Urine temperature:  confirmed (01/09/2019),     Date and time of last pill:  Valium @ 10pm on 1/8/19./[facility] (01/09/2019),     Performed by:  mario (01/09/2019),     Collection Time:  1657 (01/09/2019),             ASSESSMENT           300.02   F41.1  Generalized anxiety disorder              DDx:     381.3   Z79.899  Chronic otitis media (OM) with effusion              DDx:     244.8   E03.8  Acquired hypothyroidism              DDx:     307.42   F51.01  Chronic insomnia              DDx:     272.0   E78.00  Essential hypercholesterolemia              DDx:     V58.69   Z79.899  Use of high risk medications              DDx:     550.90   K40.90  Uncomplicated right inguinal hernia              DDx:     268.9   E55.9  Vitamin D deficiency, unspecified              DDx:         ORDERS:         Meds Prescribed:       Mirtazapine 15mg Tablet 1 po at hs  #30 (Thirty) tablet(s) Refills: 2         Lab Orders:       [id number]   Assay for Lamictal  (Send-Out)         36051  PHYS - [facility] PHYSICAL: CMP, CBC, TSH, LIPID: [id number], [id number], [id number], [id number]  (Send-Out)         87921  VITD - [facility] Vitamin D, 25 Hydroxy  (Send-Out)                   PLAN:          Generalized anxiety disorder stable on meds, add mirtazipine for sleep          Chronic otitis media (OM) with effusion unable to assess-start hydrogen peroxide daily for 2 weeks.          Acquired hypothyroidism due for labs in Feb, stable on meds          Chronic insomnia will try her on remeron           Prescriptions:       Mirtazapine 15mg Tablet 1 po at hs  #30 (Thirty) tablet(s) Refills: 2          Essential hypercholesterolemia     LABORATORY:  Labs ordered to be performed today include PHYSICAL PANEL; CMP, CBC, TSH, LIPID and Vitamin D.            Orders:       [id number]  PHYS - [facility] PHYSICAL: CMP, CBC, TSH, LIPID: [id number], [id number], [id number], [id number]  (Send-Out)         48395  VITD - [facility] Vitamin D, 25 Hydroxy  (Send-Out)            Use of high risk medications         RECOMMENDATIONS given include: amos reviewed, drug screen performed and appropriate, consent is reviewed and signed and on the chart, she is aware of risk of addiction on this medication and understands that she will need to follow up for a review every 3 months and her medications will be adjusted or decreased as deemed appropriate at each visit.  No personal history of drug or alcohol abuse.  No concerns about diversion or abuse.  She denies side effects related to the medication.  She is  aware that she may be called in for pill counts..            Orders:       [id number]  Assay for Lamictal  (Send-Out)            Uncomplicated right inguinal hernia stable, no pain, pt educated on dx and if she has pain she is to be seen asap          Vitamin D deficiency, [place] on 85693 units daily             CHARGE CAPTURE           **Please note: ICD descriptions below are intended for billing purposes only and may not represent clinical  diagnoses**        Primary Diagnosis:         300.02 Generalized anxiety disorder            F41.1    Generalized anxiety disorder              Orders:          [id number]   Office/outpatient visit; established patient, level 4  (In-House)           381.3 Chronic otitis media (OM) with effusion            Z79.899    Other long term (current) drug therapy    244.8 Acquired hypothyroidism            E03.8    Other specified hypothyroidism    307.42 Chronic insomnia            F51.01    Primary insomnia    272.0 Essential hypercholesterolemia            E78.00    Pure hypercholesterolemia, unspecified    V58.69 Use of high risk medications            Z79.899    Other long term (current) drug therapy    550.90 Uncomplicated right inguinal hernia            K40.90    Unilateral inguinal hernia, without obstruction or gangrene, not specified as recurrent    268.9 Vitamin D deficiency, unspecified            E55.9    Vitamin D deficiency, unspecified

## 2021-05-18 NOTE — PROGRESS NOTES
Juanita Morleos 1955     Office/Outpatient Visit    Visit Date: Mon, Sep 24, 2018 03:01 pm    Provider: Debbie Nichole N.P. (Assistant: Sarah Spurling, MA)    Location: [facility]        Electronically signed by Debbie Nichole N.P. on  2018 07:20:45 PM                             SUBJECTIVE:        CC:     Juanita Lee is a 63 year old White female.  presents today due to congestion and coughing. thinks she has bronchitis.          HPI:         Juanita Lee presents with cough.  It has been present for the past 3 weeks.  Respiratory symptoms include chest congestion, cough, chest tightness, shortness of breath and wheezing.   She denies sinus pressure.  Other symptoms include chest congestion, ear complaints and headache.  She denies fever or nasal congestion.  Sputum is described as moderate in volume and purulent.  She denies exposure to ill contacts.  She has already tried to relieve the symptoms with ibuprofen.  Pertinent medical history is unremarkable.      ROS:     CONSTITUTIONAL:  Negative for chills, fatigue and fever.      E/N/T:  Positive for ear pain ( bilateral ).      CARDIOVASCULAR:  Negative for chest pain and pedal edema.      RESPIRATORY:  Positive for recent cough, dyspnea and frequent wheezing.      ALLERGIC/IMMUNOLOGIC:  Negative for seasonal allergies.          PMH/FMH/SH:     Last Reviewed on 2018 01:50 PM by Belkis Berkowitz    Past Medical History:                 PAST MEDICAL HISTORY         Gastroesophageal Reflux Disease     Osteoarthritis    Osteoporosis     hypoglycemia     vit D def         GYNECOLOGICAL HISTORY:             CURRENT MEDICAL PROVIDERS:    Ophthalmologist: ALVA/AAYUSH    Orthopedist: DONNA/NIVIA    VASCULAR-DR EUGENIA HANKINS         PREVENTIVE HEALTH MAINTENANCE             BONE DENSITY: was last done 3-23-16 with the following abnormality noted-- osteoporosis     COLORECTAL CANCER SCREENING: colonoscopy with the following abnormalities  noted-- diverticulosis     MAMMOGRAM: was last done 3-23-16 with normal results scheduled in 2017/dmt         PAST MEDICAL HISTORY                 ADVANCED DIRECTIVES: None         Surgical History:         Appendectomy: ;     Cholecystectomy: ;     Hysterectomy: ;     OOPHRECTOMY      Cataract Removal: details/8 surgeriescomplications?;      section: X 2;     Joint Replacement: knee; ;         Family History:         Positive for Hyperlipidemia ( mother ) and Myocardial Infarction ( father ).      Positive for Breast Cancer ( mat. aunt ), Colon Cancer ( mat. uncle ) and Lung Cancer ( mat. uncle; mat. aunt ).      Positive for pulmonary fibrosis=- mat uncle.      Positive for Alzheimer's Disease ( mat. uncle ) and Cerebrovascular Accident ( mat. uncle ).          Social History:     Occupation:    Disabled     Marital Status:      Children: 3 children         Tobacco/Alcohol/Supplements:     Last Reviewed on 2018 03:04 PM by Spurling, Sarah C    Tobacco: She has a past history of cigarette smoking; quit date:  .  Non-drinker         Substance Abuse History:     Last Reviewed on 2018 11:23 AM by Radames Purcell        Mental Health History:     Last Reviewed on 2018 11:23 AM by Radames Purcell        Communicable Diseases (eg STDs):     Last Reviewed on 2018 11:23 AM by Radames Purcell            Current Problems:     Last Reviewed on 2018 03:53 PM by Debbie Nichole    Serous otitis media     Artificial joint replacement, Shoulder     Low back pain     Chronic insomnia     Use of high risk medications     Acquired hypothyroidism, other specified cause     Osteoporosis, unspecified     Actinic keratosis     Varicose veins of lower extremities, with Pain     Tension Headache     Unexplained weight loss     Osteoporosis     Acquired hypothyroidism     Essential hypercholesterolemia     Chronic otitis media (OM) with effusion     Generalized  anxiety disorder     Vitamin D deficiency     GERD     Herniated disc     Artificial joint replacement, Knee     Cataract     Post-menopausal HRT     Dry skin syndrome     Cough     Epigastric abdominal pain     Screening for colorectal cancer     Menopause     Screening breast exam - other     Screening for depression     Headache         Immunizations:     None        Allergies:     Last Reviewed on 9/24/2018 03:01 PM by Spurling, Sarah C    Actonel: Myalgia (Adverse Reaction)    Morphine Sulfate: nausea, tachycardia        Current Medications:     Last Reviewed on 9/24/2018 03:54 PM by Debbie Nichole    Diazepam 5mg Tablet 1/2 to 1 PO BID PRN     Synthroid 0.075mg Tablet Take 1 tablet(s) by mouth daily     Sodium Chloride 0.65% Nasal Solution 1 squirt each nostril BID     Vitamin D3 5,000IU Capsules 2 tabs daily     Fluticasone Propionate 50mcg/1actuation Nasal Spray 1 spray each nostil daily     Multivitamin/Mineral Supplement Tablet Take 1 tablet(s) by mouth daily     Ibuprofen 200mg Tablet prn     PROBIOTIC QD         OBJECTIVE:        Vitals:         Historical:     09/05/2018  BP:   131/61 mm Hg ( (left arm, , sitting, );)         Current: 9/24/2018 3:07:15 PM    Ht:  5 ft, 2.75 in;  Wt: 98.4 lbs;  BMI: 17.6    T: 97.7 F (oral);  BP: 90/68 mm Hg (right arm, sitting);  P: 91 bpm (right arm (BP Cuff), sitting);  sCr: 0.6 mg/dL;  GFR: 78.66        Exams:     PHYSICAL EXAM:     GENERAL: vital signs recorded - well developed, well nourished;  no apparent distress;     E/N/T: EARS: external auditory canal normal;  both TMs are dull and gray;  NOSE:  normal nasal mucosa, septum, turbinates, and sinuses; OROPHARYNX:  normal mucosa, dentition, gingiva, and posterior pharynx;     RESPIRATORY: normal appearance and symmetric expansion of chest wall; normal respiratory rate and pattern with no distress; expiratory wheezes in the MARSHALL and RUL;     CARDIOVASCULAR: normal rate; rhythm is regular;  no edema;      LYMPHATIC: no enlargement of cervical or facial nodes; no supraclavicular nodes;     MUSCULOSKELETAL: normal gait; normal range of motion of all major muscle groups; no limb or joint pain with range of motion;     NEUROLOGIC: mental status: alert and oriented x 3;     PSYCHIATRIC: appropriate affect and demeanor; normal speech pattern; normal thought and perception;         Procedures:     Cough     1. Kenalog 40 mg given IM in the left hip; administered by SCS;  lot number [id number]; expires 10-19             ASSESSMENT           786.2   R05   J20.8  Cough              DDx:         ORDERS:         Meds Prescribed:       Albuterol 90mcg/1actuation Oral Inhaler 1-2 puffs q 4-6 hrs PRN  #1 (One) inhaler refill Refills: 2         Other Orders:       [id number]  Therapeutic injection  (In-House)           Kenalog, per 10 mg (x4)                 PLAN:          Cough OTC robitussin DM - cough may persist for 4-6 weeks     Steroids Kenalog 40 mg 1 ml           Prescriptions:       Albuterol 90mcg/1actuation Oral Inhaler 1-2 puffs q 4-6 hrs PRN  #1 (One) inhaler refill Refills: 2           Orders:       [id number]  Therapeutic injection  (In-House)                     Kenalog, per 10 mg (x4)           Patient Education Handouts:       Acute Bronchitis              CHARGE CAPTURE           **Please note: ICD descriptions below are intended for billing purposes only and may not represent clinical diagnoses**        Primary Diagnosis:         786.2 Cough            R05    Cough           J20.8    Acute bronchitis due to other specified organisms              Orders:          [id number]   Office/outpatient visit; established patient, level 3  (In-House)             [id number]   Therapeutic injection  (In-House)                                           Kenalog, per 10 mg (x4)

## 2021-05-18 NOTE — PROGRESS NOTES
Juanita Morelos Ann  1955     Office/Outpatient Visit    Visit Date: Tue, Jan 26, 2021 08:42 am    Provider: Belkis Berkowitz MD (Assistant: Michelle Alexis MA)    Location: [facility]        Electronically signed by Belkis Berkowitz MD on  01/28/2021 12:19:17 PM                             Subjective:        CC: ALS new dx    HPI:       Richelle was newly dx with ALS by [facility] in Nov 2020.  She has been experiencing LE weakness which began summer 2019 and developed R foot drop which has caused her several falls, she now has to use a rollator and is in a R ankle splint.  EMG in 2019 showed 0/5 ADF and 4/5 ADF on R, she was referred to Iraj Gaona at  and he was suspicious of ALS dx at that time.  Since then her weakness and mobility have worsenes.  On fall caused a L femur fracture June 2020 requiring ORIF.  Another fall she fx her wrist.  She now also has difficulty with speecha and swallowing, she is unable to whistle, she has increased accumulation of saliva in her mouth but no drooling, occasional choking.  No difficulty using utensils or writing yet.  Standing and walking are limited to a few steps, unable to do stairs.  She has orthopnea for past 6 months.  She had a sleep study showing apnea so she is on CPAP at night.      She has had significant wt loss, but recently started a supplement in the ARIANNA protocol and has gained 5#'s.          She cant breath and she is having trouble breathing with worsening cough and feels like she has bronchitis,     She defers participating in any studies for ALS    She is not in any pain, but does have Headaches and occasional body aches (she is on baclofen and needs this refilled.        Neurology is recommending she get another sleep study to eval with capnography to help qualify her for NIV with this dx and get her on bipap or AVAP.  She also needs another FVC study to include MIP and or MEP.  Neurology said they are going to set her up for  overnight Cap/Ox study to expedite her getting the appropriate treatment.         Neurology has set her up for a power motility chair.     Neurology is scheduling her for a complete initial eval by the ALS clinic team and speech therapy to eval her need for MBSS, testing for SPEP +/- a 24'UPEP    ROS:     CONSTITUTIONAL:  Positive for fatigue.   Negative for chills or fever.      E/N/T:  Positive for nasal congestion ( clear drainage ).      CARDIOVASCULAR:  Negative for chest pain, orthopnea, paroxysmal nocturnal dyspnea and pedal edema.      RESPIRATORY:  Negative for dyspnea and frequent wheezing.      GASTROINTESTINAL:  Negative for abdominal pain, constipation, diarrhea, nausea and vomiting.      INTEGUMENTARY/BREAST:  Negative for rash.      NEUROLOGICAL:  Positive for weakness.   Negative for dizziness or headaches.      PSYCHIATRIC:  Negative for anxiety, depression, and sleep disturbances.          Past Medical History / Family History / Social History:         Last Reviewed on 2020 12:12 PM by Belkis Berkowitz    Past Medical History:                 PAST MEDICAL HISTORY         Gastroesophageal Reflux Disease     Osteoarthritis    Osteoporosis     hypoglycemia     vit D def         GYNECOLOGICAL HISTORY:             CURRENT MEDICAL PROVIDERS:    Gastroenterologist: LIAN    Neurologist: RODOLFO    Ophthalmologist: AAYUSH    Orthopedist: Alicia/NIVIA         PREVENTIVE HEALTH MAINTENANCE             BONE DENSITY: was last done 2019 with the following abnormality noted-- osteoporosis     COLORECTAL CANCER SCREENING: Up to date (colonoscopy q10y; sigmoidoscopy q5y; Cologuard q3y) was last done 10/2019, Results are in chart; colonoscopy with the following abnormalities noted-- diverticulosis     EYE EXAM: was last done 2020 Dr Bartholomew     MAMMOGRAM: Done within last 2 years and results in are chart was last done 2019 with normal results     Prolia Injection : 1st injection at [facility]  19         PAST MEDICAL HISTORY                 ADVANCED DIRECTIVES: None         Surgical History:         Appendectomy: ;     Cholecystectomy: ;     Hysterectomy: ;     OOPHRECTOMY     Cataract Removal: details/8 surgeriescomplications?;      section: X 2;     Joint Replacement: knee; 1992;     Femur Fracture ORIF x 2;         Family History:         Positive for Hyperlipidemia ( mother ) and Myocardial Infarction ( father ).      Positive for Breast Cancer ( mat. aunt ), Colon Cancer ( mat. uncle ) and Lung Cancer ( mat. uncle; mat. aunt ).      Positive for pulmonary fibrosis=- mat uncle.      Positive for Alzheimer's Disease ( mat. uncle ) and Cerebrovascular Accident ( mat. uncle ).          Social History:     Occupation:    Disabled     Marital Status:      Children: 3 children         Tobacco/Alcohol/Supplements:     Last Reviewed on 2020 11:29 AM by Juana Landers    Tobacco: She has a past history of cigarette smoking; quit date:  .  Non-drinker         Substance Abuse History:     Last Reviewed on 2020 03:14 PM by Wale Puente        Mental Health History:     Last Reviewed on 2020 03:14 PM by Wale Puente        Communicable Diseases (eg STDs):     Last Reviewed on 2020 03:14 PM by Wale Puente        Allergies:     Last Reviewed on 2020 11:29 AM by Juana Landers    Actonel: Myalgia  (Adverse Reaction)    Macrobid:      Morphine Sulfate: Nausea,tachycardia         Current Medications:     Last Reviewed on 2020 11:29 AM by Juana Landers    latanoprost 0.005 % ophthalmic (eye) Drops [instill 1 drop into affected eye(s) by ophthalmic route once daily inthe evening]    melatonin 3 mg oral tablet    Alaway 0.025 % (0.035 %) ophthalmic (eye) Drops [instill 1 drop into affected eye(s) by ophthalmic route 2 times per day]    ferrous sulfate 325 mg (65 mg iron) oral tablet [take 1 tablet (325 mg) by oral  route 1 time per day]    Multivitamin/Mineral Supplement  Tablet [Take 1 tablet(s) by mouth daily]    cholecalciferol (vitamin D3) 125 mcg (5,000 unit) oral capsule [1 tabs daily]    omeprazole 20 mg oral capsule,delayed release (enteric coated) [take 1 capsule (20 mg) by oral route 2 times per day before meals for10 days in combination with amoxicillin and clarithromycin]    Synthroid 0.075mg Tablet [Take 1 tablet(s) by mouth daily]    Ibuprofen 200 mg oral tablet [prn]    diazePAM 5 mg oral tablet [TAKE 1/2 TO 1 TABLET BY MOUTH TWO TIMES A DAY AS NEEDED]    ProAir HFA 90 mcg/actuation Inhalation HFA Aerosol Inhaler    Timolol Maleate 0.5 % ophthalmic (eye) Drops [1 drop both eye daily]    Prolia 60 mg/mL subcutaneous Syringe [inject 1 milliliter (60 mg) by subcutaneous route every 6 months ]    DORZOL/TIMOL SOL 22.3-6.8     ONDANSETRON  TAB 8MG     Bactrim -160 mg oral tablet [1 BID X 3 days]        Objective:        Vitals:         Current: 1/26/2021 8:49:46 AM    Ht:  5 ft, 2.75 inT: 95 F (temporal);  BP: 125/69 mm Hg (left arm, sitting);  P: 99 bpm (left arm (BP Cuff), sitting);  sCr: 0.41 mg/dL;  GFR: 136.10        Procedures:     Encounter for immunization    1. Patient experienced no reaction.              Assessment:         G12.21   Amyotrophic lateral sclerosis       J20.9   Acute bronchitis, unspecified       Z23   Encounter for immunization       M62.838   Other muscle spasm           ORDERS:         Meds Prescribed:       [Recorded] azithromycin 250 mg oral tablet [take 2 po day 1, then 1 tablet (250 mg) by oral route once daily for next 4 days]       [Recorded] Singulair 10 mg oral tablet [take 1 tablet (10 mg) by oral route once daily in the evening]       [Recorded] Mucinex DM  mg oral Tablet, Extended Release 12 hr [take 1 - 2 tablets by oral route every 12 hours as needed]       [Refilled] azithromycin 250 mg oral tablet [take 2 po day 1, then 1 tablet (250 mg) by oral route once daily  for next 4 days], #6 (six) tablets, Refills: 0 (zero)       [Refilled] Singulair 10 mg oral tablet [take 1 tablet (10 mg) by oral route once daily in the evening], #90 (ninety) tablets, Refills: 0 (zero)       [Refilled] Mucinex DM  mg oral Tablet, Extended Release 12 hr [take 1 - 2 tablets by oral route every 12 hours as needed], #60 (sixty) tablets, Refills: 0 (zero)         Procedures Ordered:       [id number]  Pneumococcal polysaccharide vaccine, 23-valent, adult or immunosuppressed patient dosage, for use in individuals 2 years or older, for subcutaneous or intramuscular use  (In-House)              Other Orders:         Administration of pneumococcal vaccine  (x1)                  Plan:         Amyotrophic lateral sclerosisNeurology is recommending she get another sleep study to eval with capnography to help qualify her for NIV with this dx and get her on bipap or AVAP.  She also needs another FVC study to include MIP and or MEP.  Neurology said they are going to set her up for overnight Cap/Ox study to expedite her getting the appropriate treatment.         Neurology has set her up for a power motility chair.     Neurology is scheduling her for a complete initial eval by the ALS clinic team and speech therapy to eval her need for MBSS, testing for SPEP +/- a 24'UPEP        I am ok signing off on her having a lyme test to further eval the etiology of lyme disease        Acute bronchitis, unspecifiedI will contact her pulmonologist to ask about ordering her a cough assist machine for her breathing with her ALS, she is having trouble coughing up phlegm from her bronchus/broncial tubes and the vest is not helping (used this for 3 weeks)-he is at Dr Delia Hernandez.[facility]          Prescriptions:       [Recorded] azithromycin 250 mg oral tablet [take 2 po day 1, then 1 tablet (250 mg) by oral route once daily for next 4 days]       [Recorded] Singulair 10 mg oral tablet [take 1 tablet (10 mg) by oral route once  daily in the evening]       [Recorded] Mucinex DM  mg oral Tablet, Extended Release 12 hr [take 1 - 2 tablets by oral route every 12 hours as needed]       [Refilled] azithromycin 250 mg oral tablet [take 2 po day 1, then 1 tablet (250 mg) by oral route once daily for next 4 days], #6 (six) tablets, Refills: 0 (zero)       [Refilled] Singulair 10 mg oral tablet [take 1 tablet (10 mg) by oral route once daily in the evening], #90 (ninety) tablets, Refills: 0 (zero)       [Refilled] Mucinex DM  mg oral Tablet, Extended Release 12 hr [take 1 - 2 tablets by oral route every 12 hours as needed], #60 (sixty) tablets, Refills: 0 (zero)         Encounter for immunization        IMMUNIZATIONS given today: Pneumovax.            Immunizations:       62190  Pneumococcal polysaccharide vaccine, 23-valent, adult or immunosuppressed patient dosage, for use in individuals 2 years or older, for subcutaneous or intramuscular use  (In-House)                Dose (ml): 0.5  Site: left deltoid  Route: intramuscular  Administered by: Michelle Alexis          : [employer]  Lot #: [id number]  Exp: 05/26/2022          NDC: [address]-24        Administration of pneumococcal vaccine  (x1)          Other muscle spasmwill hold off on muscle relaxants at this time, she wants baclofen, Im afraid this will suppress her breathing. May try methocarbamol            Charge Capture:         Primary Diagnosis:     G12.21  Amyotrophic lateral sclerosis           Orders:      [id number]  Office/outpatient visit; established patient, level 4  (In-House)              J20.9  Acute bronchitis, unspecified     Z23  Encounter for immunization           Orders:      [id number]  Pneumococcal polysaccharide vaccine, 23-valent, adult or immunosuppressed patient dosage, for use in individuals 2 years or older, for subcutaneous or intramuscular use  (In-House)              Administration of pneumococcal vaccine  (x1)          M62.838   Other muscle spasm         ADDENDUMS:      ____________________________________    Addendum: 01/31/2021 01:59 PM - Belkis Berkowitz        Exams:     PHYSICAL EXAM:     GENERAL thin, frail;  no apparent distress;     EYES: PERRL, EOMI     E/N/T:  OROPHARYNX:  normal mucosa, dentition, gingiva, and posterior pharynx;     NECK:  supple, full ROM; no thyromegaly; no carotid bruits;     RESPIRATORY: normal respiratory rate and pattern with no distress; rhonchi in upper lobes B    CARDIOVASCULAR: normal rate; rhythm is regular;     Peripheral Pulses: popliteal: 2+ R;  dorsalis pedis: 2+ R;  posterior tibial: 2+ R;  cyanosis noted in distal foot;     GASTROINTESTINAL: nontender, nondistended; no hepatosplenomegaly or masses; no bruits;     MUSCULOSKELETAL: gait: wheelchair-bound;  R foot drop;     NEUROLOGIC: mental status: alert and oriented x 3; GROSSLY INTACT     PSYCHIATRIC: depressed mood

## 2021-05-18 NOTE — PROGRESS NOTES
Juanita Morelos  1955     Office/Outpatient Visit    Visit Date: Mon, Aug 24, 2020 02:24 pm    Provider: Gerard Sarah MD (Assistant: Dianne Michel LPN)    Location: [facility]        Electronically signed by Gerard Sarah MD on  2020 03:16:11 PM                             Subjective:        CC: Juanita Lee is a 65 year old White female.  The patient is accompanied into the exam room by her family member and whose name is sister, Paola.  wheezing , prod ghreen cough at times, no fever;         HPI: I do see from review of chart where she recently was in hospital with femur fracture.  Has been home at her sister's since .          Acute upper respiratory infection, unspecified noted.  These have been present for the past 2 weeks.  The symptoms include cough, nasal discharge and purulent sputum production.  She denies fever.  She denies exposure to ill contacts.  Has been staying with her sister and any visitors wear mask.     ROS:     CONSTITUTIONAL:  Negative for chills, fatigue, fever, and weight change.      EYES:  Negative for blurred vision.      CARDIOVASCULAR:  Negative for chest pain, orthopnea, paroxysmal nocturnal dyspnea and pedal edema.      RESPIRATORY:  Negative for dyspnea.      GASTROINTESTINAL:  Positive for nausea.   Negative for abdominal pain, constipation, diarrhea or vomiting.      GENITOURINARY:  Negative for dysuria and frequent urination.      NEUROLOGICAL:  Negative for dizziness, headaches, paresthesias, and weakness.      PSYCHIATRIC:  Negative for anxiety, depression, and sleep disturbances.          Past Medical History / Family History / Social History:         Last Reviewed on 2020 11:49 AM by Belkis Berkowitz    Past Medical History:                 PAST MEDICAL HISTORY         Gastroesophageal Reflux Disease     Osteoarthritis    Osteoporosis     hypoglycemia     vit D def         GYNECOLOGICAL HISTORY:             CURRENT MEDICAL  PROVIDERS:    Ophthalmologist: ALVA/AAYUSH    Orthopedist: DONNA/NIVIA    VASCULAR-DR EUGENIA HANKINS         PREVENTIVE HEALTH MAINTENANCE             BONE DENSITY: was last done 3-23-16 with the following abnormality noted-- osteoporosis     COLORECTAL CANCER SCREENING: Up to date (colonoscopy q10y; sigmoidoscopy q5y; Cologuard q3y) was last done 9/3/2010, Results are in chart; colonoscopy with the following abnormalities noted-- diverticulosis     MAMMOGRAM: Done within last 2 years and results in are chart was last done 18 with normal results     Prolia Injection : 1st injection at [facility] 19         PAST MEDICAL HISTORY                 ADVANCED DIRECTIVES: None         Surgical History:         Appendectomy: ;     Cholecystectomy: ;     Hysterectomy: ;     OOPHRECTOMY     Cataract Removal:  surgeriescomplications?;      section: X 2;     Joint Replacement: knee; ;         Family History:         Positive for Hyperlipidemia ( mother ) and Myocardial Infarction ( father ).      Positive for Breast Cancer ( mat. aunt ), Colon Cancer ( mat. uncle ) and Lung Cancer ( mat. uncle; mat. aunt ).      Positive for pulmonary fibrosis=- mat uncle.      Positive for Alzheimer's Disease ( mat. uncle ) and Cerebrovascular Accident ( mat. uncle ).          Social History:     Occupation:    Disabled     Marital Status:      Children: 3 children         Tobacco/Alcohol/Supplements:     Last Reviewed on 2020 11:17 AM by Spurling, Sarah C    Tobacco: She has a past history of cigarette smoking; quit date:  .  Non-drinker         Substance Abuse History:     Last Reviewed on 2020 03:14 PM by Wale Puente        Mental Health History:     Last Reviewed on 2020 03:14 PM by Wale Puente        Communicable Diseases (eg STDs):     Last Reviewed on 2020 03:14 PM by Wale Puente        Allergies:     Last Reviewed on 2020 02:29 PM by Dianne Michel     Actonel: Myalgia  (Adverse Reaction)    Macrobid:      Morphine Sulfate: Nausea,tachycardia         Current Medications:     Last Reviewed on 8/24/2020 02:34 PM by Dianne Michel    latanoprost 0.005 % ophthalmic (eye) Drops [instill 1 drop into affected eye(s) by ophthalmic route once daily inthe evening]    melatonin 3 mg oral tablet    Alaway 0.025 % (0.035 %) ophthalmic (eye) Drops [instill 1 drop into affected eye(s) by ophthalmic route 2 times per day]    ferrous sulfate 325 mg (65 mg iron) oral tablet [take 1 tablet (325 mg) by oral route 1 time per day]    Multivitamin/Mineral Supplement  Tablet [Take 1 tablet(s) by mouth daily]    cholecalciferol (vitamin D3) 125 mcg (5,000 unit) oral capsule [1 tabs daily]    Omeprazole 40 mg oral capsule,delayed release (enteric coated) [1 capsule daily]    Synthroid 0.075mg Tablet [Take 1 tablet(s) by mouth daily]    Ibuprofen 200 mg oral tablet [prn]    Diazepam 5 mg oral tablet [1/2 to 1 PO BID PRN]    ProAir HFA 90 mcg/actuation Inhalation HFA Aerosol Inhaler    Timolol Maleate 0.5 % ophthalmic (eye) Drops [1 drop both eye daily]    Prolia 60 mg/mL subcutaneous Syringe [inject 1 milliliter (60 mg) by subcutaneous route every 6 months ]    DORZOL/TIMOL SOL 22.3-6.8     ONDANSETRON  TAB 8MG         Objective:        Vitals:         Current: 8/24/2020 2:28:57 PM    Ht:  5 ft, 2.75 inT: 98.5 F (temporal);  BP: 141/60 mm Hg (left arm, sitting);  P: 91 bpm (left arm (BP Cuff), sitting);  sCr: 0.64 mg/dL;  GFR: 87.19O2 Sat: 97 % (room air)        Exams:     PHYSICAL EXAM:     GENERAL: thin;  well groomed;  no apparent distress;     EYES: nonicteric;     E/N/T: EARS:  normal external auditory canals and tympanic membranes;  grossly normal hearing; OROPHARYNX:  normal mucosa, dentition, gingiva, and posterior pharynx;     NECK:  supple, full ROM; no thyromegaly; no carotid bruits;     RESPIRATORY: normal appearance and symmetric expansion of chest wall; normal respiratory rate and  pattern with no distress; normal breath sounds with no rales, rhonchi, wheezes or rubs;     CARDIOVASCULAR: normal rate; rhythm is regular;  no systolic murmur;     MUSCULOSKELETAL: no pedal edema;     NEUROLOGIC: no focal lateralizing deficits.;     PSYCHIATRIC:  appropriate affect and demeanor; normal speech pattern; grossly normal memory;         Assessment:         J20.9   Acute bronchitis, unspecified           ORDERS:         Meds Prescribed:       [New Rx] Zithromax Z-Yadiel 250 mg oral tablet [take 2 initially then 1 tablet (250 mg) by oral route once daily], #6 (six) tablets, Refills: 0 (zero)                 Plan:         Acute bronchitis, unspecifiedI was going to covid test her, but she says she has had 6 previous tests- the last in July- so declines testing today.  If develop significant shortness of breath, chest pain, cyanosis go to ER. Symptoms can change abruptly.  If any other concerns call back.          Prescriptions:       [New Rx] Zithromax Z-Yadiel 250 mg oral tablet [take 2 initially then 1 tablet (250 mg) by oral route once daily], #6 (six) tablets, Refills: 0 (zero)             Charge Capture:         Primary Diagnosis:     J20.9  Acute bronchitis, unspecified           Orders:      [id number]  Office/outpatient visit; established patient, level 3  (In-House)

## 2021-05-18 NOTE — PROGRESS NOTES
Juanita MorelosMaxine 1955     Office/Outpatient Visit    Visit Date:  10:58 am    Provider: Radames Purcell N.P. (Assistant: Juana Landers LPN)    Location: [facility]        Electronically signed by Radames Purcell N.P. on  2018 12:18:10 PM                             SUBJECTIVE:        CC: ear pain, nose bleeds, and needs Vit D sent         HPI:         Ear pain noted.      Juanita Lee complains of right ear pain.  Associated symptoms include dry cough.  She denies diminished hearing, ear drainage, fever, headache, itchy nose, sneezing or ringing.  The symptoms began 3 days ago.  Pertinent medical history is unremarkable.  She has not tried any medications for symptomatic relief.      ROS:     CONSTITUTIONAL:  Positive for Hx Vit D def. Suppose to be taking Vit D 10,000 units orally daily but needs script sent to [facility] instead.      E/N/T:  Positive for ear pain ( right ) and frequent epistaxis ( in last few days since staying with her Mom, the air is dry ).      CARDIOVASCULAR:  Negative for chest pain, orthopnea, paroxysmal nocturnal dyspnea and pedal edema.      RESPIRATORY:  Positive for recent cough ( typically dry; occasional dry cough ).   Negative for dyspnea.      GASTROINTESTINAL:  Negative for abdominal pain, constipation, diarrhea, nausea and vomiting.          PMH/FMH/SH:     Last Reviewed on 2018 11:23 AM by Radames Purcell    Past Medical History:                 PAST MEDICAL HISTORY         Gastroesophageal Reflux Disease     Osteoarthritis    Osteoporosis    chronic knee and back pain, acute shoulder pain     hypoglycemia     vit D def         GYNECOLOGICAL HISTORY:             CURRENT MEDICAL PROVIDERS:    Ophthalmologist: ALVA/BAR    Orthopedist: DONNA    VASCULAR-DR EUGENIA HANKINS         PREVENTIVE HEALTH MAINTENANCE         Patient confirms that she EGD 2010.      BONE DENSITY: was last done 3-23-16 with the following abnormality  noted-- osteoporosis     COLONOSCOPY: Done within the last 10 years was last done 2010 with the following abnormalities noted-- diverticulosis     MAMMOGRAM: was last done 3-23-16 with normal results scheduled in 2017/dmt         Surgical History:         Appendectomy: ;     Cholecystectomy: ;     Hysterectomy: ;     OOPHRECTOMY      Cataract Removal: / surgeriescomplications?;      section: X 2;     Joint Replacement: knee; ;         Family History:         Positive for Hyperlipidemia ( mother ) and Myocardial Infarction ( father ).      Positive for Breast Cancer ( mat. aunt ), Colon Cancer ( mat. uncle ) and Lung Cancer ( mat. uncle; mat. aunt ).      Positive for pulmonary fibrosis=- mat uncle.      Positive for Alzheimer's Disease ( mat. uncle ) and Cerebrovascular Accident ( mat. uncle ).          Social History:     Occupation:    Disabled     Marital Status:      Children: 3 children         Tobacco/Alcohol/Supplements:     Last Reviewed on 2018 11:23 AM by Radames Purcell    Tobacco: She has a past history of cigarette smoking; quit date:  .  Non-drinker             Current Problems:     Last Reviewed on 2018 11:23 AM by Radames Purcell    Chronic insomnia     Use of high risk medications     Acquired hypothyroidism, other specified cause     Serous otitis media     Osteoporosis, unspecified     Actinic keratosis     Varicose veins of lower extremities, with Pain     Tension Headache     Unexplained weight loss     Osteoporosis     Acquired hypothyroidism     Essential hypercholesterolemia     Chronic otitis media (OM) with effusion     Generalized anxiety disorder     Vitamin D deficiency     GERD     Herniated disc     Artificial joint replacement, Knee     Cataract     Post-menopausal HRT     Dry skin syndrome     Cerumen impaction     Nosebleed     Ear pain     Screening for colorectal cancer     Headache         Immunizations:      None        Allergies:     Last Reviewed on 1/04/2018 11:23 AM by Radames Purcell    Actonel: Myalgia (Adverse Reaction)    Morphine Sulfate: nausea, tachycardia        Current Medications:     Last Reviewed on 1/04/2018 11:23 AM by Radames Purcell    Fluticasone Propionate 50mcg/1actuation Nasal Spray 1 spray each nostil daily     Naprosyn 500mg Tablet Take 1 tablet(s) by mouth bid  prn     Synthroid 0.075mg Tablet Take 1 tablet(s) by mouth daily     Multivitamin/Mineral Supplement Tablet Take 1 tablet(s) by mouth daily     Diazepam 5mg Tablet 1/2 to 1 PO BID PRN     citracal + d     Vitamin D3 5,000IU Capsules     Ibuprofen 200mg Tablet prn     PROBIOTIC QD         OBJECTIVE:        Vitals:         Current: 1/4/2018 11:01:27 AM    Ht:  5 ft, 2.75 in;  Wt: 101.2 lbs;  BMI: 18.1    T: 97.6 F (oral);  BP: 147/76 mm Hg (left arm, sitting);  P: 92 bpm (left arm (BP Cuff), sitting);  sCr: 0.74 mg/dL;  GFR: 65.36        Exams:     PHYSICAL EXAM:     GENERAL: vital signs recorded - well developed, well nourished;  no apparent distress;     E/N/T: EARS: external auditory canal occluded by cerumen on the right;  after irrigation right ear TM wnl; NOSE: nasal mucosa is erythematous;     RESPIRATORY: normal respiratory rate and pattern with no distress; normal breath sounds with no rales, rhonchi, wheezes or rubs;     CARDIOVASCULAR: normal rate; rhythm is regular;  no systolic murmur; no edema;     GASTROINTESTINAL: nontender; normal bowel sounds; no organomegaly;         Procedures:     Cerumen impaction     Cerumen impaction is noted in the right ear The degree of wax accumulation is moderate.  With syringe irrigation, the wax is removed.  Removed from ear was hard balls of wax.  The patient tolerated the procedure reasonably well.  Complications were discomfort.  Performed by:calderon             ASSESSMENT:           388.70   H92.01  Ear pain              DDx:     784.7   R04.0  Nosebleed              DDx:     380.4    H61.21  Cerumen impaction              DDx:     268.9   E55.9  Vitamin D deficiency              DDx:         ORDERS:         Meds Prescribed:       Sodium Chloride 0.65% Nasal Solution 1 squirt each nostril BID  #50 (Fifty) ml Refills: 2       Refill of: Vitamin D3 5,000IU Capsules 2 tabs daily  #60 (Sixty) capsule(s) Refills: 3         Procedures Ordered:       [id number]  Removal of impacted cerumen right ear (NURSE)  (In-House)                   PLAN:          Nosebleed           Prescriptions:       Sodium Chloride 0.65% Nasal Solution 1 squirt each nostril BID  #50 (Fifty) ml Refills: 2          Cerumen impaction         TESTS/PROCEDURES:  Will proceed with Cerumen Removal--by Nurse: Right ear, to be performed/scheduled now.            Orders:       [id number]  Removal of impacted cerumen right ear (NURSE)  (In-House)            Vitamin D deficiency           Prescriptions:       Refill of: Vitamin D3 5,000IU Capsules 2 tabs daily  #60 (Sixty) capsule(s) Refills: 3             CHARGE CAPTURE:           Primary Diagnosis:     388.70 Ear pain            H92.01    Otalgia, right ear              Orders:          [id number]  Office/outpatient visit; established patient, level 3  (In-House)           784.7 Nosebleed            R04.0    Epistaxis    380.4 Cerumen impaction            H61.21    Impacted cerumen, right ear              Orders:          [id number]   Removal of impacted cerumen right ear (NURSE)  (In-House)           268.9 Vitamin D deficiency            E55.9    Vitamin D deficiency, unspecified

## 2021-05-18 NOTE — PROGRESS NOTES
Juanita Morelos  1955     Office/Outpatient Visit    Visit Date:  02:48 pm    Provider: Belkis Berkowitz MD (Assistant: Santa Winston MA)    Location: [facility]        Electronically signed by Belkis Berkowitz MD on  2020 02:29:46 PM                             Subjective:        CC: TELEHEALTH- CONSENT BY MNP, PRODUCTIVE COUGH, WEAK, TIRED(PT IS TAKING TRAZADONE & SERTRALINE QOD)cough    HPI:           Acute bronchitis noted.  It has been present for the past 3 days.  Respiratory symptoms include chest congestion, progressive, productive cough and wheezing.   She denies sinus pressure or shortness of breath.  Other symptoms include headache, nasal discharge, sinus pain/pressure and nausea with abdominal cramps and diarrhea.  She denies body aches, edema, fever or nasal congestion.  productive cough     ROS:     CONSTITUTIONAL:  Positive for fatigue.   Negative for chills or fever.      EYES:  Negative for blurred vision.      CARDIOVASCULAR:  Negative for chest pain, orthopnea, paroxysmal nocturnal dyspnea and pedal edema.      RESPIRATORY:  Positive for dyspnea and frequent wheezing.   Negative for recent cough.      GASTROINTESTINAL:  Negative for abdominal pain, constipation, diarrhea, nausea and vomiting.      MUSCULOSKELETAL:  Positive for ankle pain.      INTEGUMENTARY/BREAST:  Negative for rash.      PSYCHIATRIC:  Negative for anxiety, depression, and sleep disturbances.          Past Medical History / Family History / Social History:         Last Reviewed on 2020 03:45 PM by Belkis Berkowitz    Past Medical History:                 PAST MEDICAL HISTORY         Gastroesophageal Reflux Disease     Osteoarthritis    Osteoporosis     hypoglycemia     vit D def         GYNECOLOGICAL HISTORY:             CURRENT MEDICAL PROVIDERS:    Ophthalmologist: ALVA/AAYUSH    Orthopedist: DONNA/NIVIA    VASCULAR-DR EUGENIA HANKINS         PREVENTIVE HEALTH  MAINTENANCE             BONE DENSITY: was last done 3-23-16 with the following abnormality noted-- osteoporosis     COLORECTAL CANCER SCREENING: Up to date (colonoscopy q10y; sigmoidoscopy q5y; Cologuard q3y) was last done 9/3/2010, Results are in chart; colonoscopy with the following abnormalities noted-- diverticulosis     MAMMOGRAM: Done within last 2 years and results in are chart was last done 18 with normal results     Prolia Injection : 1st injection at [facility] 19         PAST MEDICAL HISTORY                 ADVANCED DIRECTIVES: None         Surgical History:         Appendectomy: ;     Cholecystectomy: ;     Hysterectomy: ;     OOPHRECTOMY     Cataract Removal:  surgeriescomplications?;      section: X 2;     Joint Replacement: knee; ;         Family History:         Positive for Hyperlipidemia ( mother ) and Myocardial Infarction ( father ).      Positive for Breast Cancer ( mat. aunt ), Colon Cancer ( mat. uncle ) and Lung Cancer ( mat. uncle; mat. aunt ).      Positive for pulmonary fibrosis=- mat uncle.      Positive for Alzheimer's Disease ( mat. uncle ) and Cerebrovascular Accident ( mat. uncle ).          Social History:     Occupation:    Disabled     Marital Status:      Children: 3 children         Tobacco/Alcohol/Supplements:     Last Reviewed on 2020 02:49 PM by Santa Winston    Tobacco: She has a past history of cigarette smoking; quit date:  .  Non-drinker         Substance Abuse History:     Last Reviewed on 2020 03:14 PM by Wale Puente        Mental Health History:     Last Reviewed on 2020 03:14 PM by Wale Puente        Communicable Diseases (eg STDs):     Last Reviewed on 2020 03:14 PM by Wale Puente        Allergies:     Last Reviewed on 3/24/2020 09:48 AM by Santa Winston    Actonel: Myalgia  (Adverse Reaction)    Macrobid:      Morphine Sulfate: Nausea,tachycardia         Current  Medications:     Last Reviewed on 3/20/2020 03:46 PM by Spurling, Sarah C    latanoprost 0.005 % ophthalmic (eye) Drops [instill 1 drop into affected eye(s) by ophthalmic route once daily inthe evening]    hormone essentials     menopause essentials     Fish Oil 360-1,200 mg oral capsule    melatonin 3 mg oral tablet    Alaway 0.025 % (0.035 %) ophthalmic (eye) Drops [instill 1 drop into affected eye(s) by ophthalmic route 2 times per day]    Multivitamin/Mineral Supplement  Tablet [Take 1 tablet(s) by mouth daily]    cholecalciferol (vitamin D3) 5,000 unit oral capsule [2 tabs daily]    Omeprazole 40 mg oral capsule,delayed release (enteric coated) [1 capsule daily]    Synthroid 0.075mg Tablet [Take 1 tablet(s) by mouth daily]    Ibuprofen 200 mg oral tablet [prn]    Diazepam 5 mg oral tablet [1/2 to 1 PO BID PRN]    ProAir HFA 90 mcg/actuation Inhalation HFA Aerosol Inhaler    Timolol Maleate 0.5 % ophthalmic (eye) Drops [1 drop both eye daily]    Prolia 60 mg/mL subcutaneous Syringe [inject 1 milliliter (60 mg) by subcutaneous route every 6 months ]    traZODone 50 mg oral tablet [take 1 tablet (50 mg) by oral route QHS]    sertraline 25 mg oral tablet [take 1 tablet (25 mg) by oral route once daily]    FUROSEMIDE   TAB 20MG    POT CHLORIDE CAP 10MEQ ER    MELOXICAM    TAB 7.5MG    DORZOL/TIMOL SOL 22.3-6.8    LATANOPROST  SOL 0.005%    ONDANSETRON  TAB 8MG        Objective:        Vitals:         Current: 4/1/2020 3:43:30 PM    Ht:  5 ft, 2.75 inT: 98.6 F (oral);  BP: 144/66 mm Hg (right arm, sitting);  P: 81 bpm (right arm (BP Cuff), sitting);  sCr: 0.64 mg/dL;  GFR: 88.31        Assessment:         J20   Acute bronchitis           ORDERS:         Meds Prescribed:       [Recorded] clarithromycin 500 mg oral tablet [take 1 tablet (500 mg) by oral route 2 times per day]       [Recorded] brompheniramine-pseudoeph-DM 2-30-10 mg/5 mL oral Syrup [take 5-10 milliliters by oral route every 4-6 hours]       [Refilled]  clarithromycin 500 mg oral tablet [take 1 tablet (500 mg) by oral route 2 times per day], #20 (twenty) tablets, Refills: 0 (zero)       [Refilled] brompheniramine-pseudoeph-DM 2-30-10 mg/5 mL oral Syrup [take 5-10 milliliters by oral route every 4-6 hours], #180 (one hundred and eighty) milliliters, Refills: 0 (zero)                 Plan:         Acute bronchitisPt told she needs to be seen if her symptoms worsen, jr if she gets SOA, I am worried about covid only because she was in the hospital 2 weeks ago and I cannot rule this out with a phone visit, she verbalizes she understands, I will treat her for bronchitis with biaxin.         RECOMMENDATIONS given include: Push Fluids, Rest, Follow up if no improvement or worsening symptoms like high fevers, vomiting, weakness, or increasing shortness of air.    .  Telehealth: Verbal consent obtained for visit to occur via phone call; Staff, other than provider, present during telephone visit include Santa Winston; Total time spent was 7 minutes; [id number]--Mkvymanrv E/M 5-10 minutes           Prescriptions:       [Recorded] clarithromycin 500 mg oral tablet [take 1 tablet (500 mg) by oral route 2 times per day]       [Recorded] brompheniramine-pseudoeph-DM 2-30-10 mg/5 mL oral Syrup [take 5-10 milliliters by oral route every 4-6 hours]       [Refilled] clarithromycin 500 mg oral tablet [take 1 tablet (500 mg) by oral route 2 times per day], #20 (twenty) tablets, Refills: 0 (zero)       [Refilled] brompheniramine-pseudoeph-DM 2-30-10 mg/5 mL oral Syrup [take 5-10 milliliters by oral route every 4-6 hours], #180 (one hundred and eighty) milliliters, Refills: 0 (zero)             Charge Capture:         Primary Diagnosis:     J20  Acute bronchitis           Orders:      [id number]  Phys/[facility] telephone evaluation 5-10 min  (In-House)

## 2021-05-18 NOTE — PROGRESS NOTES
Juanita Morelos 1955     Office/Outpatient Visit    Visit Date:  11:51 am    Provider: Belkis Berkowitz MD (Assistant: Marian Landers MA)    Location: [facility]        Electronically signed by Belkis Berkowitz MD on  2018 01:14:47 PM                             SUBJECTIVE:        CC: abdominal pain         HPI:     acute abdominal pain in mid abdomen, worse with eating, jr with spicey food, pain is mild, but she  is not eating well and her wt is down to 98 #'s, she has a h/o PUD.  She is on zantac OTC daily. She has a lot of stress on her at this time.  H/O cholecystectomy and hysterectomy     acute low back pain, she is worried she has another UTI, pain is worse with activity and is mild and intermittent     small sore on her cheek, onset 2 weeks ago, not painful     ROS:     CONSTITUTIONAL:  Positive for fatigue.   Negative for fever.      EYES:  Negative for blurred vision.      CARDIOVASCULAR:  Negative for chest pain, palpitations, tachycardia, orthopnea, and edema.      RESPIRATORY:  Negative for recent cough, dyspnea and frequent wheezing.      GASTROINTESTINAL:  Positive for abdominal pain.   Negative for constipation, diarrhea, hematochezia, melena, nausea or vomiting.      MUSCULOSKELETAL:  Negative for back pain.      INTEGUMENTARY/BREAST:  Negative for rash.      NEUROLOGICAL:  Negative for dizziness, headaches, paresthesias, and weakness.      PSYCHIATRIC:  Positive for insomnia.   Negative for crying spells, depression or suicidal thoughts.          PMH/FMH/SH:     Last Reviewed on 2018 12:18 PM by Belkis Berkowitz    Past Medical History:                 PAST MEDICAL HISTORY         Gastroesophageal Reflux Disease     Osteoarthritis    Osteoporosis    chronic knee and back pain, acute shoulder pain     hypoglycemia     vit D def         GYNECOLOGICAL HISTORY:             CURRENT MEDICAL PROVIDERS:    Ophthalmologist: ALVA/AAYUSH    Orthopedist:  DONNA    VASCULAR-DR EUGENIA HANKINS         PREVENTIVE HEALTH MAINTENANCE         Patient confirms that she EGD 2010.      BONE DENSITY: was last done 3-23-16 with the following abnormality noted-- osteoporosis     COLONOSCOPY: Done within the last 10 years was last done 2010 with the following abnormalities noted-- diverticulosis     MAMMOGRAM: was last done 3-23-16 with normal results scheduled in 2017/dmt         Surgical History:         Appendectomy: ;     Cholecystectomy: ;     Hysterectomy: ;     OOPHRECTOMY      Cataract Removal: / surgeriescomplications?;      section: X 2;     Joint Replacement: knee; ;         Family History:         Positive for Hyperlipidemia ( mother ) and Myocardial Infarction ( father ).      Positive for Breast Cancer ( mat. aunt ), Colon Cancer ( mat. uncle ) and Lung Cancer ( mat. uncle; mat. aunt ).      Positive for pulmonary fibrosis=- mat uncle.      Positive for Alzheimer's Disease ( mat. uncle ) and Cerebrovascular Accident ( mat. uncle ).          Social History:     Occupation:    Disabled     Marital Status:      Children: 3 children         Tobacco/Alcohol/Supplements:     Last Reviewed on 2018 12:18 PM by Belkis Berkowitz    Tobacco: She has a past history of cigarette smoking; quit date:  .  Non-drinker         Substance Abuse History:     Last Reviewed on 2018 11:23 AM by Radames Purcell        Mental Health History:     Last Reviewed on 2018 11:23 AM by Radames Purcell        Communicable Diseases (eg STDs):     Last Reviewed on 2018 11:23 AM by Radames Purcell            Allergies:     Last Reviewed on 2018 01:50 PM by Marian Landers    Actonel: Myalgia (Adverse Reaction)    Morphine Sulfate: nausea, tachycardia        Current Medications:     Last Reviewed on 2018 01:51 PM by Marian Landers    Sodium Chloride 0.65% Nasal Solution 1 squirt each nostril BID     Vitamin D3  5,000IU Capsules 2 tabs daily     Fluticasone Propionate 50mcg/1actuation Nasal Spray 1 spray each nostil daily     Naprosyn 500mg Tablet Take 1 tablet(s) by mouth bid  prn     Synthroid 0.075mg Tablet Take 1 tablet(s) by mouth daily     Multivitamin/Mineral Supplement Tablet Take 1 tablet(s) by mouth daily     Bromfed-DM 2mg/10mg/30mg per 5ml Syrup 1  to 2  tsp po every 4-6 hrs prn cough/congestion.     citracal + d     Ibuprofen 200mg Tablet prn     PROBIOTIC QD         OBJECTIVE:        Vitals:         Current: 2/14/2018 11:54:15 AM    Ht:  5 ft, 2.75 in;  Wt: 98 lbs;  BMI: 17.5    T: 97.5 F (oral);  BP: 131/72 mm Hg (left arm, sitting);  P: 100 bpm (left arm (BP Cuff), sitting);  sCr: 0.74 mg/dL;  GFR: 64.47        Exams:     PHYSICAL EXAM:     GENERAL: well developed, well nourished;  no apparent distress;     EYES: PERRL, EOMI     E/N/T: EARS: external auditory canal normal;  both TMs are distorted, dull, and have fluid behind them;  OROPHARYNX: oral mucosa is normal; posterior pharynx shows no exudate;     NECK: supple, FROM, no LAD;     RESPIRATORY: normal respiratory rate and pattern with no distress; normal breath sounds with no rales, rhonchi, wheezes or rubs;     CARDIOVASCULAR: normal rate; rhythm is regular;     GASTROINTESTINAL: mild epigastric tenderness;  normal bowel sounds; no organomegaly; no masses;     BREAST/INTEGUMENT: SKIN-4 mm atypical skin lesion c/w BCC;     MUSCULOSKELETAL: normal gait; normal range of motion of all major muscle groups;     NEUROLOGIC: mental status: alert and oriented x 3; GROSSLY INTACT         ASSESSMENT           789.00   R10.13  Abdominal pain, unspecified              DDx:     724.2   M54.5  Low back pain              DDx:     238.2   D48.5  Atypical skin lesion              DDx:     388.70   H92.01  Ear pain              DDx:         ORDERS:         Lab Orders:       81853  AMYS - [facility] Amylase, Serum  (Send-Out)         23155  BDCB2 - [facility] CBC w/o diff  (Send-Out)          72254  COMP - [facility] Comp. Metabolic Panel  (Send-Out)         08909  HPUBT - [facility] H.pylori Breath test  (Send-Out)         16433  LIP - [facility] Lipase, Serum  (Send-Out)         48395  BDUAM - [facility] Urinalysis, automated, with micro  (Send-Out)           Procedures Ordered:       REFER  Referral to Specialist or Other Facility  (Send-Out)                   PLAN:          Abdominal pain, unspecified will change zantac to omeprazole, and check her for another ulcer, screen pancrease labs, she is to f/u if no improvement, worsening symptoms (jr abd pain/V), or melena/BRBPR     LABORATORY:  Labs ordered to be performed today include amylase, CBC W/O DIFF, Comprehensive metabolic panel, H.pylori urea breath test ([facility]), Lipase, and urinalysis with micro.            Orders:       06415  AMYS - [facility] Amylase, Serum  (Send-Out)         31186  BDCB2 - [facility] CBC w/o diff  (Send-Out)         86802  COMP - [facility] Comp. Metabolic Panel  (Send-Out)         23920  HPUBT - [facility] H.pylori Breath test  (Send-Out)         17206  LIP - [facility] Lipase, Serum  (Send-Out)         72325  BDUAM - [facility] Urinalysis, automated, with micro  (Send-Out)            Low back pain will r/o UTI-check UA micro          Atypical skin lesion susp for a BCC-will refer her asap to NP at Dr Liu office to eval         REFERRALS:  Referral initiated to a dermatologist ( Dr. Maria Isabel Benítez ).            Orders:       REFER  Referral to Specialist or Other Facility  (Send-Out)            Ear pain well worried             CHARGE CAPTURE           **Please note: ICD descriptions below are intended for billing purposes only and may not represent clinical diagnoses**        Primary Diagnosis:         789.00 Abdominal pain, unspecified            R10.13    Epigastric pain              Orders:          84098   Office/outpatient visit; established patient, level 4  (In-House)           724.2 Low back pain            M54.5    Low back pain    238.2 Atypical skin lesion             D48.5    Neoplasm of uncertain behavior of skin    388.70 Ear pain            H92.01    Otalgia, right ear        ADDENDUMS:      ____________________________________    Date: 02/14/2018 01:31 PM    Author: Radha Mathias         Visit Note Faxed to:        Michelle Benítez ; Number [phone]     Health Summary Faxed to:        Michelle Benítez ; Number [phone]            Date: 02/15/2018 01:27 PM    Author: Radha Mathias         Visit Note Faxed to:        Oksana Bailey  (Gastroenterology); Number [phone]     Health Summary Faxed to:        Oksana Bailey  (Gastroenterology); Number [phone]

## 2021-05-18 NOTE — PROGRESS NOTES
"Juanita Morelos 1955     Office/Outpatient Visit    Visit Date: Tue, Sep 10, 2019 12:58 pm    Provider: Belkis Berkowitz MD (Assistant: Nely Reynolds MA)    Location: [facility]        Electronically signed by Belkis Berkowitz MD on  09/12/2019 10:05:40 AM                             SUBJECTIVE:        CC: dizzy, joint pain         HPI:     diffuse all over joint pain, she is seeing Dr Michaud, he did hip and ankle xrays, her ankle showed DJD and her hip xrays were \"OK\".   She is feeling overwhelmed with grief over the loss of her mom, she is the one who found her mom dead.  She is feeling tired and her stomach is bothering her.  She only gets 1-3 hours sleep a night.          Acquired hypothyroidism details; she is currently taking Levoxyl, 75 mcg daily.  TSH was last checked 5 months ago.  The result was reported as normal.  Currently, she is experiencing fatigue.  She reports no symptoms suggestive of adverse medication effect.          Essential hypercholesterolemia details; date of diagnosis Dec 2012.  Compliance with treatment has been good.  She denies experiencing any hypercholesterolemia related symptoms.  Most recent lab tests include Vitamin D, 25-Hydroxy:  57.3 (ng/mL) (02/05/2019), Total Cholesterol:  203 (mg/dL) (02/05/2019), HDL:  85 (mg/dL) (02/05/2019), Triglycerides:  60 (mg/dL) (02/05/2019), LDL:  106 (mg/dL) (02/05/2019), Hemoglobin:  12.80 (gm/dl) (02/05/2019), Hematocrit:  39.0 (%) (02/05/2019), TSH:  2.110 (mIU/L) (02/05/2019).      ROS:     CONSTITUTIONAL:  Positive for fatigue and unintentional weight loss.   Negative for chills or fever.      EYES:  Negative for blurred vision.      E/N/T:  Positive for ear pain.   Negative for nasal congestion or frequent rhinorrhea.      CARDIOVASCULAR:  Negative for chest pain, orthopnea, paroxysmal nocturnal dyspnea and pedal edema.      RESPIRATORY:  Negative for dyspnea and frequent wheezing.      GASTROINTESTINAL:  Positive for acid reflux " symptoms, diarrhea and nausea.   Negative for abdominal pain, constipation or vomiting.      GENITOURINARY:  Negative for dysuria, nocturia, polyuria and vaginal discharge.      MUSCULOSKELETAL:  Positive for ankle pain.      NEUROLOGICAL:  Negative for dizziness, headaches, paresthesias, and weakness.      PSYCHIATRIC:  Negative for anxiety, depression, and sleep disturbances.          PMH/FMH/SH:     Last Reviewed on 9/10/2019 01:46 PM by Belkis Berkowitz    Past Medical History:                 PAST MEDICAL HISTORY         Gastroesophageal Reflux Disease     Osteoarthritis    Osteoporosis     hypoglycemia     vit D def         GYNECOLOGICAL HISTORY:             CURRENT MEDICAL PROVIDERS:    Ophthalmologist: ALVA/AAYUSH    Orthopedist: DONNA/NIVIA    VASCULAR-DR EUGENIA HANKINS         PREVENTIVE HEALTH MAINTENANCE             BONE DENSITY: was last done 3-23-16 with the following abnormality noted-- osteoporosis     COLORECTAL CANCER SCREENING: Up to date (colonoscopy q10y; sigmoidoscopy q5y; Cologuard q3y) was last done 9/3/2010, Results are in chart; colonoscopy with the following abnormalities noted-- diverticulosis     MAMMOGRAM: Done within last 2 years and results in are chart was last done 18 with normal results         PAST MEDICAL HISTORY                 ADVANCED DIRECTIVES: None         Surgical History:         Appendectomy: ;     Cholecystectomy: ;     Hysterectomy: ;     OOPHRECTOMY      Cataract Removal:  surgeriescomplications?;      section: X 2;     Joint Replacement: knee; ;         Family History:         Positive for Hyperlipidemia ( mother ) and Myocardial Infarction ( father ).      Positive for Breast Cancer ( mat. aunt ), Colon Cancer ( mat. uncle ) and Lung Cancer ( mat. uncle; mat. aunt ).      Positive for pulmonary fibrosis=- mat uncle.      Positive for Alzheimer's Disease ( mat. uncle ) and Cerebrovascular Accident ( mat. uncle ).           Social History:     Occupation:    Disabled     Marital Status:      Children: 3 children         Tobacco/Alcohol/Supplements:     Last Reviewed on 9/10/2019 01:46 PM by Belkis Berkowitz    Tobacco: She has a past history of cigarette smoking; quit date:  1970's.  Non-drinker         Substance Abuse History:     Last Reviewed on 6/27/2019 01:37 PM by Juanita Eubanks        Mental Health History:     Last Reviewed on 6/27/2019 01:37 PM by Juanita Eubanks        Communicable Diseases (eg STDs):     Last Reviewed on 6/27/2019 01:37 PM by Juanita Eubanks            Allergies:     Last Reviewed on 6/27/2019 01:37 PM by Juanita Eubanks    Actonel: Myalgia (Adverse Reaction)    Macrobid:    Morphine Sulfate: nausea, tachycardia        Current Medications:     Last Reviewed on 6/27/2019 01:37 PM by Juanita Eubanks    Diazepam 5mg Tablet 1/2 to 1 PO BID PRN     Synthroid 0.075mg Tablet Take 1 tablet(s) by mouth daily     Breo Ellipta 100mcg/25mcg Inhalation Powder Take 1 inhalation(s) by mouth daily     Fluticasone Propionate 50mcg/1actuation Nasal Spray 1 spray each nostil daily     Omeprazole 40mg Capsules, Extended Release 1 capsule daily     Sodium Chloride 0.65% Nasal Solution 1 squirt each nostril BID     Vitamin D3 5,000IU Capsules 2 tabs daily     Multivitamin/Mineral Supplement Tablet Take 1 tablet(s) by mouth daily     ProAir HFA 90mcg/1actuation Oral Inhaler     Timolol Maleate 0.5% Ophthalmic Solution 1 drop both eye daily     Ibuprofen 200mg Tablet prn     PROBIOTIC QD         OBJECTIVE:        Vitals:         Current: 9/10/2019 1:16:09 PM    Ht:  5 ft, 2.75 in;  Wt: 96 lbs;  BMI: 17.1    T: 97.9 F (oral);  BP: 125/64 mm Hg (left arm, sitting);  P: 94 bpm (left arm (BP Cuff), sitting);  sCr: 0.59 mg/dL;  GFR: 78.16        Exams:     PHYSICAL EXAM:     GENERAL: well developed, well nourished;  no apparent distress;     EYES: PERRL, EOMI     E/N/T: EARS:  normal external auditory canals and  tympanic membranes;  grossly normal hearing; OROPHARYNX:  normal mucosa, dentition, gingiva, and posterior pharynx;     NECK:  supple, full ROM; no thyromegaly; no carotid bruits;     RESPIRATORY: normal respiratory rate and pattern with no distress; normal breath sounds with no rales, rhonchi, wheezes or rubs;     CARDIOVASCULAR: normal rate; rhythm is regular;     GASTROINTESTINAL: nontender, nondistended; no hepatosplenomegaly or masses; no bruits; R inguinal-soft, easily retractable hernia present;     NEUROLOGIC: mental status: alert and oriented x 3; GROSSLY INTACT     PSYCHIATRIC: appropriate affect and demeanor;         Lab/Test Results:             Amphetamines Screen, Urin:  Negative (09/10/2019),     BAR-Barbiturates Screen, Urin:  Negative (09/10/2019),     Buprenorphine:  Negative (09/10/2019),     BZO-Benzodiazepines Screen,Ur:  Positive (09/10/2019),     Cocaine(Metab.)Screen, Ur:  Negative (09/10/2019),     MDMA-Ecstasy:  Negative (09/10/2019),     Met-Methamphetamine:  Negative (09/10/2019),     MTD-Methadone Screen, Urine:  Negative (09/10/2019),     Opiate Screen, Urine:  Negative (09/10/2019),     OXY-Oxycodone:  Negative (09/10/2019),     PCP-Phencyclidine Screen, Uri:  Negative (09/10/2019),     THC Cannabinoids Screen, Urin:  Negative (09/10/2019),     Urine temperature:  confirmed (09/10/2019),     Date and time of last pill:  diazepam 9-9-19 @9pm/ael (09/10/2019),     Performed by:  pr (09/10/2019),     Collection Time:  13:32 (09/10/2019),             ASSESSMENT           V58.69   Z79.899  Use of high risk medications              DDx:     719.49   M15.9  Diffuse arthralgia              DDx:     300.02   F41.1  Generalized anxiety disorder              DDx:     244.8   E03.8  Acquired hypothyroidism              DDx:     307.42   F51.01  Chronic insomnia              DDx:     272.0   E78.00  Essential hypercholesterolemia              DDx:     268.9   E55.9  Vitamin D deficiency,  unspecified              DDx:     530.81   K21.9  GERD              DDx:     733.09   M81.8  Osteoporosis, other              DDx:     441.2   I71.2  Thoracic aortic aneurysm, without rupture              DDx:     708.9   L50.0  Hives              DDx:     783.41   R62.7  Failure to gain weight              DDx:     V76.49   Z12.11  Screening for colorectal cancer              DDx:         ORDERS:         Meds Prescribed:       Refill of: Diazepam 5mg Tablet 1/2 to 1 PO BID PRN  #45 (Forty Five) tablet(s) Refills: 0       Refill of: Synthroid (Levothyroxine Sodium) 0.075mg Tablet Take 1 tablet(s) by mouth daily  #90 (Ninety) tablet(s) Refills: 0       Refill of: Breo Ellipta (Fluticasone Furoate/Vilanterol) 100mcg/25mcg Inhalation Powder Take 1 inhalation(s) by mouth daily  #1 (One) inhaler(s) Refills: 0       Refill of: Fluticasone Propionate 50mcg/1actuation Nasal Spray 1 spray each nostil daily  #16 (Sixteen) gm Refills: 0       Refill of: Omeprazole 40mg Capsules, Extended Release 1 capsule daily  #90 (Ninety) capsule(s) Refills: 0       Refill of: Vitamin D3 5,000IU Capsules 2 tabs daily  #60 (Sixty) capsule(s) Refills: 3       Remeron (Mirtazapine) 15mg Tablet 1 tab daily HS  #30 (Thirty) tablet(s) Refills: 0         Radiology/Test Orders:       03922  Colonoscopy, flexible; diagnostic  (Send-Out)           Lab Orders:       11411  Drug test prsmv read direct optical obs pr date  (In-House)         18058  VITD - [facility] Vitamin D, 25 Hydroxy  (Send-Out)         22454  HPUBT - [facility] H.pylori Breath test  (Send-Out)         24856  AFAP -[facility] Adult Food Allergy Profile 14390 (10x), 75180  (Send-Out)         34771  gliadin IgG antibody  (Send-Out)         63048  TSH - [facility] TSH  (Send-Out)         42667  HTNLP - [facility] CMP AND LIPID: 12596, 02111  (Send-Out)           Procedures Ordered:       REFER  Referral to Specialist or Other Facility  (Send-Out)         03763  Upper gastrointestinal endoscopy including esophagus,  stomach, and either the duodenum and/or jejunu  (Send-Out)                   PLAN:          Use of high risk medications         RECOMMENDATIONS given include: amos reviewed, drug screen performed and appropriate, consent is reviewed and signed and on the chart, she is aware of risk of addiction on this medication and understands that she will need to follow up for a review every 3 months and her medications will be adjusted or decreased as deemed appropriate at each visit.  No personal history of drug or alcohol abuse.  No concerns about diversion or abuse.  She denies side effects related to the medication.  She is  aware that she may be called in for pill counts..            Orders:       76053  Drug test prsmv read direct optical obs pr date  (In-House)            Diffuse arthralgia cont f/u with Dr Michaud          Generalized anxiety disorder worse, will start her on remeron          Acquired hypothyroidism     LABORATORY:  Labs ordered to be performed today include TSH.            Orders:       65864  TSH - [facility] TSH  (Send-Out)            Chronic insomnia will try her on remeron          Essential hypercholesterolemia     LABORATORY:  Labs ordered to be performed today include HTN/Lipid Panel: CMP, Lipid.            Orders:       35656  HTNLP - [facility] CMP AND LIPID: 56817, 16496  (Send-Out)            Vitamin D deficiency, unspecified     LABORATORY:  Labs ordered to be performed today include Vitamin D.            Orders:       12570  VITD - [facility] Vitamin D, 25 Hydroxy  (Send-Out)            GERD out of her omeprazole for past month     LABORATORY:  Labs ordered to be performed today include H.pylori urea breath test ([facility]).      TESTS/PROCEDURES:  Will proceed with EGD and with Dr Bhatia to be performed/scheduled now.            Orders:       49367  HPUBT - [facility] H.pylori Breath test  (Send-Out)         25415  Upper gastrointestinal endoscopy including esophagus, stomach, and either the duodenum and/or jejunu   (Send-Out)            Osteoporosis, other will try to get prolia approved           Prescriptions:       Refill of: Diazepam 5mg Tablet 1/2 to 1 PO BID PRN  #45 (Forty Five) tablet(s) Refills: 0       Refill of: Synthroid (Levothyroxine Sodium) 0.075mg Tablet Take 1 tablet(s) by mouth daily  #90 (Ninety) tablet(s) Refills: 0       Refill of: Breo Ellipta (Fluticasone Furoate/Vilanterol) 100mcg/25mcg Inhalation Powder Take 1 inhalation(s) by mouth daily  #1 (One) inhaler(s) Refills: 0       Refill of: Fluticasone Propionate 50mcg/1actuation Nasal Spray 1 spray each nostil daily  #16 (Sixteen) gm Refills: 0       Refill of: Omeprazole 40mg Capsules, Extended Release 1 capsule daily  #90 (Ninety) capsule(s) Refills: 0       Refill of: Vitamin D3 5,000IU Capsules 2 tabs daily  #60 (Sixty) capsule(s) Refills: 3       Remeron (Mirtazapine) 15mg Tablet 1 tab daily HS  #30 (Thirty) tablet(s) Refills: 0          Thoracic aortic aneurysm, without rupture 2.7 cm thoracic aneurysm, she will need repeat CT one year, please tickle.          Hives     LABORATORY:  Labs ordered to be performed today include Allergy Food Adult Profile.            Orders:       78202  [facility] Adult Food Allergy Profile 58014 (10x), 23677  (Send-Out)         98978  gliadin IgG antibody  (Send-Out)            Failure to gain weight         REFERRALS:  Referral initiated to an endocrinologist ( Dr. Nohelia Lewis ).            Orders:       REFER  Referral to Specialist or Other Facility  (Send-Out)            Screening for colorectal cancer         TESTS/PROCEDURES:  Will proceed with a colonoscopy to be performed/scheduled now.            Orders:       14558  Colonoscopy, flexible; diagnostic  (Send-Out)               CHARGE CAPTURE           **Please note: ICD descriptions below are intended for billing purposes only and may not represent clinical diagnoses**        Primary Diagnosis:         V58.69 Use of high risk medications            Z79.899     Other long term (current) drug therapy              Orders:          33736   Office/outpatient visit; established patient, level 4  (In-House)             68235   Drug test prsmv read direct optical obs pr date  (In-House)           719.49 Diffuse arthralgia            M15.9    Polyosteoarthritis, unspecified    300.02 Generalized anxiety disorder            F41.1    Generalized anxiety disorder    244.8 Acquired hypothyroidism            E03.8    Other specified hypothyroidism    307.42 Chronic insomnia            F51.01    Primary insomnia    272.0 Essential hypercholesterolemia            E78.00    Pure hypercholesterolemia, unspecified    268.9 Vitamin D deficiency, unspecified            E55.9    Vitamin D deficiency, unspecified    530.81 GERD            K21.9    Gastro-esophageal reflux disease without esophagitis    733.09 Osteoporosis, other            M81.8    Other osteoporosis without current pathological fracture    441.2 Thoracic aortic aneurysm, without rupture            I71.2    Thoracic aortic aneurysm, without rupture    708.9 Hives            L50.0    Allergic urticaria    783.41 Failure to gain weight            R62.7    Adult failure to thrive    V76.49 Screening for colorectal cancer            Z12.11    Encounter for screening for malignant neoplasm of colon        ADDENDUMS:      ____________________________________    Addendum: 09/26/2019 10:57 AM - One, Team         Visit Note Faxed to:        User Entered Recipient; Number [phone]

## 2021-05-18 NOTE — PROGRESS NOTES
Juanita Morelos. 1955     Office/Outpatient Visit    Visit Date: Thu, Aug 2, 2018 01:29 pm    Provider: Belkis Berkowitz MD (Assistant: Coco Lentz MA)    Location: [facility]        Electronically signed by Belkis Berkowitz MD on  08/10/2018 11:11:10 AM                             SUBJECTIVE:        CC:     Juanita Lee is a 63 year old White female.  Patient is here for Medicare Wellness PE.;         HPI:         Juanita Lee is here for a Medicare wellness visit.  Individual and family medical history was reviewed and updated A list of current providers and suppliers reviewed and updated A current height, weight, BMI, blood pressure, and pulse were recorded in the vitals section of the note and have been reviewed A review of possible cognitive impairment was performed and the following was noted: she is having difficulty driving;  not experiencing changes in memory A review of functional ability and level of safety was performed and was negative She denies having trouble hearing the TV/radio when others do not, having to strain to hear or understand conversations and wearing hearing aid(s).  Concerning home safety, she reports that at home she DOES have throw rugs, grab bars in the bath and functioning smoke alarms, but not poor lighting or a slippery bath or shower.      Immunization Status: ** Has not received pneumococcal vaccination; ** Has not received influenza vaccine for this season; ** Has not received hepatitis B immunization; Age>60, no shingles vaccination;     Physical Activity: Exercises at least 3 times per week; Has not had any falls or only one fall without injury in the past year.  Advance Care Directive updated today in [facility] Tobacco: She has a past history of cigarette smoking; quit date:  45 years ago.    Preventative Health updated today         PHQ-9 Depression Screening: Completed form scanned and in chart; Total Score 5 Alcohol Consumption Screening: Completed form scanned and in chart;  Total Score 0     Jessy is following up for her 3 month face to face, she is on lorazepam for her chronic anxiety.  She is worse today post op, stressed over her health, lorazepam helps her cope and stay calm.  She is not sleeping well due to pain.  She denies depression.  She is trying to wean down off her lorazepam.  She has racing thoughts prn, worse at night. She understands risk of addiction and SE on this medication. No suicidal thoughts. No signs or addiciton or diversion     L ear is bothering her, she gets frequent OM         Acquired hypothyroidism details; she is currently taking Levoxyl, 75 mcg daily.  TSH was last checked 6 months ago.  The result was reported as normal.  Currently, she is experiencing fatigue.  She reports no symptoms suggestive of adverse medication effect.          Additionally, she presents with history of essential hypercholesterolemia.  date of diagnosis Dec 2012.  Compliance with treatment has been good.  She denies experiencing any hypercholesterolemia related symptoms.  Most recent lab tests include TSH:  4.190 (mIU/L) (11/16/2017), Creatinine, Serum:  0.69 (mg/dl) (02/14/2018), Glom Filt Rate, Est:  >60 (ml/min/1.73m2) (02/14/2018), Total Cholesterol:  189 (mg/dL) (11/16/2017), HDL:  83 (mg/dL) (11/16/2017), Triglycerides:  56 (mg/dL) (11/16/2017), LDL:  95 (mg/dL) (11/16/2017), Hemoglobin:  12.70 (gm/dl) (02/14/2018), Hematocrit:  38.1 (%) (02/14/2018).      Vitamin D def, she is on 17824 units daily     ROS:     CONSTITUTIONAL:  Positive for fatigue.   Negative for fever.      EYES:  Negative for blurred vision.      CARDIOVASCULAR:  Negative for chest pain, palpitations, tachycardia, orthopnea, and edema.      RESPIRATORY:  Negative for recent cough, dyspnea and frequent wheezing.      GASTROINTESTINAL:  Negative for abdominal pain, heartburn, constipation, diarrhea, and stool changes.      MUSCULOSKELETAL:  Negative for back pain.      INTEGUMENTARY/BREAST:  Negative for  rash.      NEUROLOGICAL:  Negative for dizziness, headaches, paresthesias, and weakness.      PSYCHIATRIC:  Positive for insomnia.   Negative for crying spells, depression or suicidal thoughts.          PMH/FMH/SH:     Last Reviewed on 2018 01:50 PM by Belkis Berkowitz    Past Medical History:                 PAST MEDICAL HISTORY         Gastroesophageal Reflux Disease     Osteoarthritis    Osteoporosis     hypoglycemia     vit D def         GYNECOLOGICAL HISTORY:             CURRENT MEDICAL PROVIDERS:    Ophthalmologist: ALVA/BAR    Orthopedist: DONNA/NIVIA    VASCULAR-DR EUGENIA HANKINS         PREVENTIVE HEALTH MAINTENANCE             BONE DENSITY: was last done 3-23-16 with the following abnormality noted-- osteoporosis     COLORECTAL CANCER SCREENING: colonoscopy with the following abnormalities noted-- diverticulosis     MAMMOGRAM: was last done 3-23-16 with normal results scheduled in 2017/dmt         PAST MEDICAL HISTORY                 ADVANCED DIRECTIVES: None         Surgical History:         Appendectomy: ;     Cholecystectomy: ;     Hysterectomy: ;     OOPHRECTOMY      Cataract Removal: /8 surgeriescomplications?;      section: X 2;     Joint Replacement: knee; ;         Family History:         Positive for Hyperlipidemia ( mother ) and Myocardial Infarction ( father ).      Positive for Breast Cancer ( mat. aunt ), Colon Cancer ( mat. uncle ) and Lung Cancer ( mat. uncle; mat. aunt ).      Positive for pulmonary fibrosis=- mat uncle.      Positive for Alzheimer's Disease ( mat. uncle ) and Cerebrovascular Accident ( mat. uncle ).          Social History:     Occupation:    Disabled     Marital Status:      Children: 3 children         Tobacco/Alcohol/Supplements:     Last Reviewed on 2018 02:11 PM by Belkis Berkowitz    Tobacco: She has a past history of cigarette smoking; quit date:  .  Non-drinker         Substance Abuse  History:     Last Reviewed on 1/04/2018 11:23 AM by Radames Purcell        Mental Health History:     Last Reviewed on 1/04/2018 11:23 AM by Radames Purcell        Communicable Diseases (eg STDs):     Last Reviewed on 1/04/2018 11:23 AM by Radames Purcell            Immunizations:     None        Allergies:     Last Reviewed on 6/04/2018 11:05 AM by Coco Lentz    Actonel: Myalgia (Adverse Reaction)    Morphine Sulfate: nausea, tachycardia        Current Medications:     Last Reviewed on 6/04/2018 11:05 AM by Coco Lentz    Diazepam 5mg Tablet 1/2 to 1 PO BID PRN     Synthroid 0.075mg Tablet Take 1 tablet(s) by mouth daily     Sodium Chloride 0.65% Nasal Solution 1 squirt each nostril BID     Vitamin D3 5,000IU Capsules 2 tabs daily     Fluticasone Propionate 50mcg/1actuation Nasal Spray 1 spray each nostil daily     Naprosyn 500mg Tablet Take 1 tablet(s) by mouth bid  prn     Multivitamin/Mineral Supplement Tablet Take 1 tablet(s) by mouth daily     citracal + d     Ibuprofen 200mg Tablet prn     PROBIOTIC QD         OBJECTIVE:        Vitals:         Current: 8/2/2018 1:35:04 PM    Ht:  5 ft, 2.75 in;  Wt: 93.7 lbs;  BMI: 16.7    T: 98.9 F (oral);  BP: 142/78 mm Hg (left arm, sitting);  P: 102 bpm (left arm (BP Cuff), sitting);  sCr: 0.69 mg/dL;  GFR: 66.99    VA: 20/200 OD, 20/40 OS (near, with correction)        Exams:     PHYSICAL EXAM:     GENERAL:  well developed and nourished; appropriately groomed; in no apparent distress;     EYES: PERRL, EOMI;     E/N/T: EARS: external auditory canal occluded by cerumen on the right;  left TM is normal and the right TM is has fluid behind it;  OROPHARYNX:  normal mucosa, dentition, gingiva, and posterior pharynx;     NECK:  supple, full ROM; no thyromegaly; no carotid bruits;     RESPIRATORY: normal respiratory rate and pattern with no distress; normal breath sounds with no rales, rhonchi, wheezes or rubs;     CARDIOVASCULAR: regular rate and rhythm;  normal S1, S2; no murmur, rub, or gallop; normal PMI;     GASTROINTESTINAL: nontender, nondistended; no hepatosplenomegaly or masses; no bruits; rectal exam: DECLINES;     GENITOURINARY: DECLINES;     BREAST/INTEGUMENT: BREASTS: breast exam is normal without masses, skin changes, or nipple discharge;     MUSCULOSKELETAL:  Normal range of motion, strength and tone;     NEUROLOGICAL:  cranial nerves, motor and sensory function, reflexes, gait and coordination are all intact;     PSYCHIATRIC:  appropriate affect and demeanor; normal speech pattern; grossly normal memory;         Lab/Test Results:             Amphetamines Screen, Urin:  Negative (08/02/2018),     BAR-Barbiturates Screen, Urin:  Negative (08/02/2018),     Buprenorphine:  Negative (08/02/2018),     BZO-Benzodiazepines Screen,Ur:  Positive (08/02/2018),     Cocaine(Metab.)Screen, Ur:  Negative (08/02/2018),     MDMA-Ecstasy:  Negative (08/02/2018),     Met-Methamphetamine:  Negative (08/02/2018),     MTD-Methadone Screen, Urine:  Negative (08/02/2018),     Opiate Screen, Urine:  Negative (08/02/2018),     OXY-Oxycodone:  Negative (08/02/2018),     PCP-Phencyclidine Screen, Uri:  Negative (08/02/2018),     THC Cannabinoids Screen, Urin:  Negative (08/02/2018),     Urine temperature:  confirmed (08/02/2018),     Date and time of last pill:  Diazepam 8-1-18 at 10pm (08/02/2018),     Performed by:  pr (08/02/2018),     Collection Time:  14:42 (08/02/2018),             ASSESSMENT           V70.0   Z00.00  Health checkup              DDx:     V79.0   Z13.89  Screening for depression              DDx:     V76.19   Z12.39  Screening breast exam - other              DDx:     627.2   N95.1  Menopause              DDx:     V76.49   Z12.11  Screening for colorectal cancer              DDx:     V43.61   Z96.619  Artificial joint replacement, Shoulder              DDx:     300.02   F41.1  Generalized anxiety disorder              DDx:     381.3   Z79.899  Chronic otitis  media (OM) with effusion              DDx:     244.8   E03.8  Acquired hypothyroidism              DDx:     307.42   F51.01  Chronic insomnia              DDx:     272.0   E78.00  Essential hypercholesterolemia              DDx:     V58.69   Z79.899  Use of high risk medications              DDx:     268.9   E55.9  Vitamin D deficiency              DDx:     789.06   R10.13  Epigastric abdominal pain              DDx:         ORDERS:         Meds Prescribed:       Refill of: Diazepam 5mg Tablet 1/2 to 1 PO BID PRN  #30 (Thirty) tablet(s) Refills: 2       Omeprazole 40mg Capsules, Extended Release 1 capsule daily  #90 (Ninety) capsule(s) Refills: 0         Radiology/Test Orders:       3014F  Screening mammography results documented and reviewed (PV)1  (In-House)         20147  DXA, bone density study, 1 or more sites; axial skeleton (eg hips, pelvis, spine)  (Send-Out)         58737  Screening mammography bi 2-view inc CAD  (Send-Out)           Lab Orders:       64571  Drug test prsmv read direct optical obs pr date  (In-House)         18369  VITD - [facility] Vitamin D, 25 Hydroxy  (Send-Out)         16956  HPUBT - [facility] H.pylori Breath test  (Send-Out)         24755  PHYSF - [facility] PHYSICAL: CMP, CBC, TSH, LIPID: [id number], [id number], 87075, 05194  (Send-Out)         51130  FT4 - [facility] Thyroxine, free T4  (Send-Out)         77051  TOTTH - [facility] T4, Total  (Send-Out)           Procedures Ordered:         Annual wellness visit, includes a PPPS, subsequent visit  (In-House)           Other Orders:       1101F  Pt screen for fall risk; document no falls in past year or only 1 fall w/o injury in past year (CIPRIANO)  (In-House)           Negative EtOH screen  (In-House)           Calculated BMI below the lower parameter and a follow-up plan was documented in the medical record  (In-House)           Colorectal cancer screening; fecal-occult blood test, immunoassay, 1-3 simultaneous determinations  (Send-Out)                    PLAN:          Health checkup  MEDICARE WELLNESS EXAM-SHE IS DUE FOR MAMMOGRAM/DEXA, UTD ON COLONOSCOPY, DEFERS PAP/PELVIC,  SHE DEFERS FLU VACCINATION, RECOMMEND SHINGLES VACCINE, TD UTD, PNEUMONIA AND PREVNAR AT 65.  NO FALL RISK, NO MEMORY ISSUES, DEPRESSION IS STABLE, SHE LIVES WITH HER DAUGHTER, CAREGIVER FOR HER MOTHER, SHE IS ABLE TO DRIVE AND PERFORM ADL'S/DO OWN FINANCES, SHE WAS GIVEN A HA ON  A LIVING WILL AND A SAFETY AND PREV SERVICES HANDOUT WAS GIVEN TO HER.  HEARING IS ADEQUATE, RECOMMEND YEARLY EYE EXAM. MD LIST UPDATED           Orders:         Annual wellness visit, includes a PPPS, subsequent visit  (In-House)            Screening for depression     MIPS Current diagnosis of depression and/or bipolar disorder Negative alcohol screen     MAMMOGRAM: Done within last 2 years and results in are chart     BMI Low - Follow-Up Plan: She was provided education on how to safely increase weight           Orders:       1101F  Pt screen for fall risk; document no falls in past year or only 1 fall w/o injury in past year (CIPRIANO)  (In-House)           Negative EtOH screen  (In-House)         3014F  Screening mammography results documented and reviewed (PV)1  (In-House)           Calculated BMI below the lower parameter and a follow-up plan was documented in the medical record  (In-House)            Screening breast exam - other           Orders:       12598  Screening mammography bi 2-view inc CAD  (Send-Out)            Menopause           Orders:       74098  DXA, bone density study, 1 or more sites; axial skeleton (eg hips, pelvis, spine)  (Send-Out)            Screening for colorectal cancer           Orders:         Colorectal cancer screening; fecal-occult blood test, immunoassay, 1-3 simultaneous determinations  (Send-Out)            Artificial joint replacement, Shoulder  post R shoulder replacment by Dr Michaud, sched for PT next week          Generalized anxiety disorder worse post op,  diazepam refilled, she is stressed over her R shoulder surgery          Chronic otitis media (OM) with effusion she has chronic recurrent OM          Acquired hypothyroidism due for labs, she has no appetite and is losing wt          Chronic insomnia poor, she is sleeping in a recliner post R shoulder replacment          Essential hypercholesterolemia will recheck labs          Use of high risk medications         RECOMMENDATIONS given include: amos reviewed, drug screen performed and appropriate, consent is reviewed and signed and on the chart, she is aware of risk of addiction on this medication and understands that she will need to follow up for a review every 3 months and her medications will be adjusted or decreased as deemed appropriate at each visit.  No personal history of drug or alcohol abuse.  No concerns about diversion or abuse.  She denies side effects related to the medication.  She is  aware that she may be called in for pill counts..            Orders:       47558  Drug test prsmv read direct optical obs pr date  (In-House)            Vitamin D deficiency ON 13400 UNITS A DAY     LABORATORY:  Labs ordered to be performed today include Vitamin D.            Orders:       22512  VITD - H Vitamin D, 25 Hydroxy  (Send-Out)            Epigastric abdominal pain STOP NSAID     LABORATORY:  Labs ordered to be performed today include H.pylori urea breath test ([facility]), PHYSICAL PANEL; CMP, CBC, TSH, LIPID, Free T4, total T4, and Vitamin D.            Prescriptions:       Refill of: Diazepam 5mg Tablet 1/2 to 1 PO BID PRN  #30 (Thirty) tablet(s) Refills: 2       Omeprazole 40mg Capsules, Extended Release 1 capsule daily  #90 (Ninety) capsule(s) Refills: 0           Orders:       80153  HPUBT - [facility] H.pylori Breath test  (Send-Out)         14355  PHYSF - [facility] PHYSICAL: CMP, CBC, TSH, LIPID: [id number], 98550, 00791, 30857  (Send-Out)         15401  FT4 - [facility] Thyroxine, free T4  (Send-Out)         59085  TOTTH - [facility]  T4, Total  (Send-Out)               CHARGE CAPTURE           **Please note: ICD descriptions below are intended for billing purposes only and may not represent clinical diagnoses**        Primary Diagnosis:         V70.0 Health checkup            Z00.00    Encounter for general adult medical examination without abnormal findings              Orders:          87737   Preventive medicine, established patient, age 40-64 years  (In-House)                Annual wellness visit, includes a PPPS, subsequent visit  (In-House)           V79.0 Screening for depression            Z13.89    Encounter for screening for other disorder              Orders:          52553 -25  Office/outpatient visit; established patient, level 4  (In-House)             1101F   Pt screen for fall risk; document no falls in past year or only 1 fall w/o injury in past year (CIPRIANO)  (In-House)                Negative EtOH screen  (In-House)             3014F   Screening mammography results documented and reviewed (PV)1  (In-House)                Calculated BMI below the lower parameter and a follow-up plan was documented in the medical record  (In-House)           V76.19 Screening breast exam - other            Z12.39    Encounter for other screening for malignant neoplasm of breast    627.2 Menopause            N95.1    Menopausal and female climacteric states    V76.49 Screening for colorectal cancer            Z12.11    Encounter for screening for malignant neoplasm of colon    V43.61 Artificial joint replacement, Shoulder            Z96.619    Presence of unspecified artificial shoulder joint    300.02 Generalized anxiety disorder            F41.1    Generalized anxiety disorder    381.3 Chronic otitis media (OM) with effusion            Z79.899    Other long term (current) drug therapy    244.8 Acquired hypothyroidism            E03.8    Other specified hypothyroidism    307.42 Chronic insomnia            F51.01    Primary insomnia     272.0 Essential hypercholesterolemia            E78.00    Pure hypercholesterolemia, unspecified    V58.69 Use of high risk medications            Z79.899    Other long term (current) drug therapy              Orders:          78084   Drug test prsmv read direct optical obs pr date  (In-House)           268.9 Vitamin D deficiency            E55.9    Vitamin D deficiency, unspecified    789.06 Epigastric abdominal pain            R10.13    Epigastric pain

## 2021-05-18 NOTE — PROGRESS NOTES
Juanita Morelos 1955     Office/Outpatient Visit    Visit Date: Fri, Mar 23, 2018 03:35 pm    Provider: Kandy Bentley N.P. (Assistant: Magalys Epps MA)    Location: [facility]        Electronically signed by Kandy Bentley N.P. on  2018 09:08:26 PM                             SUBJECTIVE:        CC:     Juanita Lee is a 62 year old White female.  chest congestion, cough;         HPI:         Upper respiratory illness noted.  These have been present for the past 3 weeks.  The symptoms include chest congestion and cough.  She has already tried to relieve the symptoms with Robitussin, Ibuprofen.      ROS:     CONSTITUTIONAL:  Negative for chills and fever.      EYES:  Negative for eye drainage and eye pain.      E/N/T:  Negative for ear pain and sore throat.      CARDIOVASCULAR:  Negative for chest pain and palpitations.      RESPIRATORY:  Positive for recent cough.   Negative for dyspnea or frequent wheezing.          PMH/FMH/SH:     Last Reviewed on 2018 12:18 PM by Belkis Berkowitz    Past Medical History:                 PAST MEDICAL HISTORY         Gastroesophageal Reflux Disease     Osteoarthritis    Osteoporosis    chronic knee and back pain, acute shoulder pain     hypoglycemia     vit D def         GYNECOLOGICAL HISTORY:             CURRENT MEDICAL PROVIDERS:    Ophthalmologist: ALVA/BAR    Orthopedist: DONNA BRISCOE-DR EUGENIA HANKINS         PREVENTIVE HEALTH MAINTENANCE         Patient confirms that she EGD 2010.      BONE DENSITY: was last done 3-23-16 with the following abnormality noted-- osteoporosis     COLONOSCOPY: Done within the last 10 years was last done 2010 with the following abnormalities noted-- diverticulosis     MAMMOGRAM: was last done 3-23-16 with normal results scheduled in 2017/dmt         Surgical History:         Appendectomy: ;     Cholecystectomy: ;     Hysterectomy: ;     OOPHRECTOMY      Cataract Removal:   surgeriescomplications?;      section: X 2;     Joint Replacement: knee; ;         Family History:         Positive for Hyperlipidemia ( mother ) and Myocardial Infarction ( father ).      Positive for Breast Cancer ( mat. aunt ), Colon Cancer ( mat. uncle ) and Lung Cancer ( mat. uncle; mat. aunt ).      Positive for pulmonary fibrosis=- mat uncle.      Positive for Alzheimer's Disease ( mat. uncle ) and Cerebrovascular Accident ( mat. uncle ).          Social History:     Occupation:    Disabled     Marital Status:      Children: 3 children         Tobacco/Alcohol/Supplements:     Last Reviewed on 2018 12:18 PM by Belkis Berkowitz    Tobacco: She has a past history of cigarette smoking; quit date:  .  Non-drinker         Substance Abuse History:     Last Reviewed on 2018 11:23 AM by Radames Purcell        Mental Health History:     Last Reviewed on 2018 11:23 AM by Radames Purcell        Communicable Diseases (eg STDs):     Last Reviewed on 2018 11:23 AM by Radames Purcell            Current Problems:     Last Reviewed on 2018 11:23 AM by Radames Purcell    Low back pain     Chronic insomnia     Use of high risk medications     Acquired hypothyroidism, other specified cause     Serous otitis media     Osteoporosis, unspecified     Actinic keratosis     Varicose veins of lower extremities, with Pain     Tension Headache     Unexplained weight loss     Osteoporosis     Acquired hypothyroidism     Essential hypercholesterolemia     Chronic otitis media (OM) with effusion     Generalized anxiety disorder     Vitamin D deficiency     GERD     Herniated disc     Artificial joint replacement, Knee     Cataract     Post-menopausal HRT     Dry skin syndrome     Ear pain     Atypical skin lesion     Abdominal pain, unspecified     Screening for colorectal cancer     Headache         Immunizations:     None        Allergies:     Last Reviewed on 3/23/2018  03:40 PM by Magalys Epps    Actonel: Myalgia (Adverse Reaction)    Morphine Sulfate: nausea, tachycardia        Current Medications:     Last Reviewed on 3/23/2018 03:41 PM by Magalys Epps    Diazepam 5mg Tablet 1/2 to 1 PO BID PRN     Sodium Chloride 0.65% Nasal Solution 1 squirt each nostril BID     Vitamin D3 5,000IU Capsules 2 tabs daily     Fluticasone Propionate 50mcg/1actuation Nasal Spray 1 spray each nostil daily     Naprosyn 500mg Tablet Take 1 tablet(s) by mouth bid  prn     Synthroid 0.075mg Tablet Take 1 tablet(s) by mouth daily     Multivitamin/Mineral Supplement Tablet Take 1 tablet(s) by mouth daily     citracal + d     Ibuprofen 200mg Tablet prn     PROBIOTIC QD         OBJECTIVE:        Vitals:         Current: 3/23/2018 3:39:30 PM    Ht:  5 ft, 2.75 in;  Wt: 99.6 lbs;  BMI: 17.8    T: 97.6 F (oral);  BP: 124/72 mm Hg (left arm, sitting);  P: 103 bpm (left arm (BP Cuff), sitting);  sCr: 0.69 mg/dL;  GFR: 69.62    O2 Sat: 97 % (room air)        Exams:     PHYSICAL EXAM:     GENERAL: well developed, well nourished;  no apparent distress;     EYES: PERRL, EOMI     E/N/T: EARS: external auditory canal normal;  both TMs are dull;  NOSE: normal turbinates; no sinus tenderness; OROPHARYNX: oral mucosa is normal; posterior pharynx shows no exudate;     NECK: range of motion is normal; trachea is midline;     RESPIRATORY: normal respiratory rate and pattern with no distress; normal breath sounds with no rales, rhonchi, wheezes or rubs;     CARDIOVASCULAR: regular rate and rhythm; normal S1, S2; no murmur, rub, or gallop; normal PMI;     MUSCULOSKELETAL: normal gait;     NEUROLOGIC: mental status: alert and oriented x 3; GROSSLY INTACT     PSYCHIATRIC: appropriate affect and demeanor;         Procedures:     Acute bronchitis     1. Kenalog 60 mg given IM in the left hip; administered by calderon;  lot number [id number]; expires AUG2019             ASSESSMENT           466.0   J20.9  Acute bronchitis               DDx:         ORDERS:         Meds Prescribed:       Mucinex (Guaifenesin) 600mg Tablets, Extended Release 1 bid prn  #60 (Sixty) tablet(s) Refills: 0       Tessalon Perles (Benzonatate) 100mg Capsules One PO Q 8 hours PRN cough  #30 (Thirty) capsule(s) Refills: 0         Other Orders:       [id number]  Therapeutic injection  (In-House)           Kenalog, per 10 mg (x6)                 PLAN:          Acute bronchitis         RECOMMENDATIONS given include: Push Fluids, Rest, Follow up if no improvement or worsening symptoms like high fevers, vomiting, weakness, or increasing shortness of air.    .  Steroids Kenalog 60 mg 1.5 ml           Prescriptions: Advised that cough medication may cause drowsiness.       Mucinex (Guaifenesin) 600mg Tablets, Extended Release 1 bid prn  #60 (Sixty) tablet(s) Refills: 0       Tessalon Perles (Benzonatate) 100mg Capsules One PO Q 8 hours PRN cough  #30 (Thirty) capsule(s) Refills: 0           Orders:       [id number]  Therapeutic injection  (In-House)                     Kenalog, per 10 mg (x6)             CHARGE CAPTURE           **Please note: ICD descriptions below are intended for billing purposes only and may not represent clinical diagnoses**        Primary Diagnosis:         466.0 Acute bronchitis            J20.9    Acute bronchitis, unspecified              Orders:          [id number]   Office/outpatient visit; established patient, level 3  (In-House)             50143   Therapeutic injection  (In-House)                                           Kenalog, per 10 mg (x6)

## 2021-05-18 NOTE — PROGRESS NOTES
Juanita Morelos 1955     Office/Outpatient Visit    Visit Date:  03:26 pm    Provider: Kandy Bentley N.P. (Assistant: Dalia Art MA)    Location: [facility]        Electronically signed by Kandy Bentley N.P. on  2019 06:46:01 PM                             SUBJECTIVE:        CC:     Juanita Lee is a 63 year old White female.  presents today due to complaints of SOB, cough X 3 days         HPI:         Juanita Lee presents with upper respiratory illness.  These have been present for the past 3 days.  The symptoms include chest congestion, productive cough and shortness of breath.  She denies fever.  She has already tried to relieve the symptoms with ibuprofen and Increased fluid intake.      ROS:     CONSTITUTIONAL:  Negative for chills and fever.      EYES:  Negative for eye drainage and eye pain.      E/N/T:  Positive for runny nose.   Negative for ear pain or sore throat.      CARDIOVASCULAR:  Negative for chest pain and palpitations.      RESPIRATORY:  Positive for recent cough and dyspnea.          PMH/FM/:     Last Reviewed on 2019 04:55 PM by Belkis Berkowitz    Past Medical History:                 PAST MEDICAL HISTORY         Gastroesophageal Reflux Disease     Osteoarthritis    Osteoporosis     hypoglycemia     vit D def         GYNECOLOGICAL HISTORY:             CURRENT MEDICAL PROVIDERS:    Ophthalmologist: ALVA/AAYUSH    Orthopedist: DONNA/NIVIA    [facility]-DR EUGENIA HANKINS         PREVENTIVE HEALTH MAINTENANCE             BONE DENSITY: was last done 3-23-16 with the following abnormality noted-- osteoporosis     COLORECTAL CANCER SCREENING: colonoscopy with the following abnormalities noted-- diverticulosis     MAMMOGRAM: was last done 3-23-16 with normal results scheduled in 2017/dmt         PAST MEDICAL HISTORY                 ADVANCED DIRECTIVES: None         Surgical History:         Appendectomy: ;     Cholecystectomy: ;      Hysterectomy: ;     OOPHRECTOMY      Cataract Removal: details/8 surgeriescomplications?;      section: X 2;     Joint Replacement: knee; ;         Family History:         Positive for Hyperlipidemia ( mother ) and Myocardial Infarction ( father ).      Positive for Breast Cancer ( mat. aunt ), Colon Cancer ( mat. uncle ) and Lung Cancer ( mat. uncle; mat. aunt ).      Positive for pulmonary fibrosis=- mat uncle.      Positive for Alzheimer's Disease ( mat. uncle ) and Cerebrovascular Accident ( mat. uncle ).          Social History:     Occupation:    Disabled     Marital Status:      Children: 3 children         Tobacco/Alcohol/Supplements:     Last Reviewed on 2019 04:55 PM by Belkis Berkowitz    Tobacco: She has a past history of cigarette smoking; quit date:  .  Non-drinker         Substance Abuse History:     Last Reviewed on 2018 11:23 AM by Radames Purcell        Mental Health History:     Last Reviewed on 2018 11:23 AM by Radames Purcell        Communicable Diseases (eg STDs):     Last Reviewed on 2018 11:23 AM by Radames Purcell            Current Problems:     Last Reviewed on 2018 03:53 PM by Debbie Nichole    Vitamin D deficiency, unspecified     Use of high risk medications     Serous otitis media     Artificial joint replacement, Shoulder     Low back pain     Chronic insomnia     Acquired hypothyroidism, other specified cause     Osteoporosis, unspecified     Actinic keratosis     Varicose veins of lower extremities, with Pain     Tension Headache     Unexplained weight loss     Osteoporosis     Acquired hypothyroidism     Essential hypercholesterolemia     Chronic otitis media (OM) with effusion     Generalized anxiety disorder     Vitamin D deficiency     GERD     Herniated disc     Artificial joint replacement, Knee     Cataract     Post-menopausal HRT     Dry skin syndrome     Uncomplicated right inguinal hernia     Screening  for colorectal cancer     Headache         Immunizations:     None        Allergies:     Last Reviewed on 1/09/2019 04:02 PM by Spurling, Sarah C    Actonel: Myalgia (Adverse Reaction)    Morphine Sulfate: nausea, tachycardia        Current Medications:     Last Reviewed on 1/09/2019 04:06 PM by Spurling, Sarah C    Fluticasone Propionate 50mcg/1actuation Nasal Spray 1 spray each nostil daily     Omeprazole 40mg Capsules, Extended Release 1 capsule daily     Diazepam 5mg Tablet 1/2 to 1 PO BID PRN     Synthroid 0.075mg Tablet Take 1 tablet(s) by mouth daily     Sodium Chloride 0.65% Nasal Solution 1 squirt each nostril BID     Vitamin D3 5,000IU Capsules 2 tabs daily     Multivitamin/Mineral Supplement Tablet Take 1 tablet(s) by mouth daily     Mirtazapine 15mg Tablet 1 po at hs     Ibuprofen 200mg Tablet prn     PROBIOTIC QD         OBJECTIVE:        Vitals:         Current: 1/24/2019 3:33:50 PM    Ht:  5 ft, 2.75 in;  Wt: 100.2 lbs;  BMI: 17.9    T: 97.5 F (oral);  BP: 113/84 mm Hg (right arm, sitting);  P: 91 bpm (right arm (BP Cuff), sitting);  sCr: 0.6 mg/dL;  GFR: 79.27    O2 Sat: 98 % (room air)        Exams:     PHYSICAL EXAM:     GENERAL: well developed, well nourished;  no apparent distress;     EYES: PERRL, EOMI     E/N/T: EARS: external auditory canal normal;  both TMs are dull;  NOSE: normal turbinates; no sinus tenderness; OROPHARYNX: oral mucosa is normal; posterior pharynx shows no exudate and post nasal drip;     NECK: range of motion is normal; trachea is midline;     RESPIRATORY: normal appearance and symmetric expansion of chest wall; normal respiratory rate and pattern with no distress; diffuse expiratory wheezes;     CARDIOVASCULAR: normal rate; rhythm is regular;     NEUROLOGIC: mental status: alert and oriented x 3;     PSYCHIATRIC: appropriate affect and demeanor;         Procedures:     Acute bronchitis         Nebulizer: Albuterol 2.5/3ml 1 treatments were performed on patient.  Pre-treatment Pulse:; O2 Sat:97 Lot# [id number]  Expiration:02/2020 Was treatment tolerated? yes; Administered by KG 1. Kenalog 40 mg given IM in the left hip; administered by /KG;  lot number [id number]; expires 08/2020             ASSESSMENT           466.0   J20.9  Acute bronchitis              DDx:         ORDERS:         Meds Prescribed:       Promethazine with Dextromethrophan 6.25mg/15mg per 5ml Syrup 1 tsp p.o. q 4-6 hrs. prn cough/nausea  #4 (Four) oz Refills: 0         Procedures Ordered:       [id number]  inhalation treatment for acute airway obstruction or for sputum induction for diagnostic purposes; e  (In-House)           Other Orders:       [id number]  Therapeutic injection  (In-House)         96939  Noninvasive ear or pulse oximetry for oxygen saturation; multiple determinations  (In-House)           Kenalog, per 10 mg (x4)         Albuterol, inhale soln, FDA-apprvd final, non-compounded, admin thru DME, unit dose 1 mg (x2.5)                 PLAN:          Acute bronchitis May use Albuterol neb treatments that she has at home.         TESTS/PROCEDURES:  Will proceed with Inhalation Treatment Albuterol 0.083% unit dose (units 2.5) to be performed/scheduled now.      RECOMMENDATIONS given include: Push Fluids, Rest, Follow up if no improvement or worsening symptoms like high fevers, vomiting, weakness, or increasing shortness of air.    .  Steroids Kenalog 40 mg 1 ml     FOLLOW-UP: Schedule follow-up appointments on a p.r.n. basis. Chronic visit follow up           Prescriptions: Advised that cough medication may cause drowsiness.       Promethazine with Dextromethrophan 6.25mg/15mg per 5ml Syrup 1 tsp p.o. q 4-6 hrs. prn cough/nausea  #4 (Four) oz Refills: 0           Orders:       [id number]  inhalation treatment for acute airway obstruction or for sputum induction for diagnostic purposes; e  (In-House)                     Albuterol, inhale soln, FDA-apprvd final, non-compounded, admin thru DME, unit dose  1 mg (x2.5)       [id number]  Therapeutic injection  (In-House)                     Kenalog, per 10 mg (x4)       75305  Noninvasive ear or pulse oximetry for oxygen saturation; multiple determinations  (In-House)               Patient Recommendations:        For  Acute bronchitis:     Schedule follow-up appointments as needed.              CHARGE CAPTURE           **Please note: ICD descriptions below are intended for billing purposes only and may not represent clinical diagnoses**        Primary Diagnosis:         466.0 Acute bronchitis            J20.9    Acute bronchitis, unspecified              Orders:          95151   Office/outpatient visit; established patient, level 3  (In-House)             73185   inhalation treatment for acute airway obstruction or for sputum induction for diagnostic purposes; e  (In-House)                                           Albuterol, inhale soln, FDA-apprvd final, non-compounded, admin thru DME, unit dose 1 mg (x2.5)           10019   Therapeutic injection  (In-House)                                           Kenalog, per 10 mg (x4)           34552   Noninvasive ear or pulse oximetry for oxygen saturation; multiple determinations  (In-House)

## 2021-05-18 NOTE — PROGRESS NOTES
"Juanita oMrelos  1955     Office/Outpatient Visit    Visit Date: Tue, Mar 24, 2020 09:47 am    Provider: Belkis Berkowitz MD (Assistant: Santa Winston MA)    Location: [facility]        Electronically signed by Belkis Berkowitz MD on  03/30/2020 11:53:53 AM                             Subjective:        CC: TELEHEALTH- CONSENT BY MNPs/p fall with colles fracture R wrist    HPI:         Juanita Lee has acute onset of R foot drop  due to questionable MS, and she is seeing neurology and trying to get an official diagnosis on this, and unfortunately she fell last week on Sat night 3/14 and fractured her R wrist and is s/p lai placement outpatient on Monday 3/17 with Dr Michaud.  In addition she saw her neurologist at  neurology, Dr Toro, and she states they took 8 tubes of blood.  She is on hydrocodone for pain and she stopped this due to GI upset.  She is now on ibuprofen.  She is wearing a splint for R wrist now and it is \"rubbing\" her.         Her anxiety is improved with the addition of valium-she takes 1/2 tablet nightly.  She is sleeping well with that and trazodone.        This past Saturday she felt dizzy and sick so she decided to cut her trazodone to 1/2 pill every other day and is still doing ok with sleep, and her dizzy spells resolved        She now has a splint for her R foot drop.                  Juanita Lee has acute onset of R foot drop  due to questionable MS, and she is seeing neurology and trying to get an official diagnosis on this, and unfortunately she fell last week on Sat night 3/14 and fractured her R wrist and is s/p lai placement outpatient on Monday 3/17 with Dr Michaud.  In addition she saw her neurologist at  neurology, Dr Toro, and she states they took 8 tubes of blood.  She is on hydrocodone for pain and she stopped this due to GI upset.  She is now on ibuprofen.  She is wearing a splint for R wrist now and it is \"rubbing\" her.         Her anxiety is improved " with the addition of valium-she takes 1/2 tablet nightly.  She is sleeping well with that and trazodone.        This past Saturday she felt dizzy and sick so she decided to cut her trazodone to 1/2 pill every other day and is still doing ok with sleep, and her dizzy spells resolved        She now has a splint for her R foot drop.              ROS:     CONSTITUTIONAL:  Positive for fatigue and she had wt loss-but she is maintaining at this time.   Negative for chills or fever.      EYES:  Negative for blurred vision.      E/N/T:  Positive for ear pain.   Negative for nasal congestion or frequent rhinorrhea.      CARDIOVASCULAR:  Negative for chest pain, orthopnea, paroxysmal nocturnal dyspnea and pedal edema.      RESPIRATORY:  Negative for dyspnea and frequent wheezing.      GASTROINTESTINAL:  Positive for acid reflux symptoms, diarrhea and nausea.   Negative for abdominal pain, constipation or vomiting.      MUSCULOSKELETAL:  Positive for ankle pain.      NEUROLOGICAL:  Negative for dizziness, headaches, paresthesias, and weakness.      PSYCHIATRIC:  Negative for anxiety, depression, and sleep disturbances.          Past Medical History / Family History / Social History:         Last Reviewed on 2020 11:16 AM by Belkis Berkowitz    Past Medical History:                 PAST MEDICAL HISTORY         Gastroesophageal Reflux Disease     Osteoarthritis    Osteoporosis     hypoglycemia     vit D def         GYNECOLOGICAL HISTORY:             CURRENT MEDICAL PROVIDERS:    Ophthalmologist: ALVA/AAYUSH    Orthopedist: DONNA/NIVIA    [facility]ULAR-DR EUGENIA HANKINS         PREVENTIVE HEALTH MAINTENANCE             BONE DENSITY: was last done 3-23-16 with the following abnormality noted-- osteoporosis     COLORECTAL CANCER SCREENING: Up to date (colonoscopy q10y; sigmoidoscopy q5y; Cologuard q3y) was last done 9/3/2010, Results are in chart; colonoscopy with the following abnormalities noted-- diverticulosis      MAMMOGRAM: Done within last 2 years and results in are chart was last done 18 with normal results     Prolia Injection : 1st injection at [facility] 19         PAST MEDICAL HISTORY                 ADVANCED DIRECTIVES: None         Surgical History:         Appendectomy: ;     Cholecystectomy: ;     Hysterectomy: ;     OOPHRECTOMY     Cataract Removal: details/8 surgeriescomplications?;      section: X 2;     Joint Replacement: knee; ;         Family History:         Positive for Hyperlipidemia ( mother ) and Myocardial Infarction ( father ).      Positive for Breast Cancer ( mat. aunt ), Colon Cancer ( mat. uncle ) and Lung Cancer ( mat. uncle; mat. aunt ).      Positive for pulmonary fibrosis=- mat uncle.      Positive for Alzheimer's Disease ( mat. uncle ) and Cerebrovascular Accident ( mat. uncle ).          Social History:     Occupation:    Disabled     Marital Status:      Children: 3 children         Tobacco/Alcohol/Supplements:     Last Reviewed on 3/20/2020 03:43 PM by Spurling, Sarah C    Tobacco: She has a past history of cigarette smoking; quit date:  .  Non-drinker         Substance Abuse History:     Last Reviewed on 2020 03:14 PM by Wale Puente        Mental Health History:     Last Reviewed on 2020 03:14 PM by Wale Puente        Communicable Diseases (eg STDs):     Last Reviewed on 2020 03:14 PM by Wale Puente        Allergies:     Last Reviewed on 3/20/2020 03:43 PM by Spurling, Sarah C    Actonel: Myalgia  (Adverse Reaction)    Macrobid:      Morphine Sulfate: Nausea,tachycardia         Current Medications:     Last Reviewed on 3/20/2020 03:46 PM by Spurling, Sarah C    latanoprost 0.005 % ophthalmic (eye) Drops [instill 1 drop into affected eye(s) by ophthalmic route once daily inthe evening]    hormone essentials     menopause essentials     Fish Oil 360-1,200 mg oral capsule    melatonin 3 mg oral tablet    Alaway 0.025 %  (0.035 %) ophthalmic (eye) Drops [instill 1 drop into affected eye(s) by ophthalmic route 2 times per day]    Multivitamin/Mineral Supplement  Tablet [Take 1 tablet(s) by mouth daily]    cholecalciferol (vitamin D3) 5,000 unit oral capsule [2 tabs daily]    Omeprazole 40 mg oral capsule,delayed release (enteric coated) [1 capsule daily]    Synthroid 0.075mg Tablet [Take 1 tablet(s) by mouth daily]    Ibuprofen 200 mg oral tablet [prn]    Diazepam 5 mg oral tablet [1/2 to 1 PO BID PRN]    ProAir HFA 90 mcg/actuation Inhalation HFA Aerosol Inhaler    Timolol Maleate 0.5 % ophthalmic (eye) Drops [1 drop both eye daily]    Prolia 60 mg/mL subcutaneous Syringe [inject 1 milliliter (60 mg) by subcutaneous route every 6 months ]    traZODone 50 mg oral tablet [take 1 tablet (50 mg) by oral route QHS]    sertraline 25 mg oral tablet [take 1 tablet (25 mg) by oral route once daily]    FUROSEMIDE   TAB 20MG    POT CHLORIDE CAP 10MEQ ER    MELOXICAM    TAB 7.5MG    DORZOL/TIMOL SOL 22.3-6.8    LATANOPROST  SOL 0.005%    ONDANSETRON  TAB 8MG        Objective:        Vitals:         Current: 3/24/2020 1:44:08 PM    Ht:  5 ft, 2.75 in;  Wt: 98 lbs;  BMI: 17.5T: 97.6 F (oral);  BP: 137/67 mm Hg (right arm, sitting);  P: 86 bpm (right arm (BP Cuff), sitting);  sCr: 0.64 mg/dL;  GFR: 72.69        Lab/Test Results:         Alkaline Phosphatase: 39 (U/L) (02/06/2020),     ALT (SGPT): 18 (U/L) (02/06/2020),     AST (SGOT): 26 (U/L) (02/06/2020),     Creatinine, Serum: 0.64 (mg/dl) (02/06/2020),     Glom Filt Rate, Est: >60 (ml/min/1.73m2) (02/06/2020),     Vitamin D, 25-Hydroxy: 75.9 (ng/mL) (09/11/2019),     TSH: 3.350 (mIU/L) (09/11/2019),     Total Cholesterol: 175 (mg/dL) (09/11/2019),     HDL: 76 (mg/dL) (09/11/2019),     Triglycerides: 75 (mg/dL) (09/11/2019),     LDL: 84 (mg/dL) (09/11/2019),     WBC count: 6.91 (K/ul) (01/28/2020),     Hemoglobin: 12.00 (gm/dl) (01/28/2020),     Hematocrit: 36.2 (%) (01/28/2020),              Assessment:         M62.81   Muscle weakness (generalized)       S52.531D   Colles' fracture of right radius, subsequent encounter for closed fracture with routine healing       F41.1   Generalized anxiety disorder       M81.0   Age-related osteoporosis without current pathological fracture       R62.7   Adult failure to thrive       I50.20   Unspecified systolic (congestive) heart failure           Plan:         Muscle weakness (generalized)she saw Dr Blanca and is pending labs and full work up    Telehealth: Verbal consent obtained for visit to occur via phone call; Staff, other than provider, present during telephone visit include Cassie Sharma LPN; Total time spent was 12 minutes; [id number]--Fcqhkoiyu E/M 11-20 minutes         Colles' fracture of right radius, subsequent encounter for closed fracture with routine healings/p surgical repair with Dr Michaud, pain is well controlled.         Generalized anxiety disorderstable on meds        Age-related osteoporosis without current pathological fracturestable        Adult failure to thriveshe is doing better at maintaining her wt-it is up 2 #'s!        Unspecified systolic (congestive) heart failureno  acute symptoms, stable on lasix            Charge Capture:         Primary Diagnosis:     M62.81  Muscle weakness (generalized)           Orders:      [id number]  Phys/QHP telephone evaluation 11-20 minutes  (In-House)              S52.531D  Colles' fracture of right radius, subsequent encounter for closed fracture with routine healing     F41.1  Generalized anxiety disorder     M81.0  Age-related osteoporosis without current pathological fracture     R62.7  Adult failure to thrive     I50.20  Unspecified systolic (congestive) heart failure

## 2021-05-18 NOTE — PROGRESS NOTES
Juanita MorelosMaxine 1955     Office/Outpatient Visit    Visit Date:  01:46 pm    Provider: Kandy Bentley N.P. (Assistant: Marian Lanedrs MA)    Location: [facility]        Electronically signed by Kandy Bentley N.P. on  2018 02:34:47 PM                             SUBJECTIVE:        CC:     Juanita Lee is a 62 year old White female.  Congestion/Nose Bleeds;         HPI:         Upper respiratory illness noted.  These have been present for the past 2 weeks.  The symptoms include cough, ear complaints, nasal congestion, sinus pain/pressure, Nosebleed and chest congestion.  She has already tried to relieve the symptoms with saline nasal spray.      ROS:     CONSTITUTIONAL:  Negative for chills and fever.      EYES:  Negative for eye drainage and eye pain.      E/N/T:  Positive for ear pain and sinus pressure.      CARDIOVASCULAR:  Negative for chest pain and palpitations.      RESPIRATORY:  Positive for recent cough.   Negative for dyspnea or frequent wheezing.      GASTROINTESTINAL:  Negative for abdominal pain and vomiting.          [facility]/:     Last Reviewed on 2018 11:23 AM by Radames Purcell    Past Medical History:                 PAST MEDICAL HISTORY         Gastroesophageal Reflux Disease     Osteoarthritis    Osteoporosis    chronic knee and back pain, acute shoulder pain     hypoglycemia     vit D def         GYNECOLOGICAL HISTORY:             CURRENT MEDICAL PROVIDERS:    Ophthalmologist: ALVA/BAR    Orthopedist: DONNA BRISCOE-DR EUGENIA HANKINS         PREVENTIVE HEALTH MAINTENANCE         Patient confirms that she EGD 2010.      BONE DENSITY: was last done 3-23-16 with the following abnormality noted-- osteoporosis     COLONOSCOPY: Done within the last 10 years was last done 2010 with the following abnormalities noted-- diverticulosis     MAMMOGRAM: was last done 3-23-16 with normal results scheduled in 2017/dmt         Surgical History:          Appendectomy: ;     Cholecystectomy: ;     Hysterectomy: ;     OOPHRECTOMY      Cataract Removal: details/8 surgeriescomplications?;      section: X 2;     Joint Replacement: knee; ;         Family History:         Positive for Hyperlipidemia ( mother ) and Myocardial Infarction ( father ).      Positive for Breast Cancer ( mat. aunt ), Colon Cancer ( mat. uncle ) and Lung Cancer ( mat. uncle; mat. aunt ).      Positive for pulmonary fibrosis=- mat uncle.      Positive for Alzheimer's Disease ( mat. uncle ) and Cerebrovascular Accident ( mat. uncle ).          Social History:     Occupation:    Disabled     Marital Status:      Children: 3 children         Tobacco/Alcohol/Supplements:     Last Reviewed on 2018 11:23 AM by Radames Purcell    Tobacco: She has a past history of cigarette smoking; quit date:  .  Non-drinker         Substance Abuse History:     Last Reviewed on 2018 11:23 AM by Radames Purcell        Mental Health History:     Last Reviewed on 2018 11:23 AM by Radames Purcell        Communicable Diseases (eg STDs):     Last Reviewed on 2018 11:23 AM by Radames Purcell            Current Problems:     Last Reviewed on 2018 11:23 AM by Radames Purcell    Chronic insomnia     Use of high risk medications     Acquired hypothyroidism, other specified cause     Serous otitis media     Osteoporosis, unspecified     Actinic keratosis     Varicose veins of lower extremities, with Pain     Tension Headache     Unexplained weight loss     Osteoporosis     Acquired hypothyroidism     Essential hypercholesterolemia     Chronic otitis media (OM) with effusion     Generalized anxiety disorder     Vitamin D deficiency     GERD     Herniated disc     Artificial joint replacement, Knee     Cataract     Post-menopausal HRT     Dry skin syndrome     Cerumen impaction     Nosebleed     Ear pain     Screening for colorectal cancer     Headache          Immunizations:     None        Allergies:     Last Reviewed on 1/19/2018 01:50 PM by Marian Landers    Actonel: Myalgia (Adverse Reaction)    Morphine Sulfate: nausea, tachycardia        Current Medications:     Last Reviewed on 1/19/2018 01:51 PM by Marian Landers    Sodium Chloride 0.65% Nasal Solution 1 squirt each nostril BID     Vitamin D3 5,000IU Capsules 2 tabs daily     Fluticasone Propionate 50mcg/1actuation Nasal Spray 1 spray each nostil daily     Naprosyn 500mg Tablet Take 1 tablet(s) by mouth bid  prn     Synthroid 0.075mg Tablet Take 1 tablet(s) by mouth daily     Multivitamin/Mineral Supplement Tablet Take 1 tablet(s) by mouth daily     Diazepam 5mg Tablet 1/2 to 1 PO BID PRN     citracal + d     Ibuprofen 200mg Tablet prn     PROBIOTIC QD         OBJECTIVE:        Vitals:         Current: 1/19/2018 1:48:56 PM    Ht:  5 ft, 2.75 in;  Wt: 102 lbs;  BMI: 18.2    T: 97.4 F (oral);  BP: 145/76 mm Hg (left arm, sitting);  P: 106 bpm (left arm (BP Cuff), sitting);  sCr: 0.74 mg/dL;  GFR: 65.57        Exams:     PHYSICAL EXAM:     GENERAL: well developed, well nourished;  no apparent distress;     EYES: PERRL, EOMI     E/N/T: EARS: external auditory canal normal;  both TMs are dull;  NOSE: normal turbinates; bilateral maxillary sinus tenderness present; OROPHARYNX: oral mucosa is normal; posterior pharynx shows no exudate;     NECK: range of motion is normal; trachea is midline;     RESPIRATORY: normal respiratory rate and pattern with no distress; normal breath sounds with no rales, rhonchi, wheezes or rubs;     CARDIOVASCULAR: normal rate; rhythm is regular;     NEUROLOGIC: mental status: alert and oriented x 3; GROSSLY INTACT     PSYCHIATRIC: appropriate affect and demeanor;         Procedures:     Acute sinusitis, other     1. Kenalog 40 mg given IM in the left hip; administered by [facility];  lot number [id number]; expires 04/19             ASSESSMENT           461.8   J01.90  Acute sinusitis, other               DDx:     466.0   J20.9  Acute bronchitis              DDx:         ORDERS:         Meds Prescribed:       Zithromax (Azithromycin) 250mg Tablet 2 tablets on day 1, then 1 tablet on days 2-5.  #6 (Six) tablet(s) Refills: 0       Bromfed-DM (Brompheniramine/Dextromethorphan/Pseudoephedrine) Syrup 1  to 2  tsp po every 4-6 hrs prn cough/congestion.  #480 ([place]) ml Refills: 0         Other Orders:       [id number]  Therapeutic injection  (In-House)           Kenalog, per 10 mg (x4)                 PLAN:          Acute sinusitis, other         RECOMMENDATIONS given include: Push Fluids, Rest, Follow up if no improvement or worsening symptoms like high fevers, vomiting, weakness, or increasing shortness of air.     and Continue nasal saline..      FOLLOW-UP: Schedule follow-up appointments on a p.r.n. basis. Chronic visit follow up           Prescriptions:       Zithromax (Azithromycin) 250mg Tablet 2 tablets on day 1, then 1 tablet on days 2-5.  #6 (Six) tablet(s) Refills: 0       Bromfed-DM (Brompheniramine/Dextromethorphan/Pseudoephedrine) Syrup 1  to 2  tsp po every 4-6 hrs prn cough/congestion.  #480 ([place]) ml Refills: 0          Acute bronchitis As above     Steroids Kenalog 40 mg 1 ml           Orders:       [id number]  Therapeutic injection  (In-House)                     Kenalog, per 10 mg (x4)             Patient Recommendations:        For  Acute sinusitis, other:     Schedule follow-up appointments as needed.              CHARGE CAPTURE           **Please note: ICD descriptions below are intended for billing purposes only and may not represent clinical diagnoses**        Primary Diagnosis:         461.8 Acute sinusitis, other            J01.90    Acute sinusitis, unspecified              Orders:          [id number]   Office/outpatient visit; established patient, level 3  (In-House)           466.0 Acute bronchitis            J20.9    Acute bronchitis, unspecified               Orders:          [id number]   Therapeutic injection  (In-House)                                           Kenalog, per 10 mg (x4)

## 2021-05-18 NOTE — PROGRESS NOTES
Juanita Morelos 1955     Office/Outpatient Visit    Visit Date: Wed, Sep 5, 2018 03:29 pm    Provider: Oksana Chapman N.P. (Assistant: Sarah Spurling, MA)    Location: [facility]        Electronically signed by Oksana Chapman N.P. on  2018 05:18:54 PM                             SUBJECTIVE:        CC:     Juanita Lee is a 63 year old White female.  presents today due to cough         HPI:         Patient complains of cough.  It has been present for the past 3 days.  Respiratory symptoms include recent, dry cough and wheezing.   She denies chest congestion or chest tightness.  Other symptoms include allergy symptoms and nasal congestion.  She denies fever, nasal discharge or sinus pain/pressure.  She denies exposure to ill contacts.  She has already tried to relieve the symptoms with fluticasone nasal spray.  Medical history is significant for former smoker and copd noted on cxr 2017.      ROS:     CONSTITUTIONAL:  Negative for chills, fatigue, fever, and weight change.      E/N/T:  Positive for ear pain ( right ) and nasal congestion.   Negative for frequent rhinorrhea or sore throat.      CARDIOVASCULAR:  Negative for chest pain, palpitations, tachycardia, orthopnea, and edema.      RESPIRATORY:  Positive for recent cough ( typically dry ).      MUSCULOSKELETAL:  Negative for arthralgias, back pain, and myalgias.      NEUROLOGICAL:  Negative for dizziness, headaches, paresthesias, and weakness.          PMH/FMH/SH:     Last Reviewed on 2018 01:50 PM by Belkis Berkowitz    Past Medical History:                 PAST MEDICAL HISTORY         Gastroesophageal Reflux Disease     Osteoarthritis    Osteoporosis     hypoglycemia     vit D def         GYNECOLOGICAL HISTORY:             CURRENT MEDICAL PROVIDERS:    Ophthalmologist: ALVA/AAYUSH    Orthopedist: DONNA/NIVIA    VASCULAR-DR EUGENIA HANKINS         PREVENTIVE HEALTH MAINTENANCE             BONE DENSITY: was last done 3-23-16 with  the following abnormality noted-- osteoporosis     COLORECTAL CANCER SCREENING: colonoscopy with the following abnormalities noted-- diverticulosis     MAMMOGRAM: was last done 3-23-16 with normal results scheduled in 2017/dmt         PAST MEDICAL HISTORY                 ADVANCED DIRECTIVES: None         Surgical History:         Appendectomy: ;     Cholecystectomy: ;     Hysterectomy: ;     OOPHRECTOMY      Cataract Removal: /8 surgeriescomplications?;      section: X 2;     Joint Replacement: knee; ;         Family History:         Positive for Hyperlipidemia ( mother ) and Myocardial Infarction ( father ).      Positive for Breast Cancer ( mat. aunt ), Colon Cancer ( mat. uncle ) and Lung Cancer ( mat. uncle; mat. aunt ).      Positive for pulmonary fibrosis=- mat uncle.      Positive for Alzheimer's Disease ( mat. uncle ) and Cerebrovascular Accident ( mat. uncle ).          Social History:     Occupation:    Disabled     Marital Status:      Children: 3 children         Tobacco/Alcohol/Supplements:     Last Reviewed on 2018 02:11 PM by Belkis Berkowitz    Tobacco: She has a past history of cigarette smoking; quit date:  .  Non-drinker         Substance Abuse History:     Last Reviewed on 2018 11:23 AM by Radames Purcell        Mental Health History:     Last Reviewed on 2018 11:23 AM by Radames Purcell        Communicable Diseases (eg STDs):     Last Reviewed on 2018 11:23 AM by Radames Purcell            Current Problems:     Last Reviewed on 2018 11:23 AM by Radames Purcell    Artificial joint replacement, Shoulder     Low back pain     Chronic insomnia     Use of high risk medications     Acquired hypothyroidism, other specified cause     Serous otitis media     Osteoporosis, unspecified     Actinic keratosis     Varicose veins of lower extremities, with Pain     Tension Headache     Unexplained weight loss      Osteoporosis     Acquired hypothyroidism     Essential hypercholesterolemia     Chronic otitis media (OM) with effusion     Generalized anxiety disorder     Vitamin D deficiency     GERD     Herniated disc     Artificial joint replacement, Knee     Cataract     Post-menopausal HRT     Dry skin syndrome     Epigastric abdominal pain     Screening for colorectal cancer     Menopause     Screening breast exam - other     Screening for depression     Headache         Immunizations:     None        Allergies:     Last Reviewed on 6/04/2018 11:05 AM by Coco Lentz    Actonel: Myalgia (Adverse Reaction)    Morphine Sulfate: nausea, tachycardia        Current Medications:     Last Reviewed on 9/05/2018 03:32 PM by Spurling, Sarah C    Diazepam 5mg Tablet 1/2 to 1 PO BID PRN     Synthroid 0.075mg Tablet Take 1 tablet(s) by mouth daily     Sodium Chloride 0.65% Nasal Solution 1 squirt each nostril BID     Vitamin D3 5,000IU Capsules 2 tabs daily     Fluticasone Propionate 50mcg/1actuation Nasal Spray 1 spray each nostil daily     Naprosyn 500mg Tablet Take 1 tablet(s) by mouth bid  prn     Multivitamin/Mineral Supplement Tablet Take 1 tablet(s) by mouth daily     citracal + d     Ibuprofen 200mg Tablet prn     PROBIOTIC QD         OBJECTIVE:        Vitals:         Historical:     08/02/2018  BP:   142/78 mm Hg ( (left arm, , sitting, );)     08/02/2018  Wt:   93.7lbs        Current: 9/5/2018 3:33:29 PM    Ht:  5 ft, 2.75 in;  Wt: 96.8 lbs;  BMI: 17.3    T: 98 F (oral);  BP: 131/61 mm Hg (left arm, sitting);  P: 95 bpm (left arm (BP Cuff), sitting);  sCr: 0.6 mg/dL;  GFR: 78.11    O2 Sat: 97 % (room air)        Exams:     PHYSICAL EXAM:     GENERAL: thin;  no apparent distress;     E/N/T: EARS: left TM is normal and the right TM is has fluid behind it;  NOSE: nasal mucosa is erythematous;  OROPHARYNX: posterior pharynx, including tonsils, tongue, and uvula are normal;     RESPIRATORY: normal respiratory rate and pattern  with no distress; normal breath sounds with no rales, rhonchi, wheezes or rubs;     CARDIOVASCULAR: normal rate; rhythm is regular;     LYMPHATIC: no enlargement of cervical or facial nodes;     MUSCULOSKELETAL:  Normal range of motion, strength and tone;     NEUROLOGIC: mental status: alert and oriented x 3; GROSSLY INTACT     PSYCHIATRIC:  appropriate affect and demeanor; normal speech pattern; grossly normal memory;         ASSESSMENT           381.4   H65.93  Serous otitis media              DDx:         PLAN:          Serous otitis media         RECOMMENDATIONS given include: increased oral fluid intake and start antihistamine- claritin.    continue fluticasone.  declines rx for pro air inhaler or tessalon perles prn.  follow up if not improving..              CHARGE CAPTURE           **Please note: ICD descriptions below are intended for billing purposes only and may not represent clinical diagnoses**        Primary Diagnosis:         381.4 Serous otitis media            H65.93    Unspecified nonsuppurative otitis media, bilateral              Orders:          48734   Office/outpatient visit; established patient, level 3  (In-House)

## 2021-05-18 NOTE — PROGRESS NOTES
Juanita Morelos  1955     Office/Outpatient Visit    Visit Date: Wed, Sep 16, 2020 11:27 am    Provider: Belkis Berkowitz MD (Assistant: Juana Landers LPN)    Location: [facility]        Electronically signed by Belkis Berkowitz MD on  09/16/2020 04:37:32 PM                             Subjective:        CC: fatigue and abd pain    HPI:           PHQ-9 Depression Screening: Completed form scanned and in chart; Total Score 2       generalized abdominal pain, onset a week ago, she is eating well, her BM are normal and almost every day, no melena/BRBPR, she does have pain with urination.  She is worried she might have another ulcer.  She is also having more back pain in lumbar spine region, onset about 2 weeks ago when she started walking.  She has osteoporosis.  No falls.  She has some type of muscle disorder and is going to [employer] but remains undiagnosed, she has to wear a R ankle splint for foot drop.  She  feels very tired.  She is not sleeping well.  She has chronic anxiety but not depressed and no sadness/crying spells, she is very frustrated about her current health state.   Juanita Lee is living with her sister.  Normal EGD in 2019 with Dr Bhatia          Concerning other specified hypothyroidism, she is currently taking Levoxyl, 75 mcg daily.  TSH was last checked 5 months ago.  The result was reported as normal.  Currently, she is experiencing fatigue.  She reports no symptoms suggestive of adverse medication effect.            Juanita Lee is here for a Medicare wellness visit.  The required HRA questions are integrated within this visit note. Family medical history and individual medical/surgical history were reviewed and updated.  A current height, weight, BMI, blood pressure, and pulse were recorded in the vitals section of the note and have been reviewed. Patient's medications, including supplements, were recorded in the chart and reviewed.  Current providers and suppliers were  reviewed and updated.          Self-Assessment of Health: She rates her health as very good. She rates her confidence of being able to control/manage most of her health problems as very confident. Her physical/emotional health has limited her social activites quite a bit.  A review of cognitive impairment was performed, including ability to drive a car, manage finances, and any memory changes, and was found to be negative.  A review of functional ability, including bathing, dressing, walking, and urine/bowel continence as well as level of safety was performed and was found to be negative.  Falls Risk: Has fallen 2 or more times or had one fall with injury in the past year.  She denies having trouble hearing the TV/radio when others do not, having to strain to hear or understand conversations and wearing hearing aid(s).  Concerning home safety, she reports that at home she DOES have adequate lighting, a skid resistant shower/tub, grab bars in the bath, handrails on stairs, functioning smoke alarms and absence of throw rugs.          Immunization Status: ** Has not received pneumococcal vaccination; ** Has not received influenza vaccine for this season; ** Has not received Prevnar 13 vaccination; Age>60, no shingles vaccination; Physical Activity: She exercises but less than 20 minutes 3 days per week; Type of diet patient normally eats is described as well-balanced with fruits and vegetables Tobacco: She has never smoked.   Preventative Health updated today     ROS:     CONSTITUTIONAL:  Positive for fatigue.   Negative for chills or fever.      E/N/T:  Positive for nasal congestion ( clear drainage ).      CARDIOVASCULAR:  Negative for chest pain, orthopnea, paroxysmal nocturnal dyspnea and pedal edema.      RESPIRATORY:  Negative for dyspnea and frequent wheezing.      GASTROINTESTINAL:  Negative for abdominal pain, constipation, diarrhea, nausea and vomiting.      INTEGUMENTARY/BREAST:  Negative for rash.       NEUROLOGICAL:  Positive for weakness.   Negative for dizziness or headaches.      PSYCHIATRIC:  Negative for anxiety, depression, and sleep disturbances.          Past Medical History / Family History / Social History:         Last Reviewed on 2020 12:12 PM by Belksi Berkowitz    Past Medical History:                 PAST MEDICAL HISTORY         Gastroesophageal Reflux Disease     Osteoarthritis    Osteoporosis     hypoglycemia     vit D def         GYNECOLOGICAL HISTORY:             CURRENT MEDICAL PROVIDERS:    Gastroenterologist: LIAN    Neurologist: RODOLFO    Ophthalmologist: AAYUSH    Orthopedist: Farzana         PREVENTIVE HEALTH MAINTENANCE             BONE DENSITY: was last done 3-23-16 with the following abnormality noted-- osteoporosis     COLORECTAL CANCER SCREENING: Up to date (colonoscopy q10y; sigmoidoscopy q5y; Cologuard q3y) was last done 9/3/2010, Results are in chart; colonoscopy with the following abnormalities noted-- diverticulosis     MAMMOGRAM: Done within last 2 years and results in are chart was last done 18 with normal results     Prolia Injection : 1st injection at flaget 19         PAST MEDICAL HISTORY                 ADVANCED DIRECTIVES: None         Surgical History:         Appendectomy: ;     Cholecystectomy: ;     Hysterectomy: ;     OOPHRECTOMY     Cataract Removal: details/ surgeriescomplications?;      section: X 2;     Joint Replacement: knee; ;     Femur Fracture ORIF x 2;         Family History:         Positive for Hyperlipidemia ( mother ) and Myocardial Infarction ( father ).      Positive for Breast Cancer ( mat. aunt ), Colon Cancer ( mat. uncle ) and Lung Cancer ( mat. uncle; mat. aunt ).      Positive for pulmonary fibrosis=- mat uncle.      Positive for Alzheimer's Disease ( mat. uncle ) and Cerebrovascular Accident ( mat. uncle ).          Social History:     Occupation:    Disabled     Marital Status:       Children: 3 children         Tobacco/Alcohol/Supplements:     Last Reviewed on 9/16/2020 11:29 AM by Juana Landers    Tobacco: She has a past history of cigarette smoking; quit date:  1970's.  Non-drinker         Substance Abuse History:     Last Reviewed on 1/28/2020 03:14 PM by Wale Puente        Mental Health History:     Last Reviewed on 1/28/2020 03:14 PM by Wale Puente        Communicable Diseases (eg STDs):     Last Reviewed on 1/28/2020 03:14 PM by Wale Puente        Immunizations:     None        Allergies:     Last Reviewed on 8/24/2020 02:29 PM by Dianne Michel    Actonel: Myalgia  (Adverse Reaction)    Macrobid:      Morphine Sulfate: Nausea,tachycardia         Current Medications:     Last Reviewed on 8/24/2020 02:34 PM by Dianne Michel    latanoprost 0.005 % ophthalmic (eye) Drops [instill 1 drop into affected eye(s) by ophthalmic route once daily inthe evening]    melatonin 3 mg oral tablet    Alaway 0.025 % (0.035 %) ophthalmic (eye) Drops [instill 1 drop into affected eye(s) by ophthalmic route 2 times per day]    ferrous sulfate 325 mg (65 mg iron) oral tablet [take 1 tablet (325 mg) by oral route 1 time per day]    Multivitamin/Mineral Supplement  Tablet [Take 1 tablet(s) by mouth daily]    cholecalciferol (vitamin D3) 125 mcg (5,000 unit) oral capsule [1 tabs daily]    Omeprazole 40 mg oral capsule,delayed release (enteric coated) [1 capsule daily]    Synthroid 0.075mg Tablet [Take 1 tablet(s) by mouth daily]    Ibuprofen 200 mg oral tablet [prn]    Diazepam 5 mg oral tablet [1/2 to 1 PO BID PRN]    ProAir HFA 90 mcg/actuation Inhalation HFA Aerosol Inhaler    Timolol Maleate 0.5 % ophthalmic (eye) Drops [1 drop both eye daily]    Prolia 60 mg/mL subcutaneous Syringe [inject 1 milliliter (60 mg) by subcutaneous route every 6 months ]    DORZOL/TIMOL SOL 22.3-6.8     ONDANSETRON  TAB 8MG     Zithromax Z-Yadiel 250 mg oral tablet [take 2 initially then 1 tablet (250 mg) by oral route  once daily]        Objective:        Vitals:         Current: 9/16/2020 11:29:01 AM    Ht:  5 ft, 2.75 in;  Wt: 98.3 lbs;  BMI: 17.6T: 97.8 F (temporal);  BP: 134/64 mm Hg (left arm, sitting);  P: 84 bpm (left arm (BP Cuff), sitting);  sCr: 0.64 mg/dL;  GFR: 71.86VA: 20/200 OD, 20/40 OS (with correction)        Exams:     PHYSICAL EXAM:     GENERAL: well developed, well nourished;  no apparent distress;     EYES: PERRL, EOMI     E/N/T: EARS: white fluff on external TM L ear, otherwise normal TM B and normal R external ear canal; OROPHARYNX:  normal mucosa, dentition, gingiva, and posterior pharynx;     NECK:  supple, full ROM; no thyromegaly; no carotid bruits;     RESPIRATORY: normal respiratory rate and pattern with no distress; normal breath sounds with no rales, rhonchi, wheezes or rubs;     CARDIOVASCULAR: normal rate; rhythm is regular;     Peripheral Pulses: popliteal: 2+ R;  dorsalis pedis: 2+ R;  posterior tibial: 2+ R;  cyanosis noted in distal foot;     GASTROINTESTINAL: nontender, nondistended; no hepatosplenomegaly or masses; no bruits;     MUSCULOSKELETAL: gait: wheelchair-bound;  R foot drop;     NEUROLOGIC: mental status: alert and oriented x 3; GROSSLY INTACT     PSYCHIATRIC: appropriate affect and demeanor; speech pattern: flat;  normal thought and perception;         Lab/Test Results:         Amphetamines Screen, Urin: Negative (09/16/2020),     BAR-Barbiturates Screen, Urin: Negative (09/16/2020),     Buprenorphine: Negative (09/16/2020),     BZO-Benzodiazepines Screen,Ur: Positive (09/16/2020),     Cocaine(Metab.)Screen, Ur: Negative (09/16/2020),     MDMA-Ecstasy: Negative (09/16/2020),     Met-Methamphetamine: Negative (09/16/2020),     MTD-Methadone Screen, Urine: Negative (09/16/2020),     Opiate Screen, Urine: Negative (09/16/2020),     OXY-Oxycodone: Negative (09/16/2020),     PCP-Phencyclidine Screen, Uri: Negative (09/16/2020),     THC Cannabinoids Screen, Urin: Negative (09/16/2020),      Urine temperature: could NOT confirm (09/16/2020),     Date and time of last pill: DIAZEPAM 9- @ 10pm/al (09/16/2020),     Performed by: [email or web address] (09/16/2020),     Collection Time: 1255 (09/16/2020),             Assessment:         M54.5   Low back pain       Z13.31   Encounter for screening for depression       R30.0   Dysuria       R10.817   Generalized abdominal tenderness       E03.8   Other specified hypothyroidism       D50.9   Iron deficiency anemia, unspecified       E55.9   Vitamin D deficiency, unspecified       K21.9   Gastro-esophageal reflux disease without esophagitis       Z00.00   Encounter for general adult medical examination without abnormal findings       Z23   Encounter for immunization       Z12.31   Encounter for screening mammogram for malignant neoplasm of breast       Z79.899   Other long term (current) drug therapy           ORDERS:         Meds Prescribed:       [Refilled] omeprazole 20 mg oral capsule,delayed release (enteric coated) [take 1 capsule (20 mg) by oral route 2 times per day before meals for10 days in combination with amoxicillin and clarithromycin], #90 (ninety) capsules, Refills: 1 (one)       [Refilled] cholecalciferol (vitamin D3) 125 mcg (5,000 unit) oral capsule [1 tabs daily], #90 (ninety) capsules, Refills: 1 (one)         Radiology/Test Orders:       03249  Radiologic examination, spine, lumbosacral;  minimum of four views  (Send-Out)              Lab Orders:       78330  BDUAM - [facility] Urinalysis, automated, with micro  (Send-Out)            37724  B12FO - [facility] Vitamin B12 with Folate  (Send-Out)            79993  BDCB2 - [facility] CBC w/o diff  (Send-Out)            42734  IRONP - [facility] Iron and TIBC  (Send-Out)            34836  COMP - [facility] Comp. Metabolic Panel  (Send-Out)            10804  HPUBT - [facility] H.pylori Breath test  (Send-Out)            00155  MG - [facility] Magnesium, Serum  (Send-Out)            82789  THYII - [facility] Thyroid panel with TSH (08175, 27853)  (Send-Out)             36887  VITD - [facility] Vitamin D, 25 Hydroxy  (Send-Out)            37663  Drug test prsmv read direct optical obs pr date  (In-House)              Procedures Ordered:         Annual wellness visit, includes a PPPS, subsequent visit  (In-House)              Other Orders:         Depression screen negative  (In-House)                      Plan:         Low back pain        RADIOLOGY:  I have ordered Lumbar/Sacral Spine X-ray to be done today.            Orders:       75872  BDUAM - [facility] Urinalysis, automated, with micro  (Send-Out)            17697  Radiologic examination, spine, lumbosacral;  minimum of four views  (Send-Out)              Encounter for screening for depression    MIPS PHQ-9 Depression Screening: Completed form scanned and in chart; Total Score 2; Negative Depression Screen           Orders:         Depression screen negative  (In-House)              Dysuriaurine is clean        Generalized abdominal tenderness    LABORATORY:  Labs ordered to be performed today include Comprehensive metabolic panel, H.pylori urea breath test ([facility]), and Magnesium level.            Orders:       99942  COMP - [facility] Comp. Metabolic Panel  (Send-Out)            63390  HPUBT - [facility] H.pylori Breath test  (Send-Out)            32912  MG - [facility] Magnesium, Serum  (Send-Out)              Other specified hypothyroidism    LABORATORY:  Labs ordered to be performed today include Thyroid Panel.            Orders:       40531  THYII - [facility] Thyroid panel with TSH (75713, 84685)  (Send-Out)              Iron deficiency anemia, unspecified    LABORATORY:  Labs ordered to be performed today include B12 with Folate, CBC W/O DIFF, and Iron, serum and TIBC.            Orders:       29745  B12FO - [facility] Vitamin B12 with Folate  (Send-Out)            51245  BDCB2 - [facility] CBC w/o diff  (Send-Out)            41670  IRONP - [facility] Iron and TIBC  (Send-Out)              Vitamin D deficiency, unspecified    LABORATORY:  Labs ordered to be  performed today include Vitamin D.            Prescriptions:       [Refilled] omeprazole 20 mg oral capsule,delayed release (enteric coated) [take 1 capsule (20 mg) by oral route 2 times per day before meals for10 days in combination with amoxicillin and clarithromycin], #90 (ninety) capsules, Refills: 1 (one)       [Refilled] cholecalciferol (vitamin D3) 125 mcg (5,000 unit) oral capsule [1 tabs daily], #90 (ninety) capsules, Refills: 1 (one)           Orders:       11077  VITD - [facility] Vitamin D, 25 Hydroxy  (Send-Out)              Gastro-esophageal reflux disease without esophagitisstable on omeprazole        RECOMMENDATIONS given include: patient is aware of increased risk of kidney failure, osteoporosis and alzheimers with daily use of this med.          Encounter for general adult medical examination without abnormal findingsMEDICARE WELLNESS EXAM-SHE IS DUE FOR MAMMOGRAM, UTD on/ DEXA, UTD ON COLONOSCOPY, DEFERS PAP/PELVIC,  SHE DEFERS FLU VACCINATION, RECOMMEND SHINGLES VACCINE, PNEUMONIA AND PREVNAR TDAP UTD. INCREASED FALL RISK, NO MEMORY ISSUES, DEPRESSION IS STABLE, SHE LIVES WITH HER SISTER, SHE DOES NOT DRIVEBUT IS ABLE TO  PERFORM ADL'S/DO OWN FINANCES, SHE WAS GIVEN A HA ON  A LIVING WILL AND A SAFETY AND PREV SERVICES HANDOUT WAS GIVEN TO HER.  HEARING IS ADEQUATE, RECOMMEND YEARLY EYE EXAM. MD LIST UPDATED          Orders:         Annual wellness visit, includes a PPPS, subsequent visit  (In-House)              Encounter for immunizationShe needs shingrix, tdap, pneumovax, prevnar and flu vaccine-she defers all         Other long term (current) drug therapy          Orders:       45089  Drug test prsmv read direct optical obs pr date  (In-House)                  Charge Capture:         Primary Diagnosis:     M54.5  Low back pain           Orders:      17602-44  Office/outpatient visit; established patient, level 4  (In-House)              Z13.31  Encounter for screening for depression            Orders:        Depression screen negative  (In-House)              R30.0  Dysuria     R10.817  Generalized abdominal tenderness     E03.8  Other specified hypothyroidism     D50.9  Iron deficiency anemia, unspecified     E55.9  Vitamin D deficiency, unspecified     K21.9  Gastro-esophageal reflux disease without esophagitis     Z00.00  Encounter for general adult medical examination without abnormal findings           Orders:        Annual wellness visit, includes a PPPS, subsequent visit  (In-House)              Z23  Encounter for immunization     Z12.31  Encounter for screening mammogram for malignant neoplasm of breast     Z79.899  Other long term (current) drug therapy           Orders:      11491  Drug test prsmv read direct optical obs pr date  (In-House)

## 2021-05-18 NOTE — PROGRESS NOTES
Juanita Morelos  1955     Office/Outpatient Visit    Visit Date:  03:20 pm    Provider: Belkis Berkowitz MD (Assistant: Spurling, Sarah C, MA)    Location: [facility]        Electronically signed by Belkis Berkowitz MD on  2020 05:01:06 PM                             Subjective:        CC: TELEHEALTH- CONSENT BY MNP, recurrent cough    HPI:           Juanita Lee presents with acute bronchitis.  It has been present for the past 2 weeks.  Respiratory symptoms include chest congestion, productive, green and intermittently clear cough and wheezing.   She denies sinus pressure or shortness of breath.  Other symptoms include headache, nasal discharge, sinus pain/pressure and nausea with abdominal cramps and diarrhea.  She denies body aches, edema, fever or nasal congestion.  She reports recent exposure to illness from recently in the hospital.  She has already tried to relieve the symptoms with biaxin and promethazine DM.      ROS:     CONSTITUTIONAL:  Positive for fatigue.   Negative for chills or fever.      EYES:  Negative for blurred vision.      CARDIOVASCULAR:  Negative for chest pain, orthopnea, paroxysmal nocturnal dyspnea and pedal edema.      RESPIRATORY:  Positive for dyspnea and frequent wheezing.   Negative for recent cough.      GASTROINTESTINAL:  Negative for abdominal pain, constipation, diarrhea, nausea and vomiting.      INTEGUMENTARY/BREAST:  Negative for rash.      NEUROLOGICAL:  Positive for dizziness and headaches.      PSYCHIATRIC:  Negative for anxiety, depression, and sleep disturbances.          Past Medical History / Family History / Social History:         Last Reviewed on 2020 03:55 PM by Belkis Berkowitz    Past Medical History:                 PAST MEDICAL HISTORY         Gastroesophageal Reflux Disease     Osteoarthritis    Osteoporosis     hypoglycemia     vit D def         GYNECOLOGICAL HISTORY:             CURRENT MEDICAL PROVIDERS:     Ophthalmologist: ALVA/AAYUSH    Orthopedist: DONAN/NIVIA    VASCULAR-DR EUGENIA HANKINS         PREVENTIVE HEALTH MAINTENANCE             BONE DENSITY: was last done 3-23-16 with the following abnormality noted-- osteoporosis     COLORECTAL CANCER SCREENING: Up to date (colonoscopy q10y; sigmoidoscopy q5y; Cologuard q3y) was last done 9/3/2010, Results are in chart; colonoscopy with the following abnormalities noted-- diverticulosis     MAMMOGRAM: Done within last 2 years and results in are chart was last done 18 with normal results     Prolia Injection : 1st injection at flaget 19         PAST MEDICAL HISTORY                 ADVANCED DIRECTIVES: None         Surgical History:         Appendectomy: ;     Cholecystectomy: ;     Hysterectomy: ;     OOPHRECTOMY     Cataract Removal: / surgeriescomplications?;      section: X 2;     Joint Replacement: knee; ;         Family History:         Positive for Hyperlipidemia ( mother ) and Myocardial Infarction ( father ).      Positive for Breast Cancer ( mat. aunt ), Colon Cancer ( mat. uncle ) and Lung Cancer ( mat. uncle; mat. aunt ).      Positive for pulmonary fibrosis=- mat uncle.      Positive for Alzheimer's Disease ( mat. uncle ) and Cerebrovascular Accident ( mat. uncle ).          Social History:     Occupation:    Disabled     Marital Status:      Children: 3 children         Tobacco/Alcohol/Supplements:     Last Reviewed on 2020 03:20 PM by Spurling, Sarah C    Tobacco: She has a past history of cigarette smoking; quit date:  .  Non-drinker         Substance Abuse History:     Last Reviewed on 2020 03:14 PM by Wale Puente        Mental Health History:     Last Reviewed on 2020 03:14 PM by Wale Puente        Communicable Diseases (eg STDs):     Last Reviewed on 2020 03:14 PM by Wale Puente        Allergies:     Last Reviewed on 2020 03:20 PM by Spurling, Sarah C     Actonel: Myalgia  (Adverse Reaction)    Macrobid:      Morphine Sulfate: Nausea,tachycardia         Current Medications:     Last Reviewed on 4/16/2020 03:21 PM by Spurling, Sarah C    latanoprost 0.005 % ophthalmic (eye) Drops [instill 1 drop into affected eye(s) by ophthalmic route once daily inthe evening]    hormone essentials     menopause essentials     Fish Oil 360-1,200 mg oral capsule    melatonin 3 mg oral tablet    Alaway 0.025 % (0.035 %) ophthalmic (eye) Drops [instill 1 drop into affected eye(s) by ophthalmic route 2 times per day]    Multivitamin/Mineral Supplement  Tablet [Take 1 tablet(s) by mouth daily]    cholecalciferol (vitamin D3) 5,000 unit oral capsule [2 tabs daily]    Omeprazole 40 mg oral capsule,delayed release (enteric coated) [1 capsule daily]    Synthroid 0.075mg Tablet [Take 1 tablet(s) by mouth daily]    Ibuprofen 200 mg oral tablet [prn]    Diazepam 5 mg oral tablet [1/2 to 1 PO BID PRN]    ProAir HFA 90 mcg/actuation Inhalation HFA Aerosol Inhaler    Timolol Maleate 0.5 % ophthalmic (eye) Drops [1 drop both eye daily]    Prolia 60 mg/mL subcutaneous Syringe [inject 1 milliliter (60 mg) by subcutaneous route every 6 months ]    traZODone 50 mg oral tablet [take 1 tablet (50 mg) by oral route QHS]    sertraline 25 mg oral tablet [take 1 tablet (25 mg) by oral route once daily]    POT CHLORIDE CAP 10MEQ ER     MELOXICAM    TAB 7.5MG     DORZOL/TIMOL SOL 22.3-6.8     ONDANSETRON  TAB 8MG     brompheniramine-pseudoeph-DM 2-30-10 mg/5 mL oral Syrup [take 5-10 milliliters by oral route every 4-6 hours]        Objective:        Vitals:         Current: 4/16/2020 4:13:50 PM    Ht:  5 ft, 2.75 inT: 97.6 F (oral);  BP: 130/76 mm Hg (right arm, sitting);  P: 84 bpm (right arm (BP Cuff), sitting);  sCr: 0.64 mg/dL;  GFR: 87.19        Assessment:         J20   Acute bronchitis           ORDERS:         Meds Prescribed:       [Recorded] levoFLOXacin 750 mg oral tablet [take 1 tablet (750 mg) by  oral route once daily x 3 days]                 Plan:         Acute bronchitisshe was completely better but then the last 2 days her cough returned and has become productive again, no fever/SOA/MARIA        RECOMMENDATIONS given include: Push Fluids, Rest, Follow up if no improvement or worsening symptoms like high fevers, vomiting, weakness, or increasing shortness of air.    .  Telehealth: Verbal consent obtained for visit to occur via phone call; Staff, other than provider, present during telephone visit include Santa Winston; Total time spent was 7 minutes; [id number]--Wrqoxxbyx E/M 5-10 minutes           Prescriptions:       [Recorded] levoFLOXacin 750 mg oral tablet [take 1 tablet (750 mg) by oral route once daily x 3 days]             Charge Capture:         Primary Diagnosis:     J20  Acute bronchitis           Orders:      [id number]  Phys/QHP telephone evaluation 5-10 min  (In-House)

## 2021-05-18 NOTE — PROGRESS NOTES
Juanita Morelos  1955     Office/Outpatient Visit    Visit Date: Mon, May 3, 2021 02:50 pm    Provider: Belkis Berkowitz MD (Assistant: Linda Grace,  )    Location: [facility]        Electronically signed by Belkis Berkowitz MD on  05/04/2021 10:04:03 AM                             Subjective:        CC: PT NOT TAKING PROLIA, ONDANSETRON, CEFDINIR, PANTOPRAZOLE, NYSTATIN, OR DOXCYCLIN.Dox Video [phone] Rosa is a 66 year old White female.  She is here today following a transition of care from an inpatient hospital: [facility]. The patient was admitted on 4/20 and discgarge 04/26. Our office called the patient within 48 hours of discharge and scheduled the follow-up appointment Our office tried to contact the patient within 48 hours of discharge.  Several messages were left to. During the patient's hospital stay the patient was treated by , Kristen Clemente, Dr. Spangler, Dr. Page and Dr Boone for A fib..          HPI:       Richelle is a 67 yo with ALS and underlying COPD, uses bipap at night, and she presented to the ER with acute SOA, she is very frail, HR 160s, and she had recently been hospitalized with pneumonia.  She is not able to move her LE from the hips down.  She was admitted to the cardiology unit  for new onset of atrial fibrillation with rapid ventricular response, started on a cardizem drip, and cardiology was consulted.  Her cardiac enzymes were elevated adn she was diagnosed with a non ST MI secondary to demand ischemia from the afib.  Xray showed R basilar atelectasis or fibrosis and trace B pleural effusions.  Echo showed normal EF with moderate to severe mitral regurg and mod pulm hypertension. She was able to be rate controlled with metoprolol.  CHADs score showed she would benefit for blood thinner so eliquis was started.  For her COPD she was treated with oxygen and prn albuterol nebs.  She was d/c home in stable condition    ROS:     CONSTITUTIONAL:  Positive for  fatigue.   Negative for chills or fever.      CARDIOVASCULAR:  Positive for pedal edema.   Negative for chest pain, orthopnea or paroxysmal nocturnal dyspnea.      RESPIRATORY:  Positive for recent cough ( mild ), dyspnea ( with mild exertion ) and frequent wheezing.      GASTROINTESTINAL:  Negative for abdominal pain, constipation, diarrhea, nausea and vomiting.      MUSCULOSKELETAL:  Positive for weakness in LE.      INTEGUMENTARY/BREAST:  Negative for rash.      NEUROLOGICAL:  Positive for weakness.   Negative for dizziness or headaches.      PSYCHIATRIC:  Negative for anxiety, depression, and sleep disturbances.          Past Medical History / Family History / Social History:         Last Reviewed on 2021 03:42 PM by Belkis Berkowitz    Past Medical History:                 PAST MEDICAL HISTORY         Pneumonia     Gastroesophageal Reflux Disease     Osteoarthritis    Osteoporosis    ALS     hypoglycemia     vit D def         GYNECOLOGICAL HISTORY:             CURRENT MEDICAL PROVIDERS:    Gastroenterologist: LIAN    Neurologist: RODOLFO    Ophthalmologist: AAYUSH    Orthopedist: Alicia/NIVIA         PREVENTIVE HEALTH MAINTENANCE             BONE DENSITY: was last done 2019 with the following abnormality noted-- osteoporosis     COLORECTAL CANCER SCREENING: Up to date (colonoscopy q10y; sigmoidoscopy q5y; Cologuard q3y) was last done 10/2019, Results are in chart; colonoscopy with the following abnormalities noted-- diverticulosis     EYE EXAM: was last done 2020 Dr Bartholomew     MAMMOGRAM: Done within last 2 years and results in are chart was last done 2019 with normal results     Prolia Injection : 1st injection at flaget 19         PAST MEDICAL HISTORY                 ADVANCED DIRECTIVES: None         Surgical History:         Appendectomy: ;     Cholecystectomy: ;     Hysterectomy: ;     OOPHRECTOMY     Cataract Removal: details/8 surgeriescomplications?;       section: X 2;     Joint Replacement: knee; 1992;     Femur Fracture ORIF x 2;         Family History:         Positive for Hyperlipidemia ( mother ) and Myocardial Infarction ( father ).      Positive for Breast Cancer ( mat. aunt ), Colon Cancer ( mat. uncle ) and Lung Cancer ( mat. uncle; mat. aunt ).      Positive for pulmonary fibrosis=- mat uncle.      Positive for Alzheimer's Disease ( mat. uncle ) and Cerebrovascular Accident ( mat. uncle ).          Social History:     Occupation:    Disabled     Marital Status:      Children: 3 children         Tobacco/Alcohol/Supplements:     Last Reviewed on 3/31/2021 12:04 PM by Dalia Art    Tobacco: She has a past history of cigarette smoking; quit date:  1970's.  Non-drinker         Substance Abuse History:     Last Reviewed on 1/28/2020 03:14 PM by Wale Puente        Mental Health History:     Last Reviewed on 1/28/2020 03:14 PM by Wale Puente        Communicable Diseases (eg STDs):     Last Reviewed on 1/28/2020 03:14 PM by Wale Puente        Allergies:     Last Reviewed on 3/31/2021 12:04 PM by Dalia Art    Actonel: Myalgia  (Adverse Reaction)    Macrobid:      Morphine Sulfate: Nausea,tachycardia         Current Medications:     Last Reviewed on 5/03/2021 02:50 PM by Linda Grace    cefdinir 300 mg oral capsule [take 1 capsule (300 mg) by oral route every 12 hours times 3 days]    pantoprazole 40 mg oral tablet, delayed release (enteric coated) [take 1 tablet (40 mg) by oral route 1 time per day]    latanoprost 0.005 % ophthalmic (eye) Drops [instill 1 drop into affected eye(s) by ophthalmic route once daily inthe evening]    MagOx 400 mg (241.3 mg magnesium) oral tablet [1 tab BID ]    Multivitamin/Mineral Supplement  Tablet [Take 1 tablet(s) by mouth daily]    cholecalciferol (vitamin D3) 125 mcg (5,000 unit) oral capsule [1 tabs daily]    Synthroid 0.075mg Tablet [Take 1 tablet(s) by mouth daily]    diazePAM 5 mg oral tablet  [TAKE 1/2  TABLET BY MOUTH TWO TIMES A DAY AS NEEDED]    ProAir HFA 90 mcg/actuation Inhalation HFA Aerosol Inhaler    Timolol Maleate 0.5 % ophthalmic (eye) Drops [1 drop both eye daily]    Prolia 60 mg/mL subcutaneous Syringe [inject 1 milliliter (60 mg) by subcutaneous route every 6 months ]    DORZOL/TIMOL SOL 22.3-6.8     ONDANSETRON  TAB 8MG     ANORO ELLIPT AER 62.5-25  [one inhalation daIly]    Mucinex DM  mg oral Tablet, Extended Release 12 hr [take 1  tablet by oral route every 12 hours ]    Singulair 10 mg oral tablet [take 1 tablet (10 mg) by oral route once daily in the evening]    nystatin 100,000 unit/mL oral Suspension [swish and swallow 5-10 ml  qid x 10 days]    DOXYCYCL HYC CAP 50MG     baclofen 20 mg oral tablet [take 1 tablet (20 mg) by oral route 3 times per day prn muscle spasm]    albuterol sulfate 1.25 mg/3 mL Inhalation Solution for Nebulization [inhale 3 milliliters (1.25 mg) via nebulizer by inhalation route 4 times per day]    nebulizer and compressor  [use with albuterol as directed dx J44.1]    DIGOXIN      TAB 0.125MG     ELIQUIS      TAB 5MG     FUROSEMIDE   TAB 40MG     METOPROL TAR TAB 25MG     POT CHLORIDE SOL 10%         Objective:        Vitals:         Current: 5/3/2021 3:45:56 PM    Ht:  5 ft, 2.75 in;  Wt: 100 lbs;  BMI: 17.9T: 97 F (temporal);  BP: 94/60 mm Hg (left arm, sitting);  P: 75 bpm (left arm (BP Cuff), sitting);  R: 18 bpm;  sCr: 0.41 mg/dL;  GFR: 111.53O2 Sat: 97 % (room air)        Exams:     PHYSICAL EXAM:     GENERAL:  well developed and nourished; appropriately groomed; in no apparent distress;     EYES: extraocular movements intact; conjunctiva and cornea are normal;     RESPIRATORY: normal respiratory rate and pattern with no distress;     MUSCULOSKELETAL: normal overall tone     NEUROLOGIC: mental status: alert and oriented x 3;     PSYCHIATRIC: appropriate affect and demeanor; normal psychomotor function; normal speech pattern;         Assessment:          G12.21   Amyotrophic lateral sclerosis       I48.91   Unspecified atrial fibrillation       F41.9   Anxiety disorder, unspecified       J44.9   Chronic obstructive pulmonary disease, unspecified           ORDERS:         Meds Prescribed:       [Refilled] albuterol sulfate 90 mcg/actuation Inhalation HFA Aerosol Inhaler [inhale 1 - 2 puffs (90 - 180 mcg) by inhalation route every 4 hours as needed], #18 (eighteen) grams, Refills: 1 (one)       [Refilled] diazePAM 5 mg oral tablet [TAKE 1/2  TABLET BY MOUTH TWO TIMES A DAY AS NEEDED], #45 (forty five) tablets, Refills: 1 (one)                 Plan:         Amyotrophic lateral sclerosisshe is declining, she is no longer able to walkshe [facility]        Unspecified atrial fibrillationBB is sedating her, so Wale Page decreased her to 50 mg bid and decreased her lasix to 40 mg 1/2 po QOD with K,  Hold digoxin 0.125 mg if HR <60.  She is newly on eliquis and tolerating this med well.        Anxiety disorder, unspecifiedstable on prn valium, she takes at bedtime and needs meds refilled        Chronic obstructive pulmonary disease, unspecifiedBack to baseline, cont bipap and oxygen at night, albuterol inhaler refilled          Prescriptions:       [Refilled] albuterol sulfate 90 mcg/actuation Inhalation HFA Aerosol Inhaler [inhale 1 - 2 puffs (90 - 180 mcg) by inhalation route every 4 hours as needed], #18 (eighteen) grams, Refills: 1 (one)       [Refilled] diazePAM 5 mg oral tablet [TAKE 1/2  TABLET BY MOUTH TWO TIMES A DAY AS NEEDED], #45 (forty five) tablets, Refills: 1 (one)             Charge Capture:         Primary Diagnosis:     G12.21  Amyotrophic lateral sclerosis           Orders:      02238  Transitional care manage service 14 day discharge  (In-House)              I48.91  Unspecified atrial fibrillation     F41.9  Anxiety disorder, unspecified     J44.9  Chronic obstructive pulmonary disease, unspecified          ADDENDUMS:      ____________________________________    Addendum: 05/10/2021 12:03 PM - Belkis Nelson        ORDER FOR HOSPITAL BED GIVEN:  This patient requires the head of the bed to be elevated more than 30 degrees most of the time due to ALS, chronic COPD. Pillows used to prop up have failed to provide relief of shortness of breath. Roho mattress is also needed due to developing pressure ulcer.  DR Nelson        Addendum: 05/10/2021 12:32 PM - One, Team         Visit Note Faxed to:        [facility] ; Number [phone]            Addendum: 05/10/2021 12:32 PM - One, Team        faxed OV with add to [facility] ./km        Addendum: 05/11/2021 04:49 PM - One, Team        Detailed written order completed and faxed to [facility] at 4:38 pm ./km        Addendum: 05/12/2021 08:49 AM - One, Team        Family called requesting a roho mattress I spoke with [facility] and they do not carry a roho mattress only a gel pressure mattress pad sister daniele tamayo, and that she would have to talk with the insurance and [facility]  because according to mandy the next mattress would be a low air loss and she has to have a stage 3 pressure ulcer or greater for the insurance to cover it , message forwarded to DR Belkis Nelson also ./km        Addendum: 05/13/2021 10:29 AM - One, Team        Family called and reported that pt is red all over and the current mattress is not going to work pt is unable to reposition herself . I called [facility] and talk with Sonia and informed her of the information I was given yesterday about mattress options and told her that pt is unable to reposition self  she has ALS and COPD and per family she has a pressure ulcer on buttocks . She said that they would need an add to this note to state that pt is unable to reposition herself and documentation of the pressure ulcer location,size and stage and you can order a ALLISON mattress and pump . I informed her I will have to call home health nurse  for the documentation on the skin issues because we have only seen the pt with a telehealth and have not assessed area ./km        Addendum: 05/13/2021 10:30 AM - One, Team        left a message for Oneyda Duncan [place] [facility] nurse to return call [phone]./km        Addendum: 05/13/2021 11:39 AM - One, Team        I spoke with Onyeda Duncan and she is going to send the info that she has on the pt's skin to [facility] . Dr Nelson requested pictures of skin issues for review and Oneyda said that she is unable to do that request securely so it will have to come from the sister daniele . Also discussed concerns with Oneyda regarding that pt is at high risk for skin break down and that pt's sister Daniele has voiced that pt is unable to turn onto either side due previous hip fx on one side and shoulder injury on the other . I personally asked Daniele if she is waking up every 2 hours to turn pt  and she said no . I expressed the importance of this and asked if they had thought about 24 hour care due to pt decline and she said no she does not think she would like that ./km        Addendum: 05/13/2021 11:44 AM - One, Team        Sister Daniele inf of the need for pictures of skin issues for md to review ./km        Addendum: 05/13/2021 03:02 PM - Belkis Nelson        ADDENDUM BY DR NELSON:  THIS PT IS UNABLE TO REPOSITION HERSELF DUE TO ALS WITH SEVERE MUSCULAR WEAKNESS AND NEEDS AN ALLISON MATTRESS AND PUMP AND AN .  DR NELSON            Addendum: 05/13/2021 04:02 PM - One, Team        Chart add and new order faxed to [facility] no pictures have been sent as of this time for MD to review ./km

## 2021-05-18 NOTE — PROGRESS NOTES
Juanita Morelos  1955     Office/Outpatient Visit    Visit Date: Wed, Feb 26, 2020 10:33 am    Provider: Belkis Berkowitz MD (Assistant: Santa Winston MA)    Location: [facility]        Electronically signed by Belkis Berkowitz MD on  02/26/2020 02:54:10 PM                             Subjective:        CC: Juanita Lee is a 64 year old White female.  This is a follow-up visit.          HPI:           PHQ-9 Depression Screening: Completed form scanned and in chart; Total Score 3       Juanita Lee has acute onset of R foot drop and has seen Dr Michaud, ortho, and had xrays that showed inflammation on outer ankle, and she is s/p steroid injection in the L ankle, she went to Dr Bhardwaj a neurosurgeon at  and he worked her up for the foot drop, a C/T/L spine MRI showed mild L3/4 bulging disc with hemangiomas in T spine, bone scan confirmed this, MRI brain was normal, EMG showed reduced CMAP amplitude of R fibular and tibial motor responses with 1-2+ fibrillatin noted in L5/S1 myotpynea B, CTA of R LE showed questionable PVD-she saw the vascular specialist who told her she did not have PVD.  She is now going to see DR Blanca a neurologist in [place].  Today she has increased pain and worsening foot drop with the feeling of cold from her knee to her foot R side, her color and pulses are good.      Juanita Lee is extremely stressed and anxious, over her recent decline in her health, she is on valium prn and     ROS:     CONSTITUTIONAL:  Positive for fatigue and unintentional weight loss.   Negative for chills or fever.      EYES:  Negative for blurred vision.      E/N/T:  Positive for ear pain.   Negative for nasal congestion or frequent rhinorrhea.      CARDIOVASCULAR:  Negative for chest pain, orthopnea, paroxysmal nocturnal dyspnea and pedal edema.      RESPIRATORY:  Negative for dyspnea and frequent wheezing.      GASTROINTESTINAL:  Positive for acid reflux symptoms, diarrhea and nausea.   Negative for  abdominal pain, constipation or vomiting.      GENITOURINARY:  Negative for dysuria, nocturia, polyuria and vaginal discharge.      MUSCULOSKELETAL:  Positive for ankle pain.      NEUROLOGICAL:  Negative for dizziness, headaches, paresthesias, and weakness.      PSYCHIATRIC:  Negative for anxiety, depression, and sleep disturbances.          Past Medical History / Family History / Social History:         Last Reviewed on 2020 03:14 PM by Wale Puente    Past Medical History:                 PAST MEDICAL HISTORY         Gastroesophageal Reflux Disease     Osteoarthritis    Osteoporosis     hypoglycemia     vit D def         GYNECOLOGICAL HISTORY:             CURRENT MEDICAL PROVIDERS:    Ophthalmologist: ALVA/AAYUSH    Orthopedist: DONNA/NIVIA    VASCULAR-DR EUGENIA HANKINS         PREVENTIVE HEALTH MAINTENANCE             BONE DENSITY: was last done 3-23-16 with the following abnormality noted-- osteoporosis     COLORECTAL CANCER SCREENING: Up to date (colonoscopy q10y; sigmoidoscopy q5y; Cologuard q3y) was last done 9/3/2010, Results are in chart; colonoscopy with the following abnormalities noted-- diverticulosis     MAMMOGRAM: Done within last 2 years and results in are chart was last done 18 with normal results     Prolia Injection : 1st injection at [facility] 19         PAST MEDICAL HISTORY                 ADVANCED DIRECTIVES: None         Surgical History:         Appendectomy: ;     Cholecystectomy: ;     Hysterectomy: ;     OOPHRECTOMY     Cataract Removal: details/8 surgeriescomplications?;      section: X 2;     Joint Replacement: knee; ;         Family History:         Positive for Hyperlipidemia ( mother ) and Myocardial Infarction ( father ).      Positive for Breast Cancer ( mat. aunt ), Colon Cancer ( mat. uncle ) and Lung Cancer ( mat. uncle; mat. aunt ).      Positive for pulmonary fibrosis=- mat uncle.      Positive for Alzheimer's Disease ( mat. uncle )  and Cerebrovascular Accident ( mat. uncle ).          Social History:     Occupation:    Disabled     Marital Status:      Children: 3 children         Tobacco/Alcohol/Supplements:     Last Reviewed on 1/28/2020 03:14 PM by Wale Puente    Tobacco: She has a past history of cigarette smoking; quit date:  1970's.  Non-drinker         Substance Abuse History:     Last Reviewed on 1/28/2020 03:14 PM by Wale Puente        Mental Health History:     Last Reviewed on 1/28/2020 03:14 PM by Wale Puente        Communicable Diseases (eg STDs):     Last Reviewed on 1/28/2020 03:14 PM by Wale Puente        Allergies:     Last Reviewed on 1/28/2020 03:14 PM by Wale Puente    Actonel: Myalgia  (Adverse Reaction)    Macrobid:      Morphine Sulfate: Nausea,tachycardia         Current Medications:     Last Reviewed on 2/26/2020 10:43 AM by Santa Winston    Multivitamin/Mineral Supplement  Tablet [Take 1 tablet(s) by mouth daily]    cholecalciferol (vitamin D3) 5,000 unit oral capsule [2 tabs daily]    Omeprazole 40 mg oral capsule,delayed release (enteric coated) [1 capsule daily]    Synthroid 0.075mg Tablet [Take 1 tablet(s) by mouth daily]    Ibuprofen 200 mg oral tablet [prn]    Diazepam 5 mg oral tablet [1/2 to 1 PO BID PRN]    ProAir HFA 90 mcg/actuation Inhalation HFA Aerosol Inhaler    Timolol Maleate 0.5 % ophthalmic (eye) Drops [1 drop both eye daily]    Prolia 60 mg/mL subcutaneous Syringe [inject 1 milliliter (60 mg) by subcutaneous route every 6 months ]    latanoprost 0.005 % ophthalmic (eye) Drops [instill 1 drop into affected eye(s) by ophthalmic route once daily inthe evening]    hormone essentials     menopause essentials     Fish Oil 360-1,200 mg oral capsule    melatonin 3 mg oral tablet    Alaway 0.025 % (0.035 %) ophthalmic (eye) Drops [instill 1 drop into affected eye(s) by ophthalmic route 2 times per day]        Objective:        Vitals:         Current: 2/26/2020 10:45:34  AM    Ht:  5 ft, 2.75 in;  Wt: 96.6 lbs;  BMI: 17.2T: 97.4 F (oral);  BP: 113/48 mm Hg (right arm, sitting);  P: 82 bpm (right arm (BP Cuff), sitting);  sCr: 0.64 mg/dL;  GFR: 72.25        Exams:     PHYSICAL EXAM:     GENERAL: well developed, well nourished;  no apparent distress;     EYES: PERRL, EOMI     E/N/T: EARS: white fluff on external TM L ear, otherwise normal TM B and normal R external ear canal; OROPHARYNX:  normal mucosa, dentition, gingiva, and posterior pharynx;     NECK:  supple, full ROM; no thyromegaly; no carotid bruits;     RESPIRATORY: normal respiratory rate and pattern with no distress; normal breath sounds with no rales, rhonchi, wheezes or rubs;     CARDIOVASCULAR: normal rate; rhythm is regular;     Peripheral Pulses: popliteal: 2+ R;  dorsalis pedis: 2+ R;  posterior tibial: 2+ R;  cyanosis noted in distal foot;     GASTROINTESTINAL: nontender, nondistended; no hepatosplenomegaly or masses; no bruits;     MUSCULOSKELETAL: gait: wheelchair-bound;  R foot drop;     NEUROLOGIC: mental status: alert and oriented x 3; GROSSLY INTACT     PSYCHIATRIC: appropriate affect and demeanor; speech pattern: flat;  normal thought and perception;         Lab/Test Results:         Amphetamines Screen, Urin: Negative (02/26/2020),     BAR-Barbiturates Screen, Urin: Negative (02/26/2020),     Buprenorphine: Negative (02/26/2020),     BZO-Benzodiazepines Screen,Ur: Positive (02/26/2020),     Cocaine(Metab.)Screen, Ur: Negative (02/26/2020),     MDMA-Ecstasy: Negative (02/26/2020),     Met-Methamphetamine: Negative (02/26/2020),     MTD-Methadone Screen, Urine: Negative (02/26/2020),     Opiate Screen, Urine: Negative (02/26/2020),     OXY-Oxycodone: Negative (02/26/2020),     PCP-Phencyclidine Screen, Uri: Negative (02/26/2020),     TCA-Tricyclic Antidepressants: Negative (02/26/2020),     THC Cannabinoids Screen, Urin: Negative (02/26/2020),     Urine temperature: confirmed (02/26/2020),     Date and time of  last pill: diazepam 2/25/20 @ 9pm   /mnp (02/26/2020),     Performed by: TL (02/26/2020),     Collection Time: 04490 (02/26/2020),             Assessment:         Z13.31   Encounter for screening for depression       M21.371   Foot drop, right foot       I50.20   Unspecified systolic (congestive) heart failure       F41.1   Generalized anxiety disorder       Z79.899   Other long term (current) drug therapy       F51.01   Primary insomnia       M25.50   Pain in unspecified joint       H60.92   Unspecified otitis externa, left ear           ORDERS:         Meds Prescribed:       [Recorded] traZODone 50 mg oral tablet [take 1 tablet (50 mg) by oral route 3 times per day]       [Recorded] sertraline 25 mg oral tablet [take 1 tablet (25 mg) by oral route once daily]       [Refilled] traZODone 50 mg oral tablet [take 1 tablet (50 mg) by oral route QHS], #30 (thirty) tablets, Refills: 2 (two)       [Refilled] sertraline 25 mg oral tablet [take 1 tablet (25 mg) by oral route once daily], #30 (thirty) tablets, Refills: 2 (two)       [Refilled] Diazepam 5 mg oral tablet [1/2 to 1 PO BID PRN], #45 (forty five) tablets, Refills: 0 (zero)         Radiology/Test Orders:       [id number]  Echocardiography, transthoracic, real-time w image (2D), w M-mode, w spectral & color flow Doppler  (Send-Out)              Lab Orders:       [id number]  Drug test prsmv read direct optical obs pr date  (In-House)            33960  LYSCR - [facility] Lyme Ab/Western Blot Reflex  (Send-Out)            [id number]  RAPII - [facility] Arthritis Profile  (Send-Out)              Other Orders:         Depression screen negative  (In-House)                      Plan:         Encounter for screening for depression    MIPS PHQ-9 Depression Screening: Completed form scanned and in chart; Total Score 3; Negative Depression Screen           Orders:         Depression screen negative  (In-House)              Foot drop, right footcont brace and she is to see neurology for work up  for ALS and MS, MRI brain and entire spine all normal        Unspecified systolic (congestive) heart failureCXR showed mild cardiomegaly and BNP was elevated in 1200s, she is now on lasix and needs an echo.         TESTS/PROCEDURES:  Will proceed with ECHO to be performed/scheduled now.            Orders:       [id number]  Echocardiography, transthoracic, real-time w image (2D), w M-mode, w spectral & color flow Doppler  (Send-Out)              Generalized anxiety disorderstart zoloft for anxiety, welbutrin refilled, start trazodone for insomnia        Other long term (current) drug therapy        RECOMMENDATIONS given include: amos reviewed, drug screen performed and appropriate, consent is reviewed and signed and on the chart, she is aware of risk of addiction on this medication and understands that she will need to follow up for a review every 3 months and her medications will be adjusted or decreased as deemed appropriate at each visit.  No personal history of drug or alcohol abuse.  No concerns about diversion or abuse.  She denies side effects related to the medication.  She is  aware that she may be called in for pill counts..            Orders:       [id number]  Drug test prsmv read direct optical obs pr date  (In-House)              Primary insomnia          Prescriptions:       [Recorded] traZODone 50 mg oral tablet [take 1 tablet (50 mg) by oral route 3 times per day]       [Recorded] sertraline 25 mg oral tablet [take 1 tablet (25 mg) by oral route once daily]       [Refilled] traZODone 50 mg oral tablet [take 1 tablet (50 mg) by oral route QHS], #30 (thirty) tablets, Refills: 2 (two)       [Refilled] sertraline 25 mg oral tablet [take 1 tablet (25 mg) by oral route once daily], #30 (thirty) tablets, Refills: 2 (two)       [Refilled] Diazepam 5 mg oral tablet [1/2 to 1 PO BID PRN], #45 (forty five) tablets, Refills: 0 (zero)         Pain in unspecified joint    LABORATORY:  Labs ordered to be performed today  include Arthritis Profile and Lyme Ab/Western Blot Reflex.            Orders:       [id number]  LYSCR - [facility] Lyme Ab/Western Blot Reflex  (Send-Out)            [id number]  RAPII - [facility] Arthritis Profile  (Send-Out)              Unspecified otitis externa, left earplace distilled white vinegar in L ear daily for 5 days for fungal infection            Patient Recommendations:        For  Pain in unspecified joint:    I also recommend ^.              Charge Capture:         Primary Diagnosis:     Z13.31  Encounter for screening for depression           Orders:      [id number]  Office/outpatient visit; established patient, level 4  (In-House)              Depression screen negative  (In-House)              M21.371  Foot drop, right foot     I50.20  Unspecified systolic (congestive) heart failure     F41.1  Generalized anxiety disorder     Z79.899  Other long term (current) drug therapy           Orders:      [id number]  Drug test prsmv read direct optical obs pr date  (In-House)              F51.01  Primary insomnia     M25.50  Pain in unspecified joint     H60.92  Unspecified otitis externa, left ear         ADDENDUMS:      ____________________________________    Addendum: 03/02/2020 02:33 PM - One, Team         Visit Note Faxed to:        User Entered Recipient; Number [phone]

## 2021-05-18 NOTE — PROGRESS NOTES
Juanita Morelos. 1955     Office/Outpatient Visit    Visit Date: Fri, Apr 19, 2019 03:15 pm    Provider: Belkis Berkowitz MD (Assistant: Santa Winston MA)    Location: [facility]        Electronically signed by Belkis Berkowitz MD on  04/22/2019 10:59:37 AM                             SUBJECTIVE:        CC:     Juanita Lee is a 64 year old White female.  She is here today following a transition of care from urgent care center: [facility] on 4/13/19 for cough.          HPI:     Still having bronchitis symptoms- chest congestion, dyspnea, productive cough (green), Also sneezing, itching eyes, headache which she figures is allergies. Feeling better than when her symptoms began 3 weeks ago but still bothersome. Was given azithromycin  on 4/11 and saw some improvement. Went to urgent care on 4/13 and was given  steroid shot and oral steroids, which also helped some. Has also been doing the albuterol every 4-6 hours. Urgent care got an x-ray which showed no acute process but was concerning for thoracic aneurysm so CT was ordered, which showed no evidence of aneurysm.  She rates her illness at 10/10 prior to abx and now she is a 6/10 with her symptom severity.          Abdominal pain and diarrhea have improved.        ROS:     CONSTITUTIONAL:  Positive for fatigue.   Negative for chills or fever.      EYES:  Positive for itching.      E/N/T:  Positive for ear pain, nasal congestion and frequent rhinorrhea.   Negative for sore throat.      CARDIOVASCULAR:  Negative for chest pain, palpitations and pedal edema.      RESPIRATORY:  Positive for recent cough ( with scant amounts of purulent sputum ), dyspnea and chest congestion.      GASTROINTESTINAL:  Positive for abdominal pain and diarrhea.   Negative for constipation, nausea or vomiting.      GENITOURINARY:  Negative for dysuria.      INTEGUMENTARY/BREAST:  Negative for rash.      NEUROLOGICAL:  Positive for dizziness and headaches.   Negative for weakness.       PSYCHIATRIC:  Negative for anxiety, depression and sleep disturbance.          PMH/FMH/SH:     Last Reviewed on 2019 03:54 PM by Belkis Berkowitz    Past Medical History:                 PAST MEDICAL HISTORY         Gastroesophageal Reflux Disease     Osteoarthritis    Osteoporosis     hypoglycemia     vit D def         GYNECOLOGICAL HISTORY:             CURRENT MEDICAL PROVIDERS:    Ophthalmologist: ALVA/AAYUSH    Orthopedist: DONNA/NIVIA    VASCULAR-DR EUGENIA HANKINS         PREVENTIVE HEALTH MAINTENANCE             BONE DENSITY: was last done 3-23-16 with the following abnormality noted-- osteoporosis     COLORECTAL CANCER SCREENING: Up to date (colonoscopy q10y; sigmoidoscopy q5y; Cologuard q3y) was last done 9/3/2010, Results are in chart; colonoscopy with the following abnormalities noted-- diverticulosis     MAMMOGRAM: Done within last 2 years and results in are chart was last done 18 with normal results         PAST MEDICAL HISTORY                 ADVANCED DIRECTIVES: None         Surgical History:         Appendectomy: ;     Cholecystectomy: ;     Hysterectomy: ;     OOPHRECTOMY      Cataract Removal: details/8 surgeriescomplications?;      section: X 2;     Joint Replacement: knee; ;         Family History:         Positive for Hyperlipidemia ( mother ) and Myocardial Infarction ( father ).      Positive for Breast Cancer ( mat. aunt ), Colon Cancer ( mat. uncle ) and Lung Cancer ( mat. uncle; mat. aunt ).      Positive for pulmonary fibrosis=- mat uncle.      Positive for Alzheimer's Disease ( mat. uncle ) and Cerebrovascular Accident ( mat. uncle ).          Social History:     Occupation:    Disabled     Marital Status:      Children: 3 children         Tobacco/Alcohol/Supplements:     Last Reviewed on 2019 03:54 PM by Belkis Berkowitz    Tobacco: She has a past history of cigarette smoking; quit date:  .  Non-drinker         Substance  Abuse History:     Last Reviewed on 3/12/2019 11:38 AM by Comfort Petersen        Mental Health History:     Last Reviewed on 3/12/2019 11:38 AM by Comfort Petersen        Communicable Diseases (eg STDs):     Last Reviewed on 3/12/2019 11:38 AM by Comfort Petersen            Allergies:     Last Reviewed on 4/11/2019 09:55 AM by Francy Forrester    Actonel: Myalgia (Adverse Reaction)    Morphine Sulfate: nausea, tachycardia        Current Medications:     Last Reviewed on 4/11/2019 09:55 AM by Francy Forrester    Diazepam 5mg Tablet 1/2 to 1 PO BID PRN     Synthroid 0.075mg Tablet Take 1 tablet(s) by mouth daily     Fluticasone Propionate 50mcg/1actuation Nasal Spray 1 spray each nostil daily     Omeprazole 40mg Capsules, Extended Release 1 capsule daily     Sodium Chloride 0.65% Nasal Solution 1 squirt each nostril BID     Vitamin D3 5,000IU Capsules 2 tabs daily     Multivitamin/Mineral Supplement Tablet Take 1 tablet(s) by mouth daily     Promethazine with Dextromethrophan 6.25mg/15mg per 5ml Syrup 1 tsp p.o. q 4-6 hrs. prn cough/nausea     Ibuprofen 200mg Tablet prn     PROBIOTIC QD         OBJECTIVE:        Vitals:         Current: 4/19/2019 3:26:42 PM    Ht:  5 ft, 2.75 in;  Wt: 95.8 lbs;  BMI: 17.1    T: 98.1 F (oral);  BP: 133/60 mm Hg (left arm, sitting);  P: 83 bpm (left arm (BP Cuff), sitting);  sCr: 0.59 mg/dL;  GFR: 78.09    O2 Sat: 97 % (room air)        Exams:     PHYSICAL EXAM:     GENERAL: well developed, well nourished;  no apparent distress;     EYES: PERRL, EOMI     E/N/T: EARS: external auditory canal normal on the left;  bilateral TMs are normal;  small amount of cerumen right EC; NOSE: normal turbinates; bilateral maxillary sinus tenderness present; OROPHARYNX: oral mucosa is normal; posterior pharynx shows no exudate;     NECK: range of motion is normal; trachea is midline;     RESPIRATORY: normal respiratory rate and pattern with no distress; coarse breath sounds in the apices;  rhonchi heard in the  apices;     CARDIOVASCULAR: normal rate; rhythm is regular;     GASTROINTESTINAL: nontender; normal bowel sounds; no organomegaly;     MUSCULOSKELETAL: normal gait;     NEUROLOGIC: mental status: alert and oriented x 3; GROSSLY INTACT     PSYCHIATRIC: appropriate affect and demeanor;         ASSESSMENT           466.0   J20.9  Acute bronchitis              DDx:     787.91   R19.7  Diarrhea              DDx:     793.2   R91.8  Radiologic abnormality of chest, NEC              DDx:         ORDERS:         Meds Prescribed:       Breo Ellipta (Fluticasone Furoate/Vilanterol) 100mcg/25mcg Inhalation Powder Take 1 inhalation(s) by mouth daily  #1 (One) inhaler(s) Refills: 0       Augmentin (Amoxicillin/Clavulanate) 875mg/125mg Tablet 1 tab bid  #20 (Twenty) tablet(s) Refills: 0         Lab Orders:       [id number]  [facility] Sputum Culture  (Send-Out)                   PLAN:          Acute bronchitis ongoing for 3 weeks, with minimal improvement on zpac, will start her on breo ((call in symbicort or dulera or advair at lowest dose if this is unavailable) all bid dosing)) and augmentin 875 bid     LABORATORY:  Labs ordered to be performed today include sputum culture.      RECOMMENDATIONS given include: Push Fluids, Rest, Follow up if no improvement or worsening symptoms like high fevers, vomiting, weakness, or increasing shortness of air.    .            Prescriptions:       Breo Ellipta (Fluticasone Furoate/Vilanterol) 100mcg/25mcg Inhalation Powder Take 1 inhalation(s) by mouth daily  #1 (One) inhaler(s) Refills: 0       Augmentin (Amoxicillin/Clavulanate) 875mg/125mg Tablet 1 tab bid  #20 (Twenty) tablet(s) Refills: 0           Orders:       [id number]  [facility] Sputum Culture  (Send-Out)            Diarrhea better, no abd pain          Radiologic abnormality of chest, NEC CXR was suspicous for thoracic aneurysm, pt sent for CT chest-neg for aneurysm, neg for pneumonia             CHARGE CAPTURE           **Please note: ICD  descriptions below are intended for billing purposes only and may not represent clinical diagnoses**        Primary Diagnosis:         466.0 Acute bronchitis            J20.9    Acute bronchitis, unspecified              Orders:          [id number]   Office/outpatient visit; established patient, level 4  (In-House)           787.91 Diarrhea            R19.7    Diarrhea, unspecified    793.2 Radiologic abnormality of chest, NEC            R91.8    Other nonspecific abnormal finding of lung field

## 2021-06-16 NOTE — TELEPHONE ENCOUNTER
Caller: Leana Morelos    Relationship: Emergency Contact    Best call back number: [phone]    Medication needed:   BUSPIRONE HCL TABLET 15 MG     When do you need the refill by: 06/16/21     What additional details did the patient provide when requesting the medication: PATIENT IS NEEDING A REFILL. PLEASE CALL AND ADVISE     Does the patient have less than a 3 day supply:  [x] Yes  [] No    What is the patient's preferred pharmacy: [facility] - [place], [place] - [address]  - [phone] [facility] [phone] FX

## 2021-07-08 NOTE — TELEPHONE ENCOUNTER
Caller: Leana Morelso    Relationship: Emergency Contact    Best call back number:[phone]  Medication needed:   Requested Prescriptions      No prescriptions requested or ordered in this encounter     ELIQUIS 5MG     Does the patient have less than a 3 day supply:  [x] Yes  [] No    What is the patient's preferred pharmacy: [facility] [address] [place] [address] - [address]  - [phone]  - [phone] FX              Hospitalist

## 2021-07-09 NOTE — TELEPHONE ENCOUNTER
Per insurance, this needs to go through Hospice, Michelle Alexis inf [facility], I inf [facility] with Hosp[facility], she said Hospice doesn't pay for this med, she will contact the pharmacy and get it straightened out

## 2021-07-09 NOTE — TELEPHONE ENCOUNTER
Caller: SISTER    Relationship: Brother/Sister    Best call back number: [phone]    What medications are you currently taking:   Current Outpatient Medications on File Prior to Visit   Medication Sig Dispense Refill   • albuterol (PROVENTIL) (2.5 MG/3ML) 0.083% nebulizer solution Take 2.5 mg by nebulization Every 6 (Six) Hours As Needed for Wheezing.     • apixaban (ELIQUIS) 5 MG tablet tablet Take 1 tablet by mouth Every 12 (Twelve) Hours. 60 tablet 0   • busPIRone (BUSPAR) 15 MG tablet Take 1 tablet by mouth 3 (Three) Times a Day. 90 tablet 2   • D-3-5 125 MCG (5000 UT) capsule Take 10,000 Units by mouth Daily.  3   • docusate sodium (COLACE) 100 MG capsule Take 100 mg by mouth Daily As Needed for Constipation.     • dorzolamide-timolol (COSOPT) 22.3-6.8 MG/ML ophthalmic solution      • fentaNYL (DURAGESIC) 25 MCG/HR patch Apply patch Q 72 Hrs, remove and discard old patch. 5 patch 0   • ferrous sulfate 324 (65 Fe) MG tablet delayed-release EC tablet Take 324 mg by mouth 2 (Two) Times a Day With Meals.     • fluticasone (FLONASE) 50 MCG/ACT nasal spray 1 spray by Each Nare route Daily As Needed.  0   • haloperidol (HALDOL) 2 MG/ML solution Take 0.5 mL by mouth Every 4 (Four) Hours As Needed (nausea, agitation, hallucinations). 30 mL 0   • ipratropium (ATROVENT) 0.02 % nebulizer solution Take 500 mcg by nebulization 4 (Four) Times a Day.     • latanoprost (XALATAN) 0.005 % ophthalmic solution Administer 1 drop to both eyes Daily.     • levothyroxine (SYNTHROID, LEVOTHROID) 75 MCG tablet Take 75 mcg by mouth Daily.     • LORazepam (LORazepam Intensol) 2 MG/ML concentrated solution Take 0.5 mL by mouth Every 4 (Four) Hours As Needed for Anxiety. 30 mL 0   • magnesium oxide (MAGOX) 400 (241.3 Mg) MG tablet tablet Take 1 tablet by mouth 2 (two) times a day. 30 each 2   • melatonin 5 MG tablet tablet Take 5 mg by mouth At Night As Needed.     • meloxicam (Mobic) 7.5 MG tablet Take 1 tablet by mouth Daily. 90  tablet 1   • Misc. Devices (ROLLATOR ULTRA-LIGHT) misc Use rollator to assist with basic day to day ambulation around her home and out in public. 1 each 0   • Misc. Devices (ROLLATOR ULTRA-LIGHT) misc Use rollator to assist with basic day to day ambulation around her home and out in public. Used to help steady the balance of the patient. 1 each 0   • morphine 20 MG/ML concentrated solution Take 0.25 ml (5mg) po Q 1 Hr prn pain or respiratory distress 30 mL 0   • nystatin (MYCOSTATIN) 466130 UNIT/ML suspension SWISH AND SWALLOW 5 TO 10ML BY MOUTH FOUR TIMES A DAY FOR 10 DAYS 60 mL 0   • omeprazole (priLOSEC) 20 MG capsule      • omeprazole (priLOSEC) 40 MG capsule Take 40 mg by mouth Daily.  0   • polyethylene glycol (MIRALAX) packet Take 17 g by mouth Daily.     • SSD 1 % cream APPLY TWO TIMES A DAY TO PRESSURE ULCER 100 each 0   • [DISCONTINUED] apixaban (ELIQUIS) 5 MG tablet tablet Take 5 mg by mouth 2 (Two) Times a Day.       No current facility-administered medications on file prior to visit.            Which medication are you concerned about: JOLYNN    Who prescribed you this medication: ARTUR NOE    What are your concerns: GITA NEEDS A PRIOR AUTHORIZATION.    PATIENT DOWN TO ONE PILL

## 2022-01-28 NOTE — DISCHARGE SUMMARY
Follow-up with renal ultrasound and phone check in 2-4 weeks-no formal appt Orthopedic Discharge Summary      Patient: Richelle Morelos      YOB: 1955    Medical Record Number: [id number]    Attending Physician: Naveen Michaud MD  Consulting Physician(s): Kecia Caballero MD    Date of Admission: 7/10/2018  5:11 AM  Date of Discharge:  7/12/18      Patient Active Problem List   Diagnosis   • Rotator cuff tear arthropathy of right shoulder   • Status post total knee replacement, left   • PONV (postoperative nausea and vomiting)   • Hypothyroidism   • Anxiety   • Nausea & vomiting   • Moderate malnutrition (CMS/HCC)     TOTAL SHOULDER REVERSE ARTHROPLASTY       Allergies   Allergen Reactions   • Morphine And Related Nausea Only and Palpitations       Current Medications:     Discharge Medications      New Medications      Instructions Start Date   HYDROcodone-acetaminophen 7.5-325 MG per tablet  Commonly known as:  NORCO  Replaces:  HYDROcodone-acetaminophen 5-325 MG per tablet   Take 1-2 tabs po q 4 hours prn pain      ondansetron 4 MG tablet  Commonly known as:  ZOFRAN   4 mg, Oral, Every 6 Hours PRN         Continue These Medications      Instructions Start Date   diazePAM 5 MG tablet  Commonly known as:  VALIUM   2.5-5 mg, Oral, 2 Times Daily PRN      levothyroxine 75 MCG tablet  Commonly known as:  SYNTHROID, LEVOTHROID   75 mcg, Oral, Daily         Stop These Medications    HYDROcodone-acetaminophen 5-325 MG per tablet  Commonly known as:  NORCO  Replaced by:  HYDROcodone-acetaminophen 7.5-325 MG per tablet     mupirocin 2 % ointment  Commonly known as:  BACTROBAN                 Past Medical History:   Diagnosis Date   • Anxiety    • Arthritis     OSTEO   • History of headache    • Hypothyroidism    • PONV (postoperative nausea and vomiting)    • Right shoulder pain    • Slow to wake up after anesthesia    • Vision loss     RIGHT EYE        Past Surgical History:   Procedure Laterality Date   • EYE SURGERY Right     SEVERAL TIME FOR HEMORRHAGE   • HYSTERECTOMY      ABD WITH  CAILIN S AND O   • KNEE ARTHROPLASTY Left    • LAPAROSCOPIC CHOLECYSTECTOMY     • REFRACTIVE SURGERY Bilateral    • TEETH EXTRACTION     • TOTAL SHOULDER ARTHROPLASTY W/ DISTAL CLAVICLE EXCISION Right 7/10/2018    Procedure: TOTAL SHOULDER REVERSE ARTHROPLASTY;  Surgeon: Navene Michaud MD;  Location: [facility] OR;  Service: Orthopedics        Social History     Occupational History   • Not on file.     Social History Main Topics   • Smoking status: Former Smoker     Packs/day: 0.50     Years: 0.50     Types: Cigarettes   • Smokeless tobacco: Never Used      Comment: 1/2 PPD QUIT 47 YR AGO.    • Alcohol use No   • Drug use: No   • Sexual activity: Not on file      Social History     Social History Narrative   • No narrative on file        Family History   Problem Relation Age of Onset   • Malig Hyperthermia Neg Hx              Hospital Course:  63 y.o. female admitted to [facility] to services of Naveen Michaud MD with Rotator cuff tear arthropathy of right shoulder [M12.811]  Rotator cuff tear arthropathy of right shoulder [M12.811] on 7/10/2018 and underwent TOTAL SHOULDER REVERSE ARTHROPLASTY  Per Naveen Michaud MD. Antibiotic and VTE prophylaxis were per SCIP protocols. Post-operatively the patient transferred to the post-operative floor where the patient underwent mobilization therapy that included active as well as passive ROM exercises. Opioids were titrated to achieve appropriate pain management to allow for participation in mobilization exercises. Vital signs are now stable. The incision is intact without signs or symptoms of infection. Operative extremity neurovascular status remains intact.     Appropriate education re: incision care, activity levels, medications, and follow up visits was completed and all questions were answered. The patient is now deemed stable for discharge from hospital.      DIAGNOSTIC TESTS:   Admission on 07/10/2018   Component Date Value Ref Range Status   • Hemoglobin 07/10/2018  11.4* 11.9 - 15.5 g/dL Final   • Hematocrit 07/10/2018 34.5* 35.6 - 45.5 % Final   • Hemoglobin 07/11/2018 10.9* 11.9 - 15.5 g/dL Final   • Hematocrit 07/11/2018 34.4* 35.6 - 45.5 % Final   • Hemoglobin 07/12/2018 10.3* 11.9 - 15.5 g/dL Final   • Hematocrit 07/12/2018 32.7* 35.6 - 45.5 % Final   • Glucose 07/12/2018 104* 65 - 99 mg/dL Final   • BUN 07/12/2018 8  8 - 23 mg/dL Final   • Creatinine 07/12/2018 0.43* 0.57 - 1.00 mg/dL Final   • Sodium 07/12/2018 140  136 - 145 mmol/L Final   • Potassium 07/12/2018 3.9  3.5 - 5.2 mmol/L Final   • Chloride 07/12/2018 100  98 - 107 mmol/L Final   • CO2 07/12/2018 30.0* 22.0 - 29.0 mmol/L Final   • Calcium 07/12/2018 8.4* 8.6 - 10.5 mg/dL Final   • eGFR Non African Amer 07/12/2018 148  >60 mL/min/1.73 Final   • BUN/Creatinine Ratio 07/12/2018 18.6  7.0 - 25.0 Final   • Anion Gap 07/12/2018 10.0  mmol/L Final     No results found.    Discharge and Follow up Instructions:   1)  Patient is to continue with physical therapy exercises daily and continue working with the physical therapist as ordered.  2)  Continue to follow precautions as instructed.   3)  Patient is instructed to continue use of the sling except when performing their physical therapy exercising and while dressing or showering.  4)  Continue ice regularly.  Patient also instructed on incentive spirometer during hospitalization and encouraged to continue to use at home regularly.   5)  The dressing should be left in place. If waterproof dressing is intact the patient may shower immediately following discharge. If the dressing becomes disloged or saturated it should be changed and patient must wait until POD #5 to shower. If dressing is changed, apply dry sterile dressing after showering.  6)  Follow up appointment in 2 weeks - patient to call the office at [phone] to schedule.             Date: 7/12/2018    Oksana Spangler RN      Time: Discharge 15 min     Naveen Michaud MD      DICTATED UTILIZING [facility]  DICTATION

## (undated) DEVICE — DRSNG WND GEL FIBR OPTICELL AG PLS W/SLV LF 6X6IN  STRL

## (undated) DEVICE — APPL CHLORAPREP HI/LITE 26ML ORNG

## (undated) DEVICE — PIN DRL NOHEAD TROC 3.2X75MM BX/4

## (undated) DEVICE — PROXIMATE RH ROTATING HEAD SKIN STAPLERS (35 WIDE) CONTAINS 35 STAINLESS STEEL STAPLES: Brand: PROXIMATE

## (undated) DEVICE — GLV SURG BIOGEL LTX PF 8

## (undated) DEVICE — EMERALD ARTHROSCOPY SHEET: Brand: CONVERTORS

## (undated) DEVICE — GLV SURG PREMIERPRO ORTHO LTX PF SZ8 BRN

## (undated) DEVICE — STPLR SKIN VISISTAT WD 35CT

## (undated) DEVICE — SKIN PREP TRAY W/CHG: Brand: MEDLINE INDUSTRIES, INC.

## (undated) DEVICE — POOLE SUCTION HANDLE: Brand: CARDINAL HEALTH

## (undated) DEVICE — PK SHLDR OPN 40

## (undated) DEVICE — INTEGUSEAL MICROBIAL SEALANT: Brand: AVANOS

## (undated) DEVICE — MAT FLR ABSORBENT LG 4FT 10 2.5FT

## (undated) DEVICE — SUT VIC 2/0 CT1 36IN

## (undated) DEVICE — PK ORTHO MAJ 40

## (undated) DEVICE — T-DRAPE,EXTREMITY,STERILE: Brand: MEDLINE

## (undated) DEVICE — BIT DRL SCRW PERIPH 2.7MM

## (undated) DEVICE — HEWSON SUTURE RETRIEVER: Brand: HEWSON SUTURE RETRIEVER

## (undated) DEVICE — BNDG ELAS CO-FLEX SLF ADHR 6IN 5YD LF STRL

## (undated) DEVICE — SOL ISO/ALC RUB 70PCT 4OZ

## (undated) DEVICE — APPL CHLORAPREP W/TINT 26ML ORNG

## (undated) DEVICE — IMPLANTABLE DEVICE
Type: IMPLANTABLE DEVICE | Site: HIP | Status: NON-FUNCTIONAL
Removed: 2020-06-19

## (undated) DEVICE — GW THRD DRL/TP 2.5X200MM

## (undated) DEVICE — NDL SPINE 22G 31/2IN BLK

## (undated) DEVICE — ANTIBACTERIAL UNDYED BRAIDED (POLYGLACTIN 910), SYNTHETIC ABSORBABLE SUTURE: Brand: COATED VICRYL

## (undated) DEVICE — TRAP FLD MINIVAC MEGADYNE 100ML

## (undated) DEVICE — DRAPE,REIN 53X77,STERILE: Brand: MEDLINE

## (undated) DEVICE — ENCORE® LATEX ORTHO SIZE 8, STERILE LATEX POWDER-FREE SURGICAL GLOVE: Brand: ENCORE

## (undated) DEVICE — IMMOB KN 3PNL DLX CANVS 22IN BLU

## (undated) DEVICE — DRAPE,U/ SHT,SPLIT,PLAS,STERIL: Brand: MEDLINE

## (undated) DEVICE — SYR CONTRL LUERLOK 10CC

## (undated) DEVICE — BIT DRL QC DIA 4.3X180MM

## (undated) DEVICE — 4.0MM EGG BUR

## (undated) DEVICE — CONTAINER,SPECIMEN,OR STERILE,4OZ: Brand: MEDLINE

## (undated) DEVICE — NEEDLE, QUINCKE 22GX3.5": Brand: MEDLINE INDUSTRIES, INC.

## (undated) DEVICE — SUT VIC 1 CT1 36IN J947H

## (undated) DEVICE — SUT MNCRYL 3/0 PS2 18IN MCP497G

## (undated) DEVICE — PAD,ABDOMINAL,8"X10",ST,LF: Brand: MEDLINE

## (undated) DEVICE — BIT DRL QC DIA 3.2X145MM

## (undated) DEVICE — GLV SURG TRIUMPH CLASSIC PF LTX 8.5 STRL

## (undated) DEVICE — 2108 SERIES SAGITTAL BLADE (19.5 X 1.27 X 81.0MM)

## (undated) DEVICE — Device

## (undated) DEVICE — IMMOB SHLDR PAD2 UNIV SM

## (undated) DEVICE — 3M™ IOBAN™ 2 ANTIMICROBIAL INCISE DRAPE 6640EZ: Brand: IOBAN™ 2

## (undated) DEVICE — BIT DRL SCRW CENTRL 3.2MM

## (undated) DEVICE — ELECTRD BLD EXT EDGE 1P COAT 6.5IN STRL

## (undated) DEVICE — NDL HYPO PRECISIONGLIDE REG 25G 1 1/2

## (undated) DEVICE — ADHS SKIN DERMABOND TOP ADVANCED

## (undated) DEVICE — DRSNG BRDR MEPILEX P/OP SIL 4X8IN

## (undated) DEVICE — PENCL E/S ULTRAVAC TELESCP NOSE HOLSTR 10FT

## (undated) DEVICE — GLV SURG SIGNATURE ESSENTIAL PF LTX SZ8